# Patient Record
Sex: MALE | Race: WHITE | Employment: OTHER | URBAN - NONMETROPOLITAN AREA
[De-identification: names, ages, dates, MRNs, and addresses within clinical notes are randomized per-mention and may not be internally consistent; named-entity substitution may affect disease eponyms.]

---

## 2018-09-24 ENCOUNTER — HOSPITAL ENCOUNTER (INPATIENT)
Age: 73
LOS: 7 days | Discharge: HOME | DRG: 440 | End: 2018-10-02
Attending: EMERGENCY MEDICINE | Admitting: NURSE PRACTITIONER
Payer: MEDICARE

## 2018-09-24 ENCOUNTER — APPOINTMENT (OUTPATIENT)
Dept: CT IMAGING | Age: 73
DRG: 440 | End: 2018-09-24
Payer: MEDICARE

## 2018-09-24 DIAGNOSIS — R11.2 NAUSEA AND VOMITING IN ADULT: Primary | ICD-10-CM

## 2018-09-24 DIAGNOSIS — M19.90 ARTHRITIS: ICD-10-CM

## 2018-09-24 DIAGNOSIS — G89.29 NECK PAIN, CHRONIC: ICD-10-CM

## 2018-09-24 DIAGNOSIS — E11.8 TYPE 2 DIABETES MELLITUS WITH COMPLICATION, WITHOUT LONG-TERM CURRENT USE OF INSULIN (HCC): ICD-10-CM

## 2018-09-24 DIAGNOSIS — M54.2 NECK PAIN, CHRONIC: ICD-10-CM

## 2018-09-24 DIAGNOSIS — K85.00 IDIOPATHIC ACUTE PANCREATITIS WITHOUT INFECTION OR NECROSIS: ICD-10-CM

## 2018-09-24 LAB
ALBUMIN SERPL-MCNC: 4.5 GM/DL (ref 3.4–5)
ALP BLD-CCNC: 57 IU/L (ref 40–129)
ALT SERPL-CCNC: 25 U/L (ref 10–40)
ANION GAP SERPL CALCULATED.3IONS-SCNC: 13 MMOL/L (ref 4–16)
AST SERPL-CCNC: 26 IU/L (ref 15–37)
BACTERIA: NEGATIVE /HPF
BASOPHILS ABSOLUTE: 0 K/CU MM
BASOPHILS RELATIVE PERCENT: 0.1 % (ref 0–1)
BILIRUB SERPL-MCNC: 0.3 MG/DL (ref 0–1)
BILIRUBIN URINE: NEGATIVE MG/DL
BLOOD, URINE: NEGATIVE
BUN BLDV-MCNC: 14 MG/DL (ref 6–23)
CALCIUM SERPL-MCNC: 8.5 MG/DL (ref 8.3–10.6)
CAST TYPE: ABNORMAL /HPF
CHLORIDE BLD-SCNC: 94 MMOL/L (ref 99–110)
CLARITY: CLEAR
CO2: 31 MMOL/L (ref 21–32)
COLOR: YELLOW
CREAT SERPL-MCNC: 0.8 MG/DL (ref 0.9–1.3)
CRYSTAL TYPE: ABNORMAL /HPF
DIFFERENTIAL TYPE: ABNORMAL
EOSINOPHILS ABSOLUTE: 0.1 K/CU MM
EOSINOPHILS RELATIVE PERCENT: 0.5 % (ref 0–3)
EPITHELIAL CELLS, UA: ABNORMAL /HPF
GFR AFRICAN AMERICAN: >60 ML/MIN/1.73M2
GFR NON-AFRICAN AMERICAN: >60 ML/MIN/1.73M2
GLUCOSE BLD-MCNC: 225 MG/DL (ref 70–99)
GLUCOSE, URINE: 250 MG/DL
HCT VFR BLD CALC: 44.4 % (ref 42–52)
HEMOGLOBIN: 14.7 GM/DL (ref 13.5–18)
IMMATURE NEUTROPHIL %: 0.5 % (ref 0–0.43)
KETONES, URINE: NEGATIVE MG/DL
LACTATE: 1.7 MMOL/L (ref 0.4–2)
LEUKOCYTE ESTERASE, URINE: NEGATIVE
LIPASE: >600 IU/L (ref 13–60)
LYMPHOCYTES ABSOLUTE: 1 K/CU MM
LYMPHOCYTES RELATIVE PERCENT: 9.8 % (ref 24–44)
MCH RBC QN AUTO: 29.7 PG (ref 27–31)
MCHC RBC AUTO-ENTMCNC: 33.1 % (ref 32–36)
MCV RBC AUTO: 89.7 FL (ref 78–100)
MONOCYTES ABSOLUTE: 0.4 K/CU MM
MONOCYTES RELATIVE PERCENT: 4.2 % (ref 0–4)
MUCUS: NEGATIVE HPF
NITRITE URINE, QUANTITATIVE: NEGATIVE
PDW BLD-RTO: 12.1 % (ref 11.7–14.9)
PH, URINE: 6.5 (ref 5–8)
PLATELET # BLD: 251 K/CU MM (ref 140–440)
PMV BLD AUTO: 9.1 FL (ref 7.5–11.1)
POTASSIUM SERPL-SCNC: 4.1 MMOL/L (ref 3.5–5.1)
PROTEIN UA: NEGATIVE MG/DL
RBC # BLD: 4.95 M/CU MM (ref 4.6–6.2)
RBC URINE: ABNORMAL /HPF (ref 0–3)
SEGMENTED NEUTROPHILS ABSOLUTE COUNT: 8.7 K/CU MM
SEGMENTED NEUTROPHILS RELATIVE PERCENT: 84.9 % (ref 36–66)
SODIUM BLD-SCNC: 138 MMOL/L (ref 135–145)
SPECIFIC GRAVITY UA: <1.005 (ref 1–1.03)
TOTAL IMMATURE NEUTOROPHIL: 0.05 K/CU MM
TOTAL PROTEIN: 7.4 GM/DL (ref 6.4–8.2)
UROBILINOGEN, URINE: 0.2 MG/DL (ref 0.2–1)
VOLUME, (UVOL): 12 ML (ref 10–12)
WBC # BLD: 10.3 K/CU MM (ref 4–10.5)
WBC UA: ABNORMAL /HPF (ref 0–2)

## 2018-09-24 PROCEDURE — 81001 URINALYSIS AUTO W/SCOPE: CPT

## 2018-09-24 PROCEDURE — 74177 CT ABD & PELVIS W/CONTRAST: CPT

## 2018-09-24 PROCEDURE — 83690 ASSAY OF LIPASE: CPT

## 2018-09-24 PROCEDURE — 80053 COMPREHEN METABOLIC PANEL: CPT

## 2018-09-24 PROCEDURE — 6360000004 HC RX CONTRAST MEDICATION: Performed by: EMERGENCY MEDICINE

## 2018-09-24 PROCEDURE — 6360000002 HC RX W HCPCS: Performed by: PHYSICIAN ASSISTANT

## 2018-09-24 PROCEDURE — 99285 EMERGENCY DEPT VISIT HI MDM: CPT

## 2018-09-24 PROCEDURE — 36415 COLL VENOUS BLD VENIPUNCTURE: CPT

## 2018-09-24 PROCEDURE — 83605 ASSAY OF LACTIC ACID: CPT

## 2018-09-24 PROCEDURE — 85025 COMPLETE CBC W/AUTO DIFF WBC: CPT

## 2018-09-24 PROCEDURE — 96374 THER/PROPH/DIAG INJ IV PUSH: CPT

## 2018-09-24 RX ORDER — ONDANSETRON 2 MG/ML
4 INJECTION INTRAMUSCULAR; INTRAVENOUS EVERY 30 MIN PRN
Status: DISCONTINUED | OUTPATIENT
Start: 2018-09-24 | End: 2018-09-25

## 2018-09-24 RX ORDER — MORPHINE SULFATE 4 MG/ML
4 INJECTION, SOLUTION INTRAMUSCULAR; INTRAVENOUS EVERY 30 MIN PRN
Status: DISCONTINUED | OUTPATIENT
Start: 2018-09-24 | End: 2018-09-25

## 2018-09-24 RX ADMIN — MORPHINE SULFATE 4 MG: 4 INJECTION, SOLUTION INTRAMUSCULAR; INTRAVENOUS at 22:06

## 2018-09-24 RX ADMIN — ONDANSETRON 4 MG: 2 INJECTION, SOLUTION INTRAMUSCULAR; INTRAVENOUS at 22:06

## 2018-09-24 RX ADMIN — IOPAMIDOL 75 ML: 755 INJECTION, SOLUTION INTRAVENOUS at 23:11

## 2018-09-24 ASSESSMENT — PAIN SCALES - GENERAL
PAINLEVEL_OUTOF10: 5
PAINLEVEL_OUTOF10: 4
PAINLEVEL_OUTOF10: 6

## 2018-09-24 ASSESSMENT — ENCOUNTER SYMPTOMS
HEMATEMESIS: 0
DIARRHEA: 0
CONSTIPATION: 0
RESPIRATORY NEGATIVE: 1
VOMITING: 1
ABDOMINAL PAIN: 1
SHORTNESS OF BREATH: 0
HEMATOCHEZIA: 0
COUGH: 0
FLATUS: 0
NAUSEA: 1
EYES NEGATIVE: 1

## 2018-09-24 ASSESSMENT — PAIN DESCRIPTION - DESCRIPTORS
DESCRIPTORS: SHARP
DESCRIPTORS: SHARP

## 2018-09-24 ASSESSMENT — PAIN DESCRIPTION - LOCATION
LOCATION: ABDOMEN
LOCATION: ABDOMEN

## 2018-09-24 ASSESSMENT — PAIN DESCRIPTION - PROGRESSION: CLINICAL_PROGRESSION: NOT CHANGED

## 2018-09-24 ASSESSMENT — PAIN DESCRIPTION - FREQUENCY
FREQUENCY: CONTINUOUS
FREQUENCY: CONTINUOUS

## 2018-09-24 ASSESSMENT — PAIN DESCRIPTION - PAIN TYPE
TYPE: ACUTE PAIN
TYPE: ACUTE PAIN

## 2018-09-24 ASSESSMENT — PAIN DESCRIPTION - ORIENTATION: ORIENTATION: MID

## 2018-09-25 ENCOUNTER — APPOINTMENT (OUTPATIENT)
Dept: ULTRASOUND IMAGING | Age: 73
DRG: 440 | End: 2018-09-25
Payer: MEDICARE

## 2018-09-25 PROBLEM — K85.90 ACUTE ON CHRONIC PANCREATITIS (HCC): Status: ACTIVE | Noted: 2018-09-25

## 2018-09-25 PROBLEM — K80.20 CHOLELITHIASIS: Status: ACTIVE | Noted: 2018-09-25

## 2018-09-25 PROBLEM — K86.1 ACUTE ON CHRONIC PANCREATITIS (HCC): Status: ACTIVE | Noted: 2018-09-25

## 2018-09-25 LAB
CHOLESTEROL: 123 MG/DL
ESTIMATED AVERAGE GLUCOSE: 177 MG/DL
GLUCOSE BLD-MCNC: 114 MG/DL (ref 70–99)
GLUCOSE BLD-MCNC: 122 MG/DL (ref 70–99)
GLUCOSE BLD-MCNC: 130 MG/DL (ref 70–99)
GLUCOSE BLD-MCNC: 131 MG/DL (ref 70–99)
GLUCOSE BLD-MCNC: 170 MG/DL (ref 70–99)
HBA1C MFR BLD: 7.8 % (ref 4.2–6.3)
HDLC SERPL-MCNC: 39 MG/DL
HIGH SENSITIVE C-REACTIVE PROTEIN: 8.7 MG/L
LACTATE DEHYDROGENASE: 189 IU/L (ref 120–246)
LDL CHOLESTEROL DIRECT: 84 MG/DL
LIPASE: >600 IU/L (ref 13–60)
TOTAL CK: 103 IU/L (ref 38–174)
TRIGL SERPL-MCNC: 64 MG/DL

## 2018-09-25 PROCEDURE — 83036 HEMOGLOBIN GLYCOSYLATED A1C: CPT

## 2018-09-25 PROCEDURE — 80061 LIPID PANEL: CPT

## 2018-09-25 PROCEDURE — 83721 ASSAY OF BLOOD LIPOPROTEIN: CPT

## 2018-09-25 PROCEDURE — 82550 ASSAY OF CK (CPK): CPT

## 2018-09-25 PROCEDURE — 83615 LACTATE (LD) (LDH) ENZYME: CPT

## 2018-09-25 PROCEDURE — 82962 GLUCOSE BLOOD TEST: CPT

## 2018-09-25 PROCEDURE — 2580000003 HC RX 258: Performed by: FAMILY MEDICINE

## 2018-09-25 PROCEDURE — 83690 ASSAY OF LIPASE: CPT

## 2018-09-25 PROCEDURE — 6370000000 HC RX 637 (ALT 250 FOR IP): Performed by: NURSE PRACTITIONER

## 2018-09-25 PROCEDURE — 36415 COLL VENOUS BLD VENIPUNCTURE: CPT

## 2018-09-25 PROCEDURE — 86141 C-REACTIVE PROTEIN HS: CPT

## 2018-09-25 PROCEDURE — 2580000003 HC RX 258: Performed by: NURSE PRACTITIONER

## 2018-09-25 PROCEDURE — 6360000002 HC RX W HCPCS: Performed by: NURSE PRACTITIONER

## 2018-09-25 PROCEDURE — 1200000000 HC SEMI PRIVATE

## 2018-09-25 PROCEDURE — 76705 ECHO EXAM OF ABDOMEN: CPT

## 2018-09-25 RX ORDER — ASPIRIN 325 MG
325 TABLET ORAL DAILY
Status: DISCONTINUED | OUTPATIENT
Start: 2018-09-25 | End: 2018-10-02 | Stop reason: HOSPADM

## 2018-09-25 RX ORDER — LEVOTHYROXINE SODIUM 0.15 MG/1
150 TABLET ORAL DAILY
Status: DISCONTINUED | OUTPATIENT
Start: 2018-09-25 | End: 2018-10-02 | Stop reason: HOSPADM

## 2018-09-25 RX ORDER — ACETAMINOPHEN 325 MG/1
650 TABLET ORAL EVERY 6 HOURS PRN
Status: DISCONTINUED | OUTPATIENT
Start: 2018-09-25 | End: 2018-10-02 | Stop reason: SDUPTHER

## 2018-09-25 RX ORDER — SODIUM CHLORIDE 0.9 % (FLUSH) 0.9 %
10 SYRINGE (ML) INJECTION EVERY 12 HOURS SCHEDULED
Status: DISCONTINUED | OUTPATIENT
Start: 2018-09-25 | End: 2018-10-02 | Stop reason: SDUPTHER

## 2018-09-25 RX ORDER — GLIPIZIDE 10 MG/1
10 TABLET ORAL
Status: DISCONTINUED | OUTPATIENT
Start: 2018-09-25 | End: 2018-09-25

## 2018-09-25 RX ORDER — SIMVASTATIN 40 MG
40 TABLET ORAL NIGHTLY
Status: DISCONTINUED | OUTPATIENT
Start: 2018-09-25 | End: 2018-10-02 | Stop reason: HOSPADM

## 2018-09-25 RX ORDER — DEXTROSE MONOHYDRATE 25 G/50ML
12.5 INJECTION, SOLUTION INTRAVENOUS PRN
Status: DISCONTINUED | OUTPATIENT
Start: 2018-09-25 | End: 2018-10-02 | Stop reason: HOSPADM

## 2018-09-25 RX ORDER — LANOLIN ALCOHOL/MO/W.PET/CERES
3 CREAM (GRAM) TOPICAL NIGHTLY
Status: DISCONTINUED | OUTPATIENT
Start: 2018-09-25 | End: 2018-10-02 | Stop reason: HOSPADM

## 2018-09-25 RX ORDER — NICOTINE POLACRILEX 4 MG
15 LOZENGE BUCCAL PRN
Status: DISCONTINUED | OUTPATIENT
Start: 2018-09-25 | End: 2018-10-02 | Stop reason: HOSPADM

## 2018-09-25 RX ORDER — CALCIUM CARBONATE 200(500)MG
500 TABLET,CHEWABLE ORAL DAILY
Status: DISCONTINUED | OUTPATIENT
Start: 2018-09-25 | End: 2018-10-02 | Stop reason: HOSPADM

## 2018-09-25 RX ORDER — OMEGA-3-ACID ETHYL ESTERS 1 G/1
1 CAPSULE, LIQUID FILLED ORAL DAILY
Status: DISCONTINUED | OUTPATIENT
Start: 2018-09-25 | End: 2018-10-02 | Stop reason: HOSPADM

## 2018-09-25 RX ORDER — GEMFIBROZIL 600 MG/1
600 TABLET, FILM COATED ORAL
Status: DISCONTINUED | OUTPATIENT
Start: 2018-09-25 | End: 2018-10-02 | Stop reason: HOSPADM

## 2018-09-25 RX ORDER — TRAZODONE HYDROCHLORIDE 50 MG/1
100 TABLET ORAL NIGHTLY
Status: DISCONTINUED | OUTPATIENT
Start: 2018-09-25 | End: 2018-10-02 | Stop reason: HOSPADM

## 2018-09-25 RX ORDER — FAMOTIDINE 20 MG/1
20 TABLET, FILM COATED ORAL 2 TIMES DAILY
Status: DISCONTINUED | OUTPATIENT
Start: 2018-09-25 | End: 2018-10-02 | Stop reason: HOSPADM

## 2018-09-25 RX ORDER — SODIUM CHLORIDE 9 MG/ML
INJECTION, SOLUTION INTRAVENOUS CONTINUOUS
Status: DISCONTINUED | OUTPATIENT
Start: 2018-09-25 | End: 2018-09-29

## 2018-09-25 RX ORDER — DEXTROSE MONOHYDRATE 50 MG/ML
100 INJECTION, SOLUTION INTRAVENOUS PRN
Status: DISCONTINUED | OUTPATIENT
Start: 2018-09-25 | End: 2018-10-02 | Stop reason: HOSPADM

## 2018-09-25 RX ORDER — ACETAMINOPHEN 325 MG/1
650 TABLET ORAL EVERY 4 HOURS PRN
Status: DISCONTINUED | OUTPATIENT
Start: 2018-09-25 | End: 2018-10-02

## 2018-09-25 RX ORDER — SODIUM CHLORIDE 0.9 % (FLUSH) 0.9 %
10 SYRINGE (ML) INJECTION PRN
Status: DISCONTINUED | OUTPATIENT
Start: 2018-09-25 | End: 2018-10-02 | Stop reason: SDUPTHER

## 2018-09-25 RX ORDER — ONDANSETRON 2 MG/ML
4 INJECTION INTRAMUSCULAR; INTRAVENOUS EVERY 6 HOURS PRN
Status: DISCONTINUED | OUTPATIENT
Start: 2018-09-25 | End: 2018-10-02 | Stop reason: HOSPADM

## 2018-09-25 RX ORDER — OMEGA-3S/DHA/EPA/FISH OIL 980-1400MG
1 CAPSULE,DELAYED RELEASE (ENTERIC COATED) ORAL DAILY
Status: DISCONTINUED | OUTPATIENT
Start: 2018-09-25 | End: 2018-09-25 | Stop reason: CLARIF

## 2018-09-25 RX ADMIN — FAMOTIDINE 20 MG: 20 TABLET ORAL at 02:00

## 2018-09-25 RX ADMIN — HYDROMORPHONE HYDROCHLORIDE 0.25 MG: 1 INJECTION, SOLUTION INTRAMUSCULAR; INTRAVENOUS; SUBCUTANEOUS at 10:05

## 2018-09-25 RX ADMIN — HYDROMORPHONE HYDROCHLORIDE 0.5 MG: 1 INJECTION, SOLUTION INTRAMUSCULAR; INTRAVENOUS; SUBCUTANEOUS at 15:18

## 2018-09-25 RX ADMIN — HYDROMORPHONE HYDROCHLORIDE 0.5 MG: 1 INJECTION, SOLUTION INTRAMUSCULAR; INTRAVENOUS; SUBCUTANEOUS at 20:58

## 2018-09-25 RX ADMIN — ENOXAPARIN SODIUM 40 MG: 40 INJECTION SUBCUTANEOUS at 09:18

## 2018-09-25 RX ADMIN — MELATONIN TAB 3 MG 3 MG: 3 TAB at 20:57

## 2018-09-25 RX ADMIN — FAMOTIDINE 20 MG: 20 TABLET ORAL at 09:18

## 2018-09-25 RX ADMIN — OMEGA-3-ACID ETHYL ESTERS 1 G: 1 CAPSULE, LIQUID FILLED ORAL at 09:18

## 2018-09-25 RX ADMIN — FAMOTIDINE 20 MG: 20 TABLET ORAL at 20:57

## 2018-09-25 RX ADMIN — ASPIRIN 325 MG: 325 TABLET ORAL at 09:18

## 2018-09-25 RX ADMIN — GEMFIBROZIL 600 MG: 600 TABLET ORAL at 09:18

## 2018-09-25 RX ADMIN — TRAZODONE HYDROCHLORIDE 100 MG: 50 TABLET ORAL at 20:57

## 2018-09-25 RX ADMIN — SODIUM CHLORIDE: 9 INJECTION, SOLUTION INTRAVENOUS at 18:37

## 2018-09-25 RX ADMIN — SODIUM CHLORIDE: 9 INJECTION, SOLUTION INTRAVENOUS at 01:55

## 2018-09-25 RX ADMIN — GEMFIBROZIL 600 MG: 600 TABLET ORAL at 15:39

## 2018-09-25 RX ADMIN — MELATONIN TAB 3 MG 3 MG: 3 TAB at 02:00

## 2018-09-25 RX ADMIN — HYDROMORPHONE HYDROCHLORIDE 0.25 MG: 1 INJECTION, SOLUTION INTRAMUSCULAR; INTRAVENOUS; SUBCUTANEOUS at 02:15

## 2018-09-25 RX ADMIN — HYDROMORPHONE HYDROCHLORIDE 0.5 MG: 1 INJECTION, SOLUTION INTRAMUSCULAR; INTRAVENOUS; SUBCUTANEOUS at 05:16

## 2018-09-25 RX ADMIN — LEVOTHYROXINE SODIUM 150 MCG: 150 TABLET ORAL at 09:18

## 2018-09-25 RX ADMIN — INSULIN LISPRO 1 UNITS: 100 INJECTION, SOLUTION INTRAVENOUS; SUBCUTANEOUS at 02:07

## 2018-09-25 RX ADMIN — VITAMIN D, TAB 1000IU (100/BT) 1000 UNITS: 25 TAB at 09:18

## 2018-09-25 RX ADMIN — CALCIUM CARBONATE 500 MG: 500 TABLET, CHEWABLE ORAL at 09:18

## 2018-09-25 RX ADMIN — TRAZODONE HYDROCHLORIDE 100 MG: 50 TABLET ORAL at 02:00

## 2018-09-25 RX ADMIN — SIMVASTATIN 40 MG: 40 TABLET, FILM COATED ORAL at 20:57

## 2018-09-25 ASSESSMENT — PAIN DESCRIPTION - LOCATION
LOCATION: ABDOMEN

## 2018-09-25 ASSESSMENT — PAIN DESCRIPTION - PROGRESSION
CLINICAL_PROGRESSION: NOT CHANGED

## 2018-09-25 ASSESSMENT — PAIN DESCRIPTION - DESCRIPTORS
DESCRIPTORS: SHARP

## 2018-09-25 ASSESSMENT — PAIN DESCRIPTION - FREQUENCY
FREQUENCY: INTERMITTENT
FREQUENCY: CONTINUOUS
FREQUENCY: INTERMITTENT

## 2018-09-25 ASSESSMENT — PAIN SCALES - GENERAL
PAINLEVEL_OUTOF10: 3
PAINLEVEL_OUTOF10: 7
PAINLEVEL_OUTOF10: 7
PAINLEVEL_OUTOF10: 0
PAINLEVEL_OUTOF10: 7
PAINLEVEL_OUTOF10: 3
PAINLEVEL_OUTOF10: 3
PAINLEVEL_OUTOF10: 7
PAINLEVEL_OUTOF10: 5
PAINLEVEL_OUTOF10: 6

## 2018-09-25 ASSESSMENT — PAIN DESCRIPTION - ORIENTATION
ORIENTATION: MID

## 2018-09-25 ASSESSMENT — PAIN DESCRIPTION - PAIN TYPE
TYPE: ACUTE PAIN

## 2018-09-25 NOTE — ED PROVIDER NOTES
As provider-in-triage, I performed a medical screening history and physical exam on this patient. HISTORY OF PRESENT ILLNESS  Pepe Bergman is a 68 y.o. male who presents for abdominal pain since noon today. Feels like pressure, bloating. Constant since onset. No associated symptoms. PHYSICAL EXAM  BP (!) 108/59   Pulse 63   Temp 98.6 °F (37 °C) (Oral)   Resp 16   Ht 5' 7\" (1.702 m)   Wt 193 lb (87.5 kg)   SpO2 98%   BMI 30.23 kg/m²     On exam, the patient appears well-hydrated, well-nourished, and in no acute distress. Mucous membranes are moist. Speech is clear. Breathing is unlabored. Skin is dry. Mental status is normal. Moves all extremities, and is without facial droop. Comment: Please note this report has been produced using speech recognition software and may contain errors related to that system including errors in grammar, punctuation, and spelling, as well as words and phrases that may be inappropriate. If there are any questions or concerns please feel free to contact the dictating provider for clarification.         [address] [place] [address]  09/24/18 0948
the vapor cigarette occ not on regular basis\"    Alcohol use No      Comment: \"use to drink in regular basis- quit age 42's   King Tamir Drug use: No    Sexual activity: Yes     Partners: Female     Other Topics Concern    Not on file     Social History Narrative    No narrative on file     Past Surgical History:   Procedure Laterality Date    APPENDECTOMY  2006    CARPAL TUNNEL RELEASE Right 2014    CATARACT REMOVAL Bilateral 12/01/2015    COLONOSCOPY      COLONOSCOPY  04/26/2013    EYE SURGERY Right     cataract extraction with implant/ fritz \"eyelid lift- 2015    HERNIA REPAIR Right 1990's    inguinal    ROTATOR CUFF REPAIR Left 2005     Past Medical History:   Diagnosis Date    Arthritis     BPH (benign prostatic hyperplasia)     CPAP (continuous positive airway pressure) dependence     Diabetes mellitus ([address]     dx 2012    Erectile dysfunction     Hyperlipidemia     Sleep apnea     had sleep study done 2015=- uses cpap nightly    Wears dentures     upper    Wears glasses     \"just to read\"     Allergies   Allergen Reactions    Pcn [Penicillins] Hives     Prior to Admission medications    Medication Sig Start Date End Date Taking?  Authorizing Provider   levothyroxine (SYNTHROID) 150 MCG tablet Take 150 mcg by mouth daily   Yes Historical Provider, MD   calcium carbonate (OSCAL) 500 MG TABS tablet Take 500 mg by mouth daily   Yes Historical Provider, MD   melatonin 3 MG TABS tablet Take 3 mg by mouth daily   Yes Historical Provider, MD   acetaminophen (AMINOFEN) 325 MG tablet Take 2 tablets by mouth every 6 hours as needed for Pain 10/2/17  Yes NILSON Palencia - ELLY   traZODone (DESYREL) 100 MG tablet Take 100 mg by mouth nightly   Yes Historical Provider, MD   Fish Oil-Cholecalciferol (FISH OIL + D3 PO) Take by mouth   Yes Historical Provider, MD   glipiZIDE (GLUCOTROL) 5 MG tablet Take 10 mg by mouth every morning (before breakfast)    Yes Historical Provider, MD   gemfibrozil (LOPID) 600 MG

## 2018-09-26 LAB
ANION GAP SERPL CALCULATED.3IONS-SCNC: 11 MMOL/L (ref 4–16)
BASOPHILS ABSOLUTE: 0 K/CU MM
BASOPHILS RELATIVE PERCENT: 0.1 % (ref 0–1)
BUN BLDV-MCNC: 11 MG/DL (ref 6–23)
CALCIUM SERPL-MCNC: 8 MG/DL (ref 8.3–10.6)
CHLORIDE BLD-SCNC: 101 MMOL/L (ref 99–110)
CO2: 26 MMOL/L (ref 21–32)
CREAT SERPL-MCNC: 0.8 MG/DL (ref 0.9–1.3)
DIFFERENTIAL TYPE: ABNORMAL
EOSINOPHILS ABSOLUTE: 0 K/CU MM
EOSINOPHILS RELATIVE PERCENT: 0.2 % (ref 0–3)
GFR AFRICAN AMERICAN: >60 ML/MIN/1.73M2
GFR NON-AFRICAN AMERICAN: >60 ML/MIN/1.73M2
GLUCOSE BLD-MCNC: 114 MG/DL (ref 70–99)
GLUCOSE BLD-MCNC: 114 MG/DL (ref 70–99)
GLUCOSE BLD-MCNC: 126 MG/DL (ref 70–99)
GLUCOSE BLD-MCNC: 131 MG/DL (ref 70–99)
GLUCOSE BLD-MCNC: 139 MG/DL (ref 70–99)
HCT VFR BLD CALC: 40.9 % (ref 42–52)
HEMOGLOBIN: 13.4 GM/DL (ref 13.5–18)
HIGH SENSITIVE C-REACTIVE PROTEIN: 129.4 MG/L
IMMATURE NEUTROPHIL %: 0.5 % (ref 0–0.43)
LIPASE: 174 IU/L (ref 13–60)
LYMPHOCYTES ABSOLUTE: 1 K/CU MM
LYMPHOCYTES RELATIVE PERCENT: 9.3 % (ref 24–44)
MCH RBC QN AUTO: 29.6 PG (ref 27–31)
MCHC RBC AUTO-ENTMCNC: 32.8 % (ref 32–36)
MCV RBC AUTO: 90.5 FL (ref 78–100)
MONOCYTES ABSOLUTE: 0.8 K/CU MM
MONOCYTES RELATIVE PERCENT: 7.2 % (ref 0–4)
PDW BLD-RTO: 12.3 % (ref 11.7–14.9)
PLATELET # BLD: 203 K/CU MM (ref 140–440)
PMV BLD AUTO: 10.2 FL (ref 7.5–11.1)
POTASSIUM SERPL-SCNC: 4 MMOL/L (ref 3.5–5.1)
RBC # BLD: 4.52 M/CU MM (ref 4.6–6.2)
SEGMENTED NEUTROPHILS ABSOLUTE COUNT: 9.2 K/CU MM
SEGMENTED NEUTROPHILS RELATIVE PERCENT: 82.7 % (ref 36–66)
SODIUM BLD-SCNC: 138 MMOL/L (ref 135–145)
TOTAL IMMATURE NEUTOROPHIL: 0.06 K/CU MM
WBC # BLD: 11.1 K/CU MM (ref 4–10.5)

## 2018-09-26 PROCEDURE — 90686 IIV4 VACC NO PRSV 0.5 ML IM: CPT | Performed by: NURSE PRACTITIONER

## 2018-09-26 PROCEDURE — 86141 C-REACTIVE PROTEIN HS: CPT

## 2018-09-26 PROCEDURE — 6370000000 HC RX 637 (ALT 250 FOR IP): Performed by: NURSE PRACTITIONER

## 2018-09-26 PROCEDURE — 80048 BASIC METABOLIC PNL TOTAL CA: CPT

## 2018-09-26 PROCEDURE — 82962 GLUCOSE BLOOD TEST: CPT

## 2018-09-26 PROCEDURE — 2580000003 HC RX 258: Performed by: NURSE PRACTITIONER

## 2018-09-26 PROCEDURE — 83690 ASSAY OF LIPASE: CPT

## 2018-09-26 PROCEDURE — 6360000002 HC RX W HCPCS: Performed by: NURSE PRACTITIONER

## 2018-09-26 PROCEDURE — 85025 COMPLETE CBC W/AUTO DIFF WBC: CPT

## 2018-09-26 PROCEDURE — 36415 COLL VENOUS BLD VENIPUNCTURE: CPT

## 2018-09-26 PROCEDURE — G0008 ADMIN INFLUENZA VIRUS VAC: HCPCS | Performed by: NURSE PRACTITIONER

## 2018-09-26 PROCEDURE — 1200000000 HC SEMI PRIVATE

## 2018-09-26 PROCEDURE — 2580000003 HC RX 258: Performed by: FAMILY MEDICINE

## 2018-09-26 RX ADMIN — INFLUENZA A VIRUS A/MICHIGAN/45/2015 X-275 (H1N1) ANTIGEN (FORMALDEHYDE INACTIVATED), INFLUENZA A VIRUS A/SINGAPORE/INFIMH-16-0019/2016 IVR-186 (H3N2) ANTIGEN (FORMALDEHYDE INACTIVATED), INFLUENZA B VIRUS B/PHUKET/3073/2013 ANTIGEN (FORMALDEHYDE INACTIVATED), AND INFLUENZA B VIRUS B/MARYLAND/15/2016 BX-69A ANTIGEN (FORMALDEHYDE INACTIVATED) 0.5 ML: 15; 15; 15; 15 INJECTION, SUSPENSION INTRAMUSCULAR at 10:14

## 2018-09-26 RX ADMIN — HYDROMORPHONE HYDROCHLORIDE 0.5 MG: 1 INJECTION, SOLUTION INTRAMUSCULAR; INTRAVENOUS; SUBCUTANEOUS at 23:53

## 2018-09-26 RX ADMIN — ACETAMINOPHEN 650 MG: 325 TABLET, FILM COATED ORAL at 20:00

## 2018-09-26 RX ADMIN — ENOXAPARIN SODIUM 40 MG: 40 INJECTION SUBCUTANEOUS at 10:14

## 2018-09-26 RX ADMIN — VITAMIN D, TAB 1000IU (100/BT) 1000 UNITS: 25 TAB at 10:13

## 2018-09-26 RX ADMIN — MELATONIN TAB 3 MG 3 MG: 3 TAB at 21:35

## 2018-09-26 RX ADMIN — HYDROMORPHONE HYDROCHLORIDE 0.5 MG: 1 INJECTION, SOLUTION INTRAMUSCULAR; INTRAVENOUS; SUBCUTANEOUS at 13:16

## 2018-09-26 RX ADMIN — HYDROMORPHONE HYDROCHLORIDE 0.5 MG: 1 INJECTION, SOLUTION INTRAMUSCULAR; INTRAVENOUS; SUBCUTANEOUS at 08:00

## 2018-09-26 RX ADMIN — GEMFIBROZIL 600 MG: 600 TABLET ORAL at 17:07

## 2018-09-26 RX ADMIN — FAMOTIDINE 20 MG: 20 TABLET ORAL at 21:35

## 2018-09-26 RX ADMIN — SODIUM CHLORIDE: 9 INJECTION, SOLUTION INTRAVENOUS at 19:20

## 2018-09-26 RX ADMIN — SIMVASTATIN 40 MG: 40 TABLET, FILM COATED ORAL at 21:35

## 2018-09-26 RX ADMIN — GEMFIBROZIL 600 MG: 600 TABLET ORAL at 08:00

## 2018-09-26 RX ADMIN — SODIUM CHLORIDE, PRESERVATIVE FREE 10 ML: 5 INJECTION INTRAVENOUS at 08:00

## 2018-09-26 RX ADMIN — OMEGA-3-ACID ETHYL ESTERS 1 G: 1 CAPSULE, LIQUID FILLED ORAL at 10:13

## 2018-09-26 RX ADMIN — FAMOTIDINE 20 MG: 20 TABLET ORAL at 10:13

## 2018-09-26 RX ADMIN — HYDROMORPHONE HYDROCHLORIDE 0.5 MG: 1 INJECTION, SOLUTION INTRAMUSCULAR; INTRAVENOUS; SUBCUTANEOUS at 16:22

## 2018-09-26 RX ADMIN — LEVOTHYROXINE SODIUM 150 MCG: 150 TABLET ORAL at 08:00

## 2018-09-26 RX ADMIN — SODIUM CHLORIDE, PRESERVATIVE FREE 10 ML: 5 INJECTION INTRAVENOUS at 16:22

## 2018-09-26 RX ADMIN — SODIUM CHLORIDE: 9 INJECTION, SOLUTION INTRAVENOUS at 11:12

## 2018-09-26 RX ADMIN — ASPIRIN 325 MG: 325 TABLET ORAL at 10:13

## 2018-09-26 RX ADMIN — HYDROMORPHONE HYDROCHLORIDE 0.5 MG: 1 INJECTION, SOLUTION INTRAMUSCULAR; INTRAVENOUS; SUBCUTANEOUS at 02:31

## 2018-09-26 RX ADMIN — CALCIUM CARBONATE 500 MG: 500 TABLET, CHEWABLE ORAL at 10:13

## 2018-09-26 RX ADMIN — TRAZODONE HYDROCHLORIDE 100 MG: 50 TABLET ORAL at 21:35

## 2018-09-26 RX ADMIN — SODIUM CHLORIDE: 9 INJECTION, SOLUTION INTRAVENOUS at 03:23

## 2018-09-26 RX ADMIN — HYDROMORPHONE HYDROCHLORIDE 0.5 MG: 1 INJECTION, SOLUTION INTRAMUSCULAR; INTRAVENOUS; SUBCUTANEOUS at 19:20

## 2018-09-26 ASSESSMENT — PAIN DESCRIPTION - PROGRESSION
CLINICAL_PROGRESSION: GRADUALLY IMPROVING
CLINICAL_PROGRESSION: RAPIDLY WORSENING
CLINICAL_PROGRESSION: GRADUALLY IMPROVING
CLINICAL_PROGRESSION: NOT CHANGED
CLINICAL_PROGRESSION: NOT CHANGED
CLINICAL_PROGRESSION: GRADUALLY IMPROVING
CLINICAL_PROGRESSION: GRADUALLY IMPROVING
CLINICAL_PROGRESSION: GRADUALLY WORSENING
CLINICAL_PROGRESSION: RAPIDLY WORSENING
CLINICAL_PROGRESSION: GRADUALLY IMPROVING
CLINICAL_PROGRESSION: GRADUALLY IMPROVING
CLINICAL_PROGRESSION: NOT CHANGED

## 2018-09-26 ASSESSMENT — PAIN SCALES - GENERAL
PAINLEVEL_OUTOF10: 4
PAINLEVEL_OUTOF10: 4
PAINLEVEL_OUTOF10: 5
PAINLEVEL_OUTOF10: 4
PAINLEVEL_OUTOF10: 7
PAINLEVEL_OUTOF10: 7
PAINLEVEL_OUTOF10: 5
PAINLEVEL_OUTOF10: 4
PAINLEVEL_OUTOF10: 6
PAINLEVEL_OUTOF10: 4
PAINLEVEL_OUTOF10: 0
PAINLEVEL_OUTOF10: 7
PAINLEVEL_OUTOF10: 6
PAINLEVEL_OUTOF10: 7
PAINLEVEL_OUTOF10: 4
PAINLEVEL_OUTOF10: 7
PAINLEVEL_OUTOF10: 7

## 2018-09-26 ASSESSMENT — PAIN DESCRIPTION - ORIENTATION
ORIENTATION: MID
ORIENTATION: UPPER;LEFT
ORIENTATION: LEFT;UPPER
ORIENTATION: LEFT;UPPER
ORIENTATION: LEFT;RIGHT;UPPER;LOWER
ORIENTATION: LEFT;UPPER
ORIENTATION: RIGHT;LEFT;LOWER;UPPER
ORIENTATION: RIGHT;LEFT;LOWER;UPPER
ORIENTATION: LEFT;UPPER
ORIENTATION: LEFT;UPPER
ORIENTATION: RIGHT;LEFT;UPPER;LOWER
ORIENTATION: UPPER;LEFT
ORIENTATION: LEFT;UPPER

## 2018-09-26 ASSESSMENT — PAIN DESCRIPTION - LOCATION
LOCATION: ABDOMEN

## 2018-09-26 ASSESSMENT — PAIN DESCRIPTION - FREQUENCY
FREQUENCY: INTERMITTENT

## 2018-09-26 ASSESSMENT — PAIN DESCRIPTION - ONSET
ONSET: ON-GOING
ONSET: ON-GOING
ONSET: SUDDEN
ONSET: ON-GOING
ONSET: ON-GOING
ONSET: AWAKENED FROM SLEEP
ONSET: ON-GOING
ONSET: AWAKENED FROM SLEEP
ONSET: ON-GOING

## 2018-09-26 ASSESSMENT — PAIN DESCRIPTION - DESCRIPTORS
DESCRIPTORS: SHARP

## 2018-09-26 ASSESSMENT — PAIN DESCRIPTION - PAIN TYPE
TYPE: ACUTE PAIN

## 2018-09-26 NOTE — PROGRESS NOTES
°F (37.2 °C)  Min: 98.9 °F (37.2 °C)  Max: 98.9 °F (37.2 °C)  RESPIRATIONS RANGE: Resp  Av  Min: 16  Max: 16  PULSE RANGE: Pulse  Av.5  Min: 73  Max: 74  BLOOD PRESSURE RANGE:  Systolic (45DSC), YMS:366 , Min:144 , JGR:467   ; Diastolic (27TWN), RYM:97, Min:76, Max:85    PULSE OXIMETRY RANGE: SpO2  Av.5 %  Min: 95 %  Max: 96 %  24HR INTAKE/OUTPUT:    Intake/Output Summary (Last 24 hours) at 18 1300  Last data filed at 18 0800   Gross per 24 hour   Intake             1707 ml   Output                0 ml   Net             1707 ml       OBJECTIVE:    General appearance: Conscious, coherent, cooperative in no acute distress  Head: atraumatic & normocephalic  Oral cavity: moist mucous membranes with normal dentition  Neck: supple with no lymphadenopathy, JVD down, trachea central  CVS: normal S1S2 with no additional heart sounds  Respi: good air entry in both lung fields with no other adventious breath sounds  Abdo: Soft, tender in the epigastric region, no guarding or rigidity and no rebound tenderness on the bowel sounds are present  CNS: no focal weakness or sensory deficits peripherally, DTR's equal bilaterally, CN2-12 normal  Skin: no rash, purpurea or eruptions  MS: no muscle tenderness, normal ROM in all major Joints      Data    Recent Labs      18   0520   WBC  10.3  11.1*   HGB  14.7  13.4*   HCT  44.4  40.9*   PLT  251  203      Recent Labs      18   0520   NA  138  138   K  4.1  4.0   CL  94*  101   CO2  31  26   BUN  14  11   CREATININE  0.8*  0.8*     Recent Labs      18   AST  26   ALT  25   BILITOT  0.3   ALKPHOS  57         Electronically signed by Brady Phelps MD on 2018 at 1:00 PM      Comment: Please note this report has been produced using speech recognition software and may contain errors related to that system including errors in grammar, punctuation, and spelling, as well as words and phrases that

## 2018-09-27 ENCOUNTER — APPOINTMENT (OUTPATIENT)
Dept: CT IMAGING | Age: 73
DRG: 440 | End: 2018-09-27
Payer: MEDICARE

## 2018-09-27 LAB
ALBUMIN SERPL-MCNC: 3.7 GM/DL (ref 3.4–5)
ALP BLD-CCNC: 48 IU/L (ref 40–129)
ALT SERPL-CCNC: 15 U/L (ref 10–40)
ANION GAP SERPL CALCULATED.3IONS-SCNC: 19 MMOL/L (ref 4–16)
AST SERPL-CCNC: 27 IU/L (ref 15–37)
BILIRUB SERPL-MCNC: 0.9 MG/DL (ref 0–1)
BUN BLDV-MCNC: 12 MG/DL (ref 6–23)
CALCIUM SERPL-MCNC: 7.4 MG/DL (ref 8.3–10.6)
CHLORIDE BLD-SCNC: 99 MMOL/L (ref 99–110)
CO2: 18 MMOL/L (ref 21–32)
CREAT SERPL-MCNC: 0.7 MG/DL (ref 0.9–1.3)
ERYTHROCYTE SEDIMENTATION RATE: 43 MM/HR (ref 0–20)
GFR AFRICAN AMERICAN: >60 ML/MIN/1.73M2
GFR NON-AFRICAN AMERICAN: >60 ML/MIN/1.73M2
GLUCOSE BLD-MCNC: 108 MG/DL (ref 70–99)
GLUCOSE BLD-MCNC: 109 MG/DL (ref 70–99)
GLUCOSE BLD-MCNC: 113 MG/DL (ref 70–99)
GLUCOSE BLD-MCNC: 115 MG/DL (ref 70–99)
GLUCOSE BLD-MCNC: 129 MG/DL (ref 70–99)
GLUCOSE BLD-MCNC: 93 MG/DL (ref 70–99)
HCT VFR BLD CALC: 38 % (ref 42–52)
HEMOGLOBIN: 12.5 GM/DL (ref 13.5–18)
HIGH SENSITIVE C-REACTIVE PROTEIN: 218.9 MG/L
LIPASE: 31 IU/L (ref 13–60)
MCH RBC QN AUTO: 29.4 PG (ref 27–31)
MCHC RBC AUTO-ENTMCNC: 32.9 % (ref 32–36)
MCV RBC AUTO: 89.4 FL (ref 78–100)
PDW BLD-RTO: 12.1 % (ref 11.7–14.9)
PLATELET # BLD: 188 K/CU MM (ref 140–440)
PMV BLD AUTO: 9.4 FL (ref 7.5–11.1)
POTASSIUM SERPL-SCNC: 3.9 MMOL/L (ref 3.5–5.1)
RBC # BLD: 4.25 M/CU MM (ref 4.6–6.2)
SODIUM BLD-SCNC: 136 MMOL/L (ref 135–145)
TOTAL PROTEIN: 5.8 GM/DL (ref 6.4–8.2)
WBC # BLD: 11.1 K/CU MM (ref 4–10.5)

## 2018-09-27 PROCEDURE — 80053 COMPREHEN METABOLIC PANEL: CPT

## 2018-09-27 PROCEDURE — 86141 C-REACTIVE PROTEIN HS: CPT

## 2018-09-27 PROCEDURE — 1200000000 HC SEMI PRIVATE

## 2018-09-27 PROCEDURE — 6360000002 HC RX W HCPCS: Performed by: FAMILY MEDICINE

## 2018-09-27 PROCEDURE — 85652 RBC SED RATE AUTOMATED: CPT

## 2018-09-27 PROCEDURE — 6370000000 HC RX 637 (ALT 250 FOR IP): Performed by: NURSE PRACTITIONER

## 2018-09-27 PROCEDURE — 36415 COLL VENOUS BLD VENIPUNCTURE: CPT

## 2018-09-27 PROCEDURE — 2580000003 HC RX 258: Performed by: FAMILY MEDICINE

## 2018-09-27 PROCEDURE — 2580000003 HC RX 258: Performed by: NURSE PRACTITIONER

## 2018-09-27 PROCEDURE — 6360000004 HC RX CONTRAST MEDICATION: Performed by: FAMILY MEDICINE

## 2018-09-27 PROCEDURE — 6360000002 HC RX W HCPCS: Performed by: NURSE PRACTITIONER

## 2018-09-27 PROCEDURE — 82962 GLUCOSE BLOOD TEST: CPT

## 2018-09-27 PROCEDURE — 83690 ASSAY OF LIPASE: CPT

## 2018-09-27 PROCEDURE — 74177 CT ABD & PELVIS W/CONTRAST: CPT

## 2018-09-27 PROCEDURE — 85027 COMPLETE CBC AUTOMATED: CPT

## 2018-09-27 RX ADMIN — HYDROMORPHONE HYDROCHLORIDE 1 MG: 1 INJECTION, SOLUTION INTRAMUSCULAR; INTRAVENOUS; SUBCUTANEOUS at 13:34

## 2018-09-27 RX ADMIN — GEMFIBROZIL 600 MG: 600 TABLET ORAL at 06:11

## 2018-09-27 RX ADMIN — HYDROMORPHONE HYDROCHLORIDE 0.5 MG: 1 INJECTION, SOLUTION INTRAMUSCULAR; INTRAVENOUS; SUBCUTANEOUS at 03:06

## 2018-09-27 RX ADMIN — SODIUM CHLORIDE: 9 INJECTION, SOLUTION INTRAVENOUS at 11:13

## 2018-09-27 RX ADMIN — HYDROMORPHONE HYDROCHLORIDE 0.5 MG: 1 INJECTION, SOLUTION INTRAMUSCULAR; INTRAVENOUS; SUBCUTANEOUS at 06:06

## 2018-09-27 RX ADMIN — HYDROMORPHONE HYDROCHLORIDE 1 MG: 1 INJECTION, SOLUTION INTRAMUSCULAR; INTRAVENOUS; SUBCUTANEOUS at 23:05

## 2018-09-27 RX ADMIN — SIMVASTATIN 40 MG: 40 TABLET, FILM COATED ORAL at 20:02

## 2018-09-27 RX ADMIN — OMEGA-3-ACID ETHYL ESTERS 1 G: 1 CAPSULE, LIQUID FILLED ORAL at 08:58

## 2018-09-27 RX ADMIN — SODIUM CHLORIDE: 9 INJECTION, SOLUTION INTRAVENOUS at 23:04

## 2018-09-27 RX ADMIN — ACETAMINOPHEN 650 MG: 325 TABLET, FILM COATED ORAL at 08:08

## 2018-09-27 RX ADMIN — SODIUM CHLORIDE: 9 INJECTION, SOLUTION INTRAVENOUS at 03:06

## 2018-09-27 RX ADMIN — FAMOTIDINE 20 MG: 20 TABLET ORAL at 08:58

## 2018-09-27 RX ADMIN — SODIUM CHLORIDE, PRESERVATIVE FREE 10 ML: 5 INJECTION INTRAVENOUS at 09:57

## 2018-09-27 RX ADMIN — MELATONIN TAB 3 MG 3 MG: 3 TAB at 20:01

## 2018-09-27 RX ADMIN — ASPIRIN 325 MG: 325 TABLET ORAL at 08:58

## 2018-09-27 RX ADMIN — GEMFIBROZIL 600 MG: 600 TABLET ORAL at 17:12

## 2018-09-27 RX ADMIN — FAMOTIDINE 20 MG: 20 TABLET ORAL at 20:01

## 2018-09-27 RX ADMIN — ENOXAPARIN SODIUM 40 MG: 40 INJECTION SUBCUTANEOUS at 08:58

## 2018-09-27 RX ADMIN — ACETAMINOPHEN 650 MG: 325 TABLET, FILM COATED ORAL at 02:01

## 2018-09-27 RX ADMIN — TRAZODONE HYDROCHLORIDE 100 MG: 50 TABLET ORAL at 20:01

## 2018-09-27 RX ADMIN — HYDROMORPHONE HYDROCHLORIDE 1 MG: 1 INJECTION, SOLUTION INTRAMUSCULAR; INTRAVENOUS; SUBCUTANEOUS at 16:45

## 2018-09-27 RX ADMIN — CALCIUM CARBONATE 500 MG: 500 TABLET, CHEWABLE ORAL at 08:58

## 2018-09-27 RX ADMIN — HYDROMORPHONE HYDROCHLORIDE 1 MG: 1 INJECTION, SOLUTION INTRAMUSCULAR; INTRAVENOUS; SUBCUTANEOUS at 19:56

## 2018-09-27 RX ADMIN — HYDROMORPHONE HYDROCHLORIDE 1 MG: 1 INJECTION, SOLUTION INTRAMUSCULAR; INTRAVENOUS; SUBCUTANEOUS at 09:57

## 2018-09-27 RX ADMIN — IOPAMIDOL 75 ML: 755 INJECTION, SOLUTION INTRAVENOUS at 09:13

## 2018-09-27 RX ADMIN — VITAMIN D, TAB 1000IU (100/BT) 1000 UNITS: 25 TAB at 08:58

## 2018-09-27 RX ADMIN — LEVOTHYROXINE SODIUM 150 MCG: 150 TABLET ORAL at 06:11

## 2018-09-27 ASSESSMENT — PAIN SCALES - GENERAL
PAINLEVEL_OUTOF10: 8
PAINLEVEL_OUTOF10: 5
PAINLEVEL_OUTOF10: 6
PAINLEVEL_OUTOF10: 8
PAINLEVEL_OUTOF10: 6
PAINLEVEL_OUTOF10: 7
PAINLEVEL_OUTOF10: 5
PAINLEVEL_OUTOF10: 4
PAINLEVEL_OUTOF10: 8
PAINLEVEL_OUTOF10: 4
PAINLEVEL_OUTOF10: 7
PAINLEVEL_OUTOF10: 5
PAINLEVEL_OUTOF10: 8
PAINLEVEL_OUTOF10: 7
PAINLEVEL_OUTOF10: 5
PAINLEVEL_OUTOF10: 7
PAINLEVEL_OUTOF10: 5
PAINLEVEL_OUTOF10: 6
PAINLEVEL_OUTOF10: 7
PAINLEVEL_OUTOF10: 5
PAINLEVEL_OUTOF10: 5

## 2018-09-27 ASSESSMENT — PAIN DESCRIPTION - PAIN TYPE
TYPE: ACUTE PAIN

## 2018-09-27 ASSESSMENT — PAIN DESCRIPTION - FREQUENCY
FREQUENCY: CONTINUOUS
FREQUENCY: INTERMITTENT
FREQUENCY: CONTINUOUS
FREQUENCY: INTERMITTENT
FREQUENCY: CONTINUOUS
FREQUENCY: INTERMITTENT
FREQUENCY: CONTINUOUS
FREQUENCY: CONTINUOUS
FREQUENCY: INTERMITTENT
FREQUENCY: INTERMITTENT
FREQUENCY: CONTINUOUS
FREQUENCY: CONTINUOUS
FREQUENCY: INTERMITTENT
FREQUENCY: CONTINUOUS
FREQUENCY: INTERMITTENT

## 2018-09-27 ASSESSMENT — PAIN DESCRIPTION - LOCATION
LOCATION: ABDOMEN;BACK;RIB CAGE
LOCATION: ABDOMEN;BACK
LOCATION: ABDOMEN;BACK
LOCATION: ABDOMEN;BACK;RIB CAGE
LOCATION: ABDOMEN;BACK
LOCATION: ABDOMEN
LOCATION: ABDOMEN;BACK
LOCATION: ABDOMEN;BACK;RIB CAGE
LOCATION: ABDOMEN;BACK
LOCATION: ABDOMEN;BACK;RIB CAGE
LOCATION: ABDOMEN
LOCATION: ABDOMEN;BACK
LOCATION: ABDOMEN;BACK;RIB CAGE
LOCATION: ABDOMEN;BACK;RIB CAGE
LOCATION: ABDOMEN;BACK
LOCATION: ABDOMEN
LOCATION: ABDOMEN;BACK;RIB CAGE

## 2018-09-27 ASSESSMENT — PAIN DESCRIPTION - ONSET
ONSET: ON-GOING
ONSET: AWAKENED FROM SLEEP
ONSET: ON-GOING

## 2018-09-27 ASSESSMENT — PAIN DESCRIPTION - DESCRIPTORS
DESCRIPTORS: ACHING;SHARP
DESCRIPTORS: ACHING;SHARP
DESCRIPTORS: ACHING;CONSTANT
DESCRIPTORS: ACHING;CONSTANT
DESCRIPTORS: SHARP
DESCRIPTORS: ACHING;SHARP
DESCRIPTORS: SHARP
DESCRIPTORS: ACHING;SHARP
DESCRIPTORS: ACHING;SHARP
DESCRIPTORS: ACHING;CONSTANT
DESCRIPTORS: ACHING;SHARP
DESCRIPTORS: SHARP
DESCRIPTORS: ACHING;SHARP

## 2018-09-27 ASSESSMENT — PAIN DESCRIPTION - ORIENTATION
ORIENTATION: LOWER
ORIENTATION: MID
ORIENTATION: LEFT;UPPER
ORIENTATION: LOWER
ORIENTATION: RIGHT;LEFT;UPPER
ORIENTATION: LOWER
ORIENTATION: UPPER
ORIENTATION: LOWER
ORIENTATION: RIGHT;LEFT;UPPER
ORIENTATION: UPPER
ORIENTATION: LOWER

## 2018-09-27 ASSESSMENT — PAIN DESCRIPTION - PROGRESSION
CLINICAL_PROGRESSION: NOT CHANGED
CLINICAL_PROGRESSION: GRADUALLY WORSENING
CLINICAL_PROGRESSION: GRADUALLY IMPROVING
CLINICAL_PROGRESSION: GRADUALLY IMPROVING
CLINICAL_PROGRESSION: GRADUALLY WORSENING
CLINICAL_PROGRESSION: GRADUALLY IMPROVING
CLINICAL_PROGRESSION: GRADUALLY IMPROVING
CLINICAL_PROGRESSION: NOT CHANGED
CLINICAL_PROGRESSION: GRADUALLY WORSENING

## 2018-09-27 NOTE — PROGRESS NOTES
PCP: Sana Florence MD  Hospital Day: 4    Chief Complaint on Admission: Abdominal pain secondary to pancreatitis    SUBJECTIVE: Continues to having significant amount amount of abdominal pain and discomfort. ASSESSMENT AND PLAN    1. Acute on chronic pancreatitis: The lipase is back to normal though CRP and white count continued to be elevated, repeat CT did not show any worsening of the pancreatitis or no complication suggestive of abscess or hemorrhage, CT did show multiple gallstones, we'll continue to keep him n.p.o. IV analgesics and we will consult surgery Gen. Surgery  Patient's Gee score is 1 which is consistent with mild pancreatitis    Diabetes: Continue to put him on a sliding scale only and will continue with Accu-Cheks 4 times a day. Next    Other chronic medical conditions:  Hypothyroidism  Hyperlipidemia  Anxiety disorder          Diet  N.p.o.    DVT Prophylaxis [x] Lovenox, [] Heparin, [] SCDs, [] Ambulation   GI Prophylaxis [] PPI, [x] H2 Blocker, [] Carafate, [] Diet/Tube Feeds   Code Status Full Code   Disposition Inpatient    MDM [] Low, [x] Moderate,[] High  Patient's risk as above due to complexity of data reviewed and medical management options                   Allergies  Pcn [penicillins]          Diagnosis Date    Arthritis     BPH (benign prostatic hyperplasia)     CPAP (continuous positive airway pressure) dependence     Diabetes mellitus ([facility]     dx     Erectile dysfunction     Hyperlipidemia     Sleep apnea     had sleep study done =- uses cpap nightly    Thyroid cancer ([facility] 2018    Total thyroidectomy     Wears dentures     upper    Wears glasses     \"just to read\"         Vitals    TEMPERATURE:  Current - Temp: 99.9 °F (37.7 °C);  Max - Temp  Av.9 °F (37.7 °C)  Min: 99.9 °F (37.7 °C)  Max: 99.9 °F (37.7 °C)  RESPIRATIONS RANGE: Resp  Av

## 2018-09-28 LAB
ALBUMIN SERPL-MCNC: 3.6 GM/DL (ref 3.4–5)
ALP BLD-CCNC: 54 IU/L (ref 40–129)
ALT SERPL-CCNC: 19 U/L (ref 10–40)
ANION GAP SERPL CALCULATED.3IONS-SCNC: 12 MMOL/L (ref 4–16)
AST SERPL-CCNC: 30 IU/L (ref 15–37)
BILIRUB SERPL-MCNC: 0.8 MG/DL (ref 0–1)
BILIRUBIN DIRECT: 0.2 MG/DL (ref 0–0.3)
BILIRUBIN, INDIRECT: 0.6 MG/DL (ref 0–0.7)
BUN BLDV-MCNC: 12 MG/DL (ref 6–23)
CALCIUM SERPL-MCNC: 7.7 MG/DL (ref 8.3–10.6)
CHLORIDE BLD-SCNC: 100 MMOL/L (ref 99–110)
CO2: 26 MMOL/L (ref 21–32)
CREAT SERPL-MCNC: 0.8 MG/DL (ref 0.9–1.3)
GFR AFRICAN AMERICAN: >60 ML/MIN/1.73M2
GFR NON-AFRICAN AMERICAN: >60 ML/MIN/1.73M2
GLUCOSE BLD-MCNC: 106 MG/DL (ref 70–99)
GLUCOSE BLD-MCNC: 118 MG/DL (ref 70–99)
GLUCOSE BLD-MCNC: 129 MG/DL (ref 70–99)
GLUCOSE BLD-MCNC: 99 MG/DL (ref 70–99)
GLUCOSE BLD-MCNC: 99 MG/DL (ref 70–99)
HCT VFR BLD CALC: 38.5 % (ref 42–52)
HEMOGLOBIN: 12.7 GM/DL (ref 13.5–18)
HIGH SENSITIVE C-REACTIVE PROTEIN: 257.2 MG/L
LIPASE: 23 IU/L (ref 13–60)
MCH RBC QN AUTO: 29.3 PG (ref 27–31)
MCHC RBC AUTO-ENTMCNC: 33 % (ref 32–36)
MCV RBC AUTO: 88.7 FL (ref 78–100)
PDW BLD-RTO: 12.1 % (ref 11.7–14.9)
PLATELET # BLD: 221 K/CU MM (ref 140–440)
PMV BLD AUTO: 9.5 FL (ref 7.5–11.1)
POTASSIUM SERPL-SCNC: 3.8 MMOL/L (ref 3.5–5.1)
RBC # BLD: 4.34 M/CU MM (ref 4.6–6.2)
SODIUM BLD-SCNC: 138 MMOL/L (ref 135–145)
TOTAL PROTEIN: 6.8 GM/DL (ref 6.4–8.2)
WBC # BLD: 11.2 K/CU MM (ref 4–10.5)

## 2018-09-28 PROCEDURE — 82248 BILIRUBIN DIRECT: CPT

## 2018-09-28 PROCEDURE — 6360000002 HC RX W HCPCS: Performed by: FAMILY MEDICINE

## 2018-09-28 PROCEDURE — 99231 SBSQ HOSP IP/OBS SF/LOW 25: CPT | Performed by: SURGERY

## 2018-09-28 PROCEDURE — 6360000002 HC RX W HCPCS: Performed by: NURSE PRACTITIONER

## 2018-09-28 PROCEDURE — 86141 C-REACTIVE PROTEIN HS: CPT

## 2018-09-28 PROCEDURE — 1200000000 HC SEMI PRIVATE

## 2018-09-28 PROCEDURE — 82962 GLUCOSE BLOOD TEST: CPT

## 2018-09-28 PROCEDURE — 80053 COMPREHEN METABOLIC PANEL: CPT

## 2018-09-28 PROCEDURE — 36415 COLL VENOUS BLD VENIPUNCTURE: CPT

## 2018-09-28 PROCEDURE — 83690 ASSAY OF LIPASE: CPT

## 2018-09-28 PROCEDURE — 2580000003 HC RX 258: Performed by: FAMILY MEDICINE

## 2018-09-28 PROCEDURE — 85027 COMPLETE CBC AUTOMATED: CPT

## 2018-09-28 PROCEDURE — 6370000000 HC RX 637 (ALT 250 FOR IP): Performed by: NURSE PRACTITIONER

## 2018-09-28 RX ORDER — SODIUM CHLORIDE 0.9 % (FLUSH) 0.9 %
10 SYRINGE (ML) INJECTION EVERY 12 HOURS SCHEDULED
Status: DISCONTINUED | OUTPATIENT
Start: 2018-09-28 | End: 2018-10-02 | Stop reason: HOSPADM

## 2018-09-28 RX ORDER — SODIUM CHLORIDE 0.9 % (FLUSH) 0.9 %
10 SYRINGE (ML) INJECTION PRN
Status: DISCONTINUED | OUTPATIENT
Start: 2018-09-28 | End: 2018-10-02 | Stop reason: HOSPADM

## 2018-09-28 RX ORDER — LIDOCAINE HYDROCHLORIDE 10 MG/ML
5 INJECTION, SOLUTION EPIDURAL; INFILTRATION; INTRACAUDAL; PERINEURAL ONCE
Status: COMPLETED | OUTPATIENT
Start: 2018-09-28 | End: 2018-09-29

## 2018-09-28 RX ADMIN — SODIUM CHLORIDE: 9 INJECTION, SOLUTION INTRAVENOUS at 08:16

## 2018-09-28 RX ADMIN — SIMVASTATIN 40 MG: 40 TABLET, FILM COATED ORAL at 20:29

## 2018-09-28 RX ADMIN — TRAZODONE HYDROCHLORIDE 100 MG: 50 TABLET ORAL at 20:29

## 2018-09-28 RX ADMIN — CALCIUM CARBONATE 500 MG: 500 TABLET, CHEWABLE ORAL at 09:21

## 2018-09-28 RX ADMIN — FAMOTIDINE 20 MG: 20 TABLET ORAL at 20:29

## 2018-09-28 RX ADMIN — FAMOTIDINE 20 MG: 20 TABLET ORAL at 09:21

## 2018-09-28 RX ADMIN — ASPIRIN 325 MG: 325 TABLET ORAL at 09:21

## 2018-09-28 RX ADMIN — HYDROMORPHONE HYDROCHLORIDE 1 MG: 1 INJECTION, SOLUTION INTRAMUSCULAR; INTRAVENOUS; SUBCUTANEOUS at 03:54

## 2018-09-28 RX ADMIN — HYDROMORPHONE HYDROCHLORIDE 1 MG: 1 INJECTION, SOLUTION INTRAMUSCULAR; INTRAVENOUS; SUBCUTANEOUS at 20:26

## 2018-09-28 RX ADMIN — ENOXAPARIN SODIUM 40 MG: 40 INJECTION SUBCUTANEOUS at 09:21

## 2018-09-28 RX ADMIN — GEMFIBROZIL 600 MG: 600 TABLET ORAL at 16:22

## 2018-09-28 RX ADMIN — HYDROMORPHONE HYDROCHLORIDE 0.5 MG: 1 INJECTION, SOLUTION INTRAMUSCULAR; INTRAVENOUS; SUBCUTANEOUS at 13:21

## 2018-09-28 RX ADMIN — HYDROMORPHONE HYDROCHLORIDE 0.5 MG: 1 INJECTION, SOLUTION INTRAMUSCULAR; INTRAVENOUS; SUBCUTANEOUS at 08:16

## 2018-09-28 RX ADMIN — LEVOTHYROXINE SODIUM 150 MCG: 150 TABLET ORAL at 06:28

## 2018-09-28 RX ADMIN — MELATONIN TAB 3 MG 3 MG: 3 TAB at 20:29

## 2018-09-28 RX ADMIN — HYDROMORPHONE HYDROCHLORIDE 1 MG: 1 INJECTION, SOLUTION INTRAMUSCULAR; INTRAVENOUS; SUBCUTANEOUS at 16:50

## 2018-09-28 RX ADMIN — VITAMIN D, TAB 1000IU (100/BT) 1000 UNITS: 25 TAB at 09:21

## 2018-09-28 RX ADMIN — OMEGA-3-ACID ETHYL ESTERS 1 G: 1 CAPSULE, LIQUID FILLED ORAL at 09:21

## 2018-09-28 RX ADMIN — GEMFIBROZIL 600 MG: 600 TABLET ORAL at 06:28

## 2018-09-28 ASSESSMENT — PAIN DESCRIPTION - LOCATION
LOCATION: ABDOMEN;BACK;RIB CAGE
LOCATION: ABDOMEN;BACK
LOCATION: ABDOMEN
LOCATION: ABDOMEN;BACK
LOCATION: ABDOMEN
LOCATION: ABDOMEN
LOCATION: ABDOMEN;BACK
LOCATION: ABDOMEN;BACK

## 2018-09-28 ASSESSMENT — PAIN DESCRIPTION - PAIN TYPE
TYPE: ACUTE PAIN
TYPE: ACUTE PAIN;CHRONIC PAIN
TYPE: ACUTE PAIN

## 2018-09-28 ASSESSMENT — PAIN DESCRIPTION - ORIENTATION
ORIENTATION: LOWER;MID
ORIENTATION: MID;LOWER

## 2018-09-28 ASSESSMENT — PAIN DESCRIPTION - FREQUENCY
FREQUENCY: CONTINUOUS

## 2018-09-28 ASSESSMENT — PAIN SCALES - GENERAL
PAINLEVEL_OUTOF10: 2
PAINLEVEL_OUTOF10: 5
PAINLEVEL_OUTOF10: 3
PAINLEVEL_OUTOF10: 3
PAINLEVEL_OUTOF10: 5
PAINLEVEL_OUTOF10: 4
PAINLEVEL_OUTOF10: 8
PAINLEVEL_OUTOF10: 7
PAINLEVEL_OUTOF10: 3
PAINLEVEL_OUTOF10: 8
PAINLEVEL_OUTOF10: 10
PAINLEVEL_OUTOF10: 10
PAINLEVEL_OUTOF10: 2
PAINLEVEL_OUTOF10: 7

## 2018-09-28 ASSESSMENT — PAIN DESCRIPTION - DESCRIPTORS
DESCRIPTORS: DISCOMFORT;PRESSURE
DESCRIPTORS: ACHING;SHARP
DESCRIPTORS: DISCOMFORT
DESCRIPTORS: ACHING
DESCRIPTORS: BURNING;CONSTANT
DESCRIPTORS: DISCOMFORT
DESCRIPTORS: CONSTANT;SHARP
DESCRIPTORS: DISCOMFORT;SHARP

## 2018-09-28 ASSESSMENT — PAIN DESCRIPTION - ONSET
ONSET: ON-GOING

## 2018-09-28 ASSESSMENT — ENCOUNTER SYMPTOMS
NAUSEA: 1
DIARRHEA: 0
HEARTBURN: 0
SPUTUM PRODUCTION: 0
ABDOMINAL PAIN: 1
COUGH: 0
CONSTIPATION: 0

## 2018-09-28 ASSESSMENT — PAIN DESCRIPTION - PROGRESSION
CLINICAL_PROGRESSION: NOT CHANGED
CLINICAL_PROGRESSION: GRADUALLY WORSENING

## 2018-09-28 NOTE — PROGRESS NOTES
PCP: Chris Mooney MD  Hospital Day: 5    Chief Complaint on Admission: Abdominal pain secondary to pancreatitis    SUBJECTIVE: Continues to having significant amount amount of abdominal pain and discomfort. ASSESSMENT AND PLAN    1. Acute on chronic pancreatitis: White count and CRP continued to remain significantly elevated, palpitations the abdominal pain also continues to worse and needing more and more Dilaudid patient also had a low-grade fever last night. We will await surgical consultation and we'll see what this is just.  One possibility is an overlying infection and but also could be complicated by pancreatitis itself      Diabetes: Continue to put him on a sliding scale only and will continue with Accu-Cheks 4 times a day. Next    Other chronic medical conditions:  Hypothyroidism  Hyperlipidemia  Anxiety disorder          Diet  N.p.o.    DVT Prophylaxis [x] Lovenox, [] Heparin, [] SCDs, [] Ambulation   GI Prophylaxis [] PPI, [x] H2 Blocker, [] Carafate, [] Diet/Tube Feeds   Code Status Full Code   Disposition Inpatient    MDM [] Low, [x] Moderate,[] High  Patient's risk as above due to complexity of data reviewed and medical management options                   Allergies  Pcn [penicillins]          Diagnosis Date    Arthritis     BPH (benign prostatic hyperplasia)     CPAP (continuous positive airway pressure) dependence     Diabetes mellitus ([facility]     dx     Erectile dysfunction     Hyperlipidemia     Sleep apnea     had sleep study done =- uses cpap nightly    Thyroid cancer ([facility] 2018    Total thyroidectomy     Wears dentures     upper    Wears glasses     \"just to read\"         Vitals    TEMPERATURE:  Current - Temp: 98.3 °F (36.8 °C);  Max - Temp  Av.3 °F (36.8 °C)  Min: 98.2 °F (36.8 °C)  Max: 98.4 °F (36.9 °C)  RESPIRATIONS RANGE: Resp  Av  Min: 16  Max: as well as words and phrases that may be inappropriate.  If there are any questions or concerns please feel free to contact the dictating provider for clarification

## 2018-09-29 ENCOUNTER — APPOINTMENT (OUTPATIENT)
Dept: GENERAL RADIOLOGY | Age: 73
DRG: 440 | End: 2018-09-29
Payer: MEDICARE

## 2018-09-29 LAB
ALBUMIN SERPL-MCNC: 3.8 GM/DL (ref 3.4–5)
ALP BLD-CCNC: 63 IU/L (ref 40–129)
ALT SERPL-CCNC: 20 U/L (ref 10–40)
ANION GAP SERPL CALCULATED.3IONS-SCNC: 20 MMOL/L (ref 4–16)
AST SERPL-CCNC: 31 IU/L (ref 15–37)
BILIRUB SERPL-MCNC: 0.8 MG/DL (ref 0–1)
BILIRUBIN DIRECT: 0.2 MG/DL (ref 0–0.3)
BILIRUBIN, INDIRECT: 0.6 MG/DL (ref 0–0.7)
BUN BLDV-MCNC: 9 MG/DL (ref 6–23)
CALCIUM SERPL-MCNC: 7.8 MG/DL (ref 8.3–10.6)
CHLORIDE BLD-SCNC: 98 MMOL/L (ref 99–110)
CO2: 20 MMOL/L (ref 21–32)
CREAT SERPL-MCNC: 0.7 MG/DL (ref 0.9–1.3)
GFR AFRICAN AMERICAN: >60 ML/MIN/1.73M2
GFR NON-AFRICAN AMERICAN: >60 ML/MIN/1.73M2
GLUCOSE BLD-MCNC: 114 MG/DL (ref 70–99)
GLUCOSE BLD-MCNC: 142 MG/DL (ref 70–99)
GLUCOSE BLD-MCNC: 168 MG/DL (ref 70–99)
GLUCOSE BLD-MCNC: 184 MG/DL (ref 70–99)
GLUCOSE BLD-MCNC: 219 MG/DL (ref 70–99)
HCT VFR BLD CALC: 38.3 % (ref 42–52)
HEMOGLOBIN: 12.7 GM/DL (ref 13.5–18)
HIGH SENSITIVE C-REACTIVE PROTEIN: 274.3 MG/L
MCH RBC QN AUTO: 29.1 PG (ref 27–31)
MCHC RBC AUTO-ENTMCNC: 33.2 % (ref 32–36)
MCV RBC AUTO: 87.8 FL (ref 78–100)
PDW BLD-RTO: 12.1 % (ref 11.7–14.9)
PLATELET # BLD: 211 K/CU MM (ref 140–440)
PMV BLD AUTO: 10.9 FL (ref 7.5–11.1)
POTASSIUM SERPL-SCNC: 3.8 MMOL/L (ref 3.5–5.1)
RBC # BLD: 4.36 M/CU MM (ref 4.6–6.2)
SODIUM BLD-SCNC: 138 MMOL/L (ref 135–145)
TOTAL PROTEIN: 6.1 GM/DL (ref 6.4–8.2)
WBC # BLD: 9.8 K/CU MM (ref 4–10.5)

## 2018-09-29 PROCEDURE — 80053 COMPREHEN METABOLIC PANEL: CPT

## 2018-09-29 PROCEDURE — 85027 COMPLETE CBC AUTOMATED: CPT

## 2018-09-29 PROCEDURE — 6360000002 HC RX W HCPCS: Performed by: NURSE PRACTITIONER

## 2018-09-29 PROCEDURE — 1200000000 HC SEMI PRIVATE

## 2018-09-29 PROCEDURE — 86141 C-REACTIVE PROTEIN HS: CPT

## 2018-09-29 PROCEDURE — 6360000002 HC RX W HCPCS: Performed by: FAMILY MEDICINE

## 2018-09-29 PROCEDURE — 2500000003 HC RX 250 WO HCPCS: Performed by: FAMILY MEDICINE

## 2018-09-29 PROCEDURE — 76937 US GUIDE VASCULAR ACCESS: CPT

## 2018-09-29 PROCEDURE — 6370000000 HC RX 637 (ALT 250 FOR IP): Performed by: NURSE PRACTITIONER

## 2018-09-29 PROCEDURE — 36415 COLL VENOUS BLD VENIPUNCTURE: CPT

## 2018-09-29 PROCEDURE — 06H033Z INSERTION OF INFUSION DEVICE INTO INFERIOR VENA CAVA, PERCUTANEOUS APPROACH: ICD-10-PCS | Performed by: EMERGENCY MEDICINE

## 2018-09-29 PROCEDURE — 82962 GLUCOSE BLOOD TEST: CPT

## 2018-09-29 PROCEDURE — 36569 INSJ PICC 5 YR+ W/O IMAGING: CPT

## 2018-09-29 PROCEDURE — 2500000003 HC RX 250 WO HCPCS: Performed by: HOSPITALIST

## 2018-09-29 PROCEDURE — 2580000003 HC RX 258: Performed by: FAMILY MEDICINE

## 2018-09-29 PROCEDURE — 82248 BILIRUBIN DIRECT: CPT

## 2018-09-29 PROCEDURE — 2580000003 HC RX 258: Performed by: NURSE PRACTITIONER

## 2018-09-29 PROCEDURE — 71045 X-RAY EXAM CHEST 1 VIEW: CPT

## 2018-09-29 RX ADMIN — HYDROMORPHONE HYDROCHLORIDE 0.5 MG: 1 INJECTION, SOLUTION INTRAMUSCULAR; INTRAVENOUS; SUBCUTANEOUS at 17:31

## 2018-09-29 RX ADMIN — LIDOCAINE HYDROCHLORIDE 5 ML: 10 INJECTION, SOLUTION EPIDURAL; INFILTRATION; INTRACAUDAL; PERINEURAL at 03:00

## 2018-09-29 RX ADMIN — INSULIN LISPRO 1 UNITS: 100 INJECTION, SOLUTION INTRAVENOUS; SUBCUTANEOUS at 09:30

## 2018-09-29 RX ADMIN — SODIUM CHLORIDE, PRESERVATIVE FREE 10 ML: 5 INJECTION INTRAVENOUS at 09:57

## 2018-09-29 RX ADMIN — CALCIUM CARBONATE 500 MG: 500 TABLET, CHEWABLE ORAL at 09:30

## 2018-09-29 RX ADMIN — HYDROMORPHONE HYDROCHLORIDE 0.5 MG: 1 INJECTION, SOLUTION INTRAMUSCULAR; INTRAVENOUS; SUBCUTANEOUS at 14:21

## 2018-09-29 RX ADMIN — ASPIRIN 325 MG: 325 TABLET ORAL at 09:30

## 2018-09-29 RX ADMIN — GEMFIBROZIL 600 MG: 600 TABLET ORAL at 06:47

## 2018-09-29 RX ADMIN — INSULIN LISPRO 1 UNITS: 100 INJECTION, SOLUTION INTRAVENOUS; SUBCUTANEOUS at 12:30

## 2018-09-29 RX ADMIN — SIMVASTATIN 40 MG: 40 TABLET, FILM COATED ORAL at 20:39

## 2018-09-29 RX ADMIN — I.V. FAT EMULSION 500 ML: 20 EMULSION INTRAVENOUS at 07:05

## 2018-09-29 RX ADMIN — HYDROMORPHONE HYDROCHLORIDE 0.5 MG: 1 INJECTION, SOLUTION INTRAMUSCULAR; INTRAVENOUS; SUBCUTANEOUS at 20:39

## 2018-09-29 RX ADMIN — HYDROMORPHONE HYDROCHLORIDE 1 MG: 1 INJECTION, SOLUTION INTRAMUSCULAR; INTRAVENOUS; SUBCUTANEOUS at 00:09

## 2018-09-29 RX ADMIN — GEMFIBROZIL 600 MG: 600 TABLET ORAL at 17:05

## 2018-09-29 RX ADMIN — FAMOTIDINE 20 MG: 20 TABLET ORAL at 20:39

## 2018-09-29 RX ADMIN — HYDROMORPHONE HYDROCHLORIDE 0.5 MG: 1 INJECTION, SOLUTION INTRAMUSCULAR; INTRAVENOUS; SUBCUTANEOUS at 04:16

## 2018-09-29 RX ADMIN — OMEGA-3-ACID ETHYL ESTERS 1 G: 1 CAPSULE, LIQUID FILLED ORAL at 09:30

## 2018-09-29 RX ADMIN — LEVOTHYROXINE SODIUM 150 MCG: 150 TABLET ORAL at 06:47

## 2018-09-29 RX ADMIN — VITAMIN D, TAB 1000IU (100/BT) 1000 UNITS: 25 TAB at 09:30

## 2018-09-29 RX ADMIN — INSULIN LISPRO 2 UNITS: 100 INJECTION, SOLUTION INTRAVENOUS; SUBCUTANEOUS at 20:40

## 2018-09-29 RX ADMIN — TRAZODONE HYDROCHLORIDE 100 MG: 50 TABLET ORAL at 20:39

## 2018-09-29 RX ADMIN — MELATONIN TAB 3 MG 3 MG: 3 TAB at 20:39

## 2018-09-29 RX ADMIN — INSULIN LISPRO 1 UNITS: 100 INJECTION, SOLUTION INTRAVENOUS; SUBCUTANEOUS at 17:30

## 2018-09-29 RX ADMIN — SODIUM CHLORIDE, PRESERVATIVE FREE 10 ML: 5 INJECTION INTRAVENOUS at 00:09

## 2018-09-29 RX ADMIN — FAMOTIDINE 20 MG: 20 TABLET ORAL at 09:30

## 2018-09-29 RX ADMIN — ASCORBIC ACID, VITAMIN A PALMITATE, CHOLECALCIFEROL, THIAMINE HYDROCHLORIDE, RIBOFLAVIN-5 PHOSPHATE SODIUM, PYRIDOXINE HYDROCHLORIDE, NIACINAMIDE, DEXPANTHENOL, ALPHA-TOCOPHEROL ACETATE, VITAMIN K1, FOLIC ACID, BIOTIN, CYANOCOBALAMIN: 200; 3300; 200; 6; 3.6; 6; 40; 15; 10; 150; 600; 60; 5 INJECTION, SOLUTION INTRAVENOUS at 18:36

## 2018-09-29 RX ADMIN — HYDROMORPHONE HYDROCHLORIDE 1 MG: 1 INJECTION, SOLUTION INTRAMUSCULAR; INTRAVENOUS; SUBCUTANEOUS at 23:38

## 2018-09-29 RX ADMIN — ENOXAPARIN SODIUM 40 MG: 40 INJECTION SUBCUTANEOUS at 09:31

## 2018-09-29 RX ADMIN — ASCORBIC ACID, VITAMIN A PALMITATE, CHOLECALCIFEROL, THIAMINE HYDROCHLORIDE, RIBOFLAVIN-5 PHOSPHATE SODIUM, PYRIDOXINE HYDROCHLORIDE, NIACINAMIDE, DEXPANTHENOL, ALPHA-TOCOPHEROL ACETATE, VITAMIN K1, FOLIC ACID, BIOTIN, CYANOCOBALAMIN: 200; 3300; 200; 6; 3.6; 6; 40; 15; 10; 150; 600; 60; 5 INJECTION, SOLUTION INTRAVENOUS at 07:05

## 2018-09-29 ASSESSMENT — PAIN DESCRIPTION - DESCRIPTORS
DESCRIPTORS: SHARP;SQUEEZING
DESCRIPTORS: ACHING;BURNING

## 2018-09-29 ASSESSMENT — PAIN DESCRIPTION - LOCATION
LOCATION: ABDOMEN;BACK
LOCATION: ABDOMEN
LOCATION: ABDOMEN;BACK
LOCATION: ABDOMEN
LOCATION: ABDOMEN

## 2018-09-29 ASSESSMENT — PAIN DESCRIPTION - FREQUENCY
FREQUENCY: INTERMITTENT
FREQUENCY: INTERMITTENT

## 2018-09-29 ASSESSMENT — PAIN SCALES - GENERAL
PAINLEVEL_OUTOF10: 6
PAINLEVEL_OUTOF10: 0
PAINLEVEL_OUTOF10: 2
PAINLEVEL_OUTOF10: 5
PAINLEVEL_OUTOF10: 4
PAINLEVEL_OUTOF10: 10
PAINLEVEL_OUTOF10: 7
PAINLEVEL_OUTOF10: 5

## 2018-09-29 ASSESSMENT — PAIN DESCRIPTION - PAIN TYPE
TYPE: ACUTE PAIN

## 2018-09-29 ASSESSMENT — PAIN DESCRIPTION - ORIENTATION
ORIENTATION: MID
ORIENTATION: LOWER;MID

## 2018-09-29 NOTE — PROGRESS NOTES
PCP: Reji Harmon MD  Hospital Day: 6    Chief Complaint on Admission: Abdominal pain secondary to pancreatitis    SUBJECTIVE:   -tolerated a liquid diet today, feels the abdominal pain is much improved. Had a PICC placed and tolerating TPN    OBJECTIVE:    General appearance:  no acute distress  HEENT: atraumatic & normocephalic  CVS: normal O9A3 RRR  Respi: cta b/l bs+  Abdo: soft ND, mild tenderness in epigastric area  CNS: no gross deficits, aao  Skin: no rash, purpurea or eruptions  MS: no muscle tenderness, normal ROM in all major Joints    ASSESSMENT AND PLAN    Acute on chronic pancreatitis:   -surgery recommends removal of gallbladder once pancreatitis has resolved. Likely due to gallstones. Stared on TPN. Patient is feeling better today    Diabetes:  -SSI    Other chronic medical conditions:  Hypothyroidism  Hyperlipidemia  Anxiety disorder          Diet  N.p.o.    DVT Prophylaxis [x] Lovenox, [] Heparin, [] SCDs, [] Ambulation   GI Prophylaxis [] PPI, [x] H2 Blocker, [] Carafate, [] Diet/Tube Feeds   Code Status Full Code   Disposition Inpatient    MDM [] Low, [x] Moderate,[] High  Patient's risk as above due to complexity of data reviewed and medical management options                   Allergies  Pcn [penicillins]          Diagnosis Date    Arthritis     BPH (benign prostatic hyperplasia)     CPAP (continuous positive airway pressure) dependence     Diabetes mellitus ([facility]     dx     Erectile dysfunction     Hyperlipidemia     Sleep apnea     had sleep study done =- uses cpap nightly    Thyroid cancer ([facility] 2018    Total thyroidectomy     Wears dentures     upper    Wears glasses     \"just to read\"         Vitals    TEMPERATURE:  Current - Temp: 98 °F (36.7 °C);  Max - Temp  Av.6 °F (37 °C)  Min: 98 °F (36.7 °C)  Max: 99.5 °F (37.5 °C)  RESPIRATIONS RANGE: Resp  Av

## 2018-09-29 NOTE — CONSULTS
Education regarding the insertion of a PICC was reviewed with the patient. The risks, benefits, alternatives were discussed and their understanding is verbalized. Consent is given by the client. A procedural time-out is performed at 0330. The PICC line is inserted in the right upper arm with and without difficulty and per protocol. The client tolerated the procedure well. There is adequate blood return visualized and the lumen(s) flush easily. The education booklet is left at bedside.

## 2018-09-30 LAB
ALBUMIN SERPL-MCNC: 3.8 GM/DL (ref 3.4–5)
ALP BLD-CCNC: 67 IU/L (ref 40–129)
ALT SERPL-CCNC: 26 U/L (ref 10–40)
ANION GAP SERPL CALCULATED.3IONS-SCNC: 12 MMOL/L (ref 4–16)
AST SERPL-CCNC: 31 IU/L (ref 15–37)
BILIRUB SERPL-MCNC: 0.7 MG/DL (ref 0–1)
BILIRUBIN DIRECT: 0.2 MG/DL (ref 0–0.3)
BILIRUBIN, INDIRECT: 0.5 MG/DL (ref 0–0.7)
BUN BLDV-MCNC: 8 MG/DL (ref 6–23)
CALCIUM SERPL-MCNC: 8.2 MG/DL (ref 8.3–10.6)
CHLORIDE BLD-SCNC: 97 MMOL/L (ref 99–110)
CO2: 28 MMOL/L (ref 21–32)
CREAT SERPL-MCNC: 0.7 MG/DL (ref 0.9–1.3)
GFR AFRICAN AMERICAN: >60 ML/MIN/1.73M2
GFR NON-AFRICAN AMERICAN: >60 ML/MIN/1.73M2
GLUCOSE BLD-MCNC: 175 MG/DL (ref 70–99)
GLUCOSE BLD-MCNC: 179 MG/DL (ref 70–99)
GLUCOSE BLD-MCNC: 190 MG/DL (ref 70–99)
GLUCOSE BLD-MCNC: 221 MG/DL (ref 70–99)
GLUCOSE BLD-MCNC: 233 MG/DL (ref 70–99)
HCT VFR BLD CALC: 38.8 % (ref 42–52)
HEMOGLOBIN: 12.9 GM/DL (ref 13.5–18)
HIGH SENSITIVE C-REACTIVE PROTEIN: 217.1 MG/L
MAGNESIUM: 2 MG/DL (ref 1.8–2.4)
MCH RBC QN AUTO: 28.9 PG (ref 27–31)
MCHC RBC AUTO-ENTMCNC: 33.2 % (ref 32–36)
MCV RBC AUTO: 86.8 FL (ref 78–100)
PDW BLD-RTO: 12 % (ref 11.7–14.9)
PHOSPHORUS: 2.9 MG/DL (ref 2.5–4.9)
PLATELET # BLD: 232 K/CU MM (ref 140–440)
PMV BLD AUTO: 9.8 FL (ref 7.5–11.1)
POTASSIUM SERPL-SCNC: 3.7 MMOL/L (ref 3.5–5.1)
RBC # BLD: 4.47 M/CU MM (ref 4.6–6.2)
SODIUM BLD-SCNC: 137 MMOL/L (ref 135–145)
TOTAL PROTEIN: 6.4 GM/DL (ref 6.4–8.2)
WBC # BLD: 10 K/CU MM (ref 4–10.5)

## 2018-09-30 PROCEDURE — 80053 COMPREHEN METABOLIC PANEL: CPT

## 2018-09-30 PROCEDURE — 6360000002 HC RX W HCPCS: Performed by: FAMILY MEDICINE

## 2018-09-30 PROCEDURE — 6370000000 HC RX 637 (ALT 250 FOR IP): Performed by: NURSE PRACTITIONER

## 2018-09-30 PROCEDURE — 6370000000 HC RX 637 (ALT 250 FOR IP): Performed by: HOSPITALIST

## 2018-09-30 PROCEDURE — 84100 ASSAY OF PHOSPHORUS: CPT

## 2018-09-30 PROCEDURE — 2580000003 HC RX 258: Performed by: NURSE PRACTITIONER

## 2018-09-30 PROCEDURE — 83735 ASSAY OF MAGNESIUM: CPT

## 2018-09-30 PROCEDURE — 86141 C-REACTIVE PROTEIN HS: CPT

## 2018-09-30 PROCEDURE — 85027 COMPLETE CBC AUTOMATED: CPT

## 2018-09-30 PROCEDURE — 82962 GLUCOSE BLOOD TEST: CPT

## 2018-09-30 PROCEDURE — 2580000003 HC RX 258: Performed by: FAMILY MEDICINE

## 2018-09-30 PROCEDURE — 82248 BILIRUBIN DIRECT: CPT

## 2018-09-30 PROCEDURE — 6360000002 HC RX W HCPCS: Performed by: NURSE PRACTITIONER

## 2018-09-30 PROCEDURE — 1200000000 HC SEMI PRIVATE

## 2018-09-30 PROCEDURE — 36415 COLL VENOUS BLD VENIPUNCTURE: CPT

## 2018-09-30 RX ORDER — CYCLOBENZAPRINE HCL 10 MG
10 TABLET ORAL 3 TIMES DAILY PRN
Status: DISCONTINUED | OUTPATIENT
Start: 2018-09-30 | End: 2018-10-02 | Stop reason: HOSPADM

## 2018-09-30 RX ORDER — TRAMADOL HYDROCHLORIDE 50 MG/1
50 TABLET ORAL EVERY 6 HOURS PRN
Status: DISCONTINUED | OUTPATIENT
Start: 2018-09-30 | End: 2018-10-02 | Stop reason: HOSPADM

## 2018-09-30 RX ADMIN — GEMFIBROZIL 600 MG: 600 TABLET ORAL at 06:14

## 2018-09-30 RX ADMIN — SODIUM CHLORIDE, PRESERVATIVE FREE 10 ML: 5 INJECTION INTRAVENOUS at 09:09

## 2018-09-30 RX ADMIN — HYDROMORPHONE HYDROCHLORIDE 1 MG: 1 INJECTION, SOLUTION INTRAMUSCULAR; INTRAVENOUS; SUBCUTANEOUS at 03:38

## 2018-09-30 RX ADMIN — OMEGA-3-ACID ETHYL ESTERS 1 G: 1 CAPSULE, LIQUID FILLED ORAL at 09:06

## 2018-09-30 RX ADMIN — SIMVASTATIN 40 MG: 40 TABLET, FILM COATED ORAL at 21:29

## 2018-09-30 RX ADMIN — MELATONIN TAB 3 MG 3 MG: 3 TAB at 21:29

## 2018-09-30 RX ADMIN — GEMFIBROZIL 600 MG: 600 TABLET ORAL at 17:37

## 2018-09-30 RX ADMIN — INSULIN LISPRO 1 UNITS: 100 INJECTION, SOLUTION INTRAVENOUS; SUBCUTANEOUS at 21:34

## 2018-09-30 RX ADMIN — HYDROMORPHONE HYDROCHLORIDE 1 MG: 1 INJECTION, SOLUTION INTRAMUSCULAR; INTRAVENOUS; SUBCUTANEOUS at 13:49

## 2018-09-30 RX ADMIN — VITAMIN D, TAB 1000IU (100/BT) 1000 UNITS: 25 TAB at 09:06

## 2018-09-30 RX ADMIN — ENOXAPARIN SODIUM 40 MG: 40 INJECTION SUBCUTANEOUS at 09:06

## 2018-09-30 RX ADMIN — FAMOTIDINE 20 MG: 20 TABLET ORAL at 21:29

## 2018-09-30 RX ADMIN — FAMOTIDINE 20 MG: 20 TABLET ORAL at 09:06

## 2018-09-30 RX ADMIN — INSULIN LISPRO 1 UNITS: 100 INJECTION, SOLUTION INTRAVENOUS; SUBCUTANEOUS at 17:37

## 2018-09-30 RX ADMIN — SODIUM CHLORIDE, PRESERVATIVE FREE 10 ML: 5 INJECTION INTRAVENOUS at 21:30

## 2018-09-30 RX ADMIN — TRAMADOL HYDROCHLORIDE 50 MG: 50 TABLET, FILM COATED ORAL at 18:26

## 2018-09-30 RX ADMIN — LEVOTHYROXINE SODIUM 150 MCG: 150 TABLET ORAL at 06:14

## 2018-09-30 RX ADMIN — ASPIRIN 325 MG: 325 TABLET ORAL at 09:06

## 2018-09-30 RX ADMIN — ACETAMINOPHEN 650 MG: 325 TABLET, FILM COATED ORAL at 15:15

## 2018-09-30 RX ADMIN — CYCLOBENZAPRINE HYDROCHLORIDE 10 MG: 10 TABLET, FILM COATED ORAL at 19:43

## 2018-09-30 RX ADMIN — TRAZODONE HYDROCHLORIDE 100 MG: 50 TABLET ORAL at 21:29

## 2018-09-30 RX ADMIN — INSULIN LISPRO 2 UNITS: 100 INJECTION, SOLUTION INTRAVENOUS; SUBCUTANEOUS at 09:06

## 2018-09-30 RX ADMIN — INSULIN LISPRO 1 UNITS: 100 INJECTION, SOLUTION INTRAVENOUS; SUBCUTANEOUS at 12:53

## 2018-09-30 RX ADMIN — CALCIUM CARBONATE 500 MG: 500 TABLET, CHEWABLE ORAL at 09:06

## 2018-09-30 RX ADMIN — HYDROMORPHONE HYDROCHLORIDE 1 MG: 1 INJECTION, SOLUTION INTRAMUSCULAR; INTRAVENOUS; SUBCUTANEOUS at 07:08

## 2018-09-30 ASSESSMENT — PAIN SCALES - GENERAL
PAINLEVEL_OUTOF10: 4
PAINLEVEL_OUTOF10: 0
PAINLEVEL_OUTOF10: 8
PAINLEVEL_OUTOF10: 6
PAINLEVEL_OUTOF10: 7
PAINLEVEL_OUTOF10: 7
PAINLEVEL_OUTOF10: 9
PAINLEVEL_OUTOF10: 6
PAINLEVEL_OUTOF10: 7
PAINLEVEL_OUTOF10: 4
PAINLEVEL_OUTOF10: 0
PAINLEVEL_OUTOF10: 0
PAINLEVEL_OUTOF10: 9

## 2018-09-30 ASSESSMENT — PAIN DESCRIPTION - FREQUENCY
FREQUENCY: INTERMITTENT
FREQUENCY: INTERMITTENT

## 2018-09-30 ASSESSMENT — PAIN DESCRIPTION - DESCRIPTORS
DESCRIPTORS: ACHING;SHARP
DESCRIPTORS: SHARP

## 2018-09-30 ASSESSMENT — PAIN DESCRIPTION - PAIN TYPE
TYPE: ACUTE PAIN

## 2018-09-30 ASSESSMENT — PAIN DESCRIPTION - LOCATION
LOCATION: NECK
LOCATION: BACK;NECK
LOCATION: ABDOMEN
LOCATION: ABDOMEN;NECK

## 2018-09-30 NOTE — PROGRESS NOTES
97.4 °F (36.3 °C)  Max: 97.4 °F (36.3 °C)  RESPIRATIONS RANGE: Resp  Av  Min: 16  Max: 16  PULSE RANGE: Pulse  Av.5  Min: 68  Max: 83  BLOOD PRESSURE RANGE:  Systolic (90QJL), PCJ:984 , Min:161 , ZJK:834   ; Diastolic (98EUB), GS, Min:83, Max:88    PULSE OXIMETRY RANGE: SpO2  Av %  Min: 95 %  Max: 97 %  24HR INTAKE/OUTPUT:      Intake/Output Summary (Last 24 hours) at 18 1036  Last data filed at 18 0909   Gross per 24 hour   Intake          1548.73 ml   Output              450 ml   Net          1098.73 ml             Data    Recent Labs      18   0818   0630   WBC  11.2*  9.8  10.0   HGB  12.7*  12.7*  12.9*   HCT  38.5*  38.3*  38.8*   PLT  221  211  232      Recent Labs      18   0800  18   0630   NA  138  138  137   K  3.8  3.8  3.7   CL  100  98*  97*   CO2  26  20*  28   PHOS   --    --   2.9   BUN  12  9  8   CREATININE  0.8*  0.7*  0.7*     Recent Labs      18   0930  18   0818   0630   AST  30  31  31   ALT  19  20  26   BILIDIR  0.2  0.2  0.2   BILITOT  0.8  0.8  0.7   ALKPHOS  54  63  67     Meds:    sodium chloride flush  10 mL Intravenous 2 times per day    aspirin  325 mg Oral Daily    calcium carbonate  500 mg Oral Daily    gemfibrozil  600 mg Oral BID AC    levothyroxine  150 mcg Oral Daily    melatonin  3 mg Oral Nightly    simvastatin  40 mg Oral Nightly    traZODone  100 mg Oral Nightly    sodium chloride flush  10 mL Intravenous 2 times per day    enoxaparin  40 mg Subcutaneous Daily    famotidine  20 mg Oral BID    omega-3 acid ethyl esters  1 g Oral Daily    And    vitamin D3  1,000 Units Oral Daily    insulin lispro  0-6 Units Subcutaneous 4x Daily AC & HS      Infusions:    PN-Adult Premix 5/15 - Standard Electrolytes - Central Line 50 mL/hr at 18    fat emulsion Stopped (18)    dextrose       PRN Meds:     sodium chloride flush 10 mL PRN

## 2018-10-01 ENCOUNTER — APPOINTMENT (OUTPATIENT)
Dept: CT IMAGING | Age: 73
DRG: 440 | End: 2018-10-01
Payer: MEDICARE

## 2018-10-01 LAB
ALBUMIN SERPL-MCNC: 4 GM/DL (ref 3.4–5)
ALP BLD-CCNC: 77 IU/L (ref 40–129)
ALT SERPL-CCNC: 33 U/L (ref 10–40)
ANION GAP SERPL CALCULATED.3IONS-SCNC: 16 MMOL/L (ref 4–16)
AST SERPL-CCNC: 38 IU/L (ref 15–37)
BILIRUB SERPL-MCNC: 0.7 MG/DL (ref 0–1)
BILIRUBIN DIRECT: 0.2 MG/DL (ref 0–0.3)
BILIRUBIN, INDIRECT: 0.5 MG/DL (ref 0–0.7)
BUN BLDV-MCNC: 6 MG/DL (ref 6–23)
CALCIUM SERPL-MCNC: 8.7 MG/DL (ref 8.3–10.6)
CHLORIDE BLD-SCNC: 99 MMOL/L (ref 99–110)
CO2: 26 MMOL/L (ref 21–32)
CREAT SERPL-MCNC: 0.7 MG/DL (ref 0.9–1.3)
GFR AFRICAN AMERICAN: >60 ML/MIN/1.73M2
GFR NON-AFRICAN AMERICAN: >60 ML/MIN/1.73M2
GLUCOSE BLD-MCNC: 144 MG/DL (ref 70–99)
GLUCOSE BLD-MCNC: 170 MG/DL (ref 70–99)
GLUCOSE BLD-MCNC: 172 MG/DL (ref 70–99)
GLUCOSE BLD-MCNC: 193 MG/DL (ref 70–99)
GLUCOSE BLD-MCNC: 203 MG/DL (ref 70–99)
HCT VFR BLD CALC: 41.1 % (ref 42–52)
HEMOGLOBIN: 13.6 GM/DL (ref 13.5–18)
HIGH SENSITIVE C-REACTIVE PROTEIN: 189.4 MG/L
MCH RBC QN AUTO: 28.9 PG (ref 27–31)
MCHC RBC AUTO-ENTMCNC: 33.1 % (ref 32–36)
MCV RBC AUTO: 87.4 FL (ref 78–100)
PDW BLD-RTO: 12 % (ref 11.7–14.9)
PLATELET # BLD: 274 K/CU MM (ref 140–440)
PMV BLD AUTO: 10.3 FL (ref 7.5–11.1)
POTASSIUM SERPL-SCNC: 4.3 MMOL/L (ref 3.5–5.1)
RBC # BLD: 4.7 M/CU MM (ref 4.6–6.2)
SODIUM BLD-SCNC: 141 MMOL/L (ref 135–145)
TOTAL PROTEIN: 6.7 GM/DL (ref 6.4–8.2)
WBC # BLD: 9.1 K/CU MM (ref 4–10.5)

## 2018-10-01 PROCEDURE — 6370000000 HC RX 637 (ALT 250 FOR IP): Performed by: HOSPITALIST

## 2018-10-01 PROCEDURE — 85027 COMPLETE CBC AUTOMATED: CPT

## 2018-10-01 PROCEDURE — 82962 GLUCOSE BLOOD TEST: CPT

## 2018-10-01 PROCEDURE — 2580000003 HC RX 258: Performed by: FAMILY MEDICINE

## 2018-10-01 PROCEDURE — 82248 BILIRUBIN DIRECT: CPT

## 2018-10-01 PROCEDURE — 6370000000 HC RX 637 (ALT 250 FOR IP): Performed by: NURSE PRACTITIONER

## 2018-10-01 PROCEDURE — 2580000003 HC RX 258: Performed by: NURSE PRACTITIONER

## 2018-10-01 PROCEDURE — 97162 PT EVAL MOD COMPLEX 30 MIN: CPT

## 2018-10-01 PROCEDURE — 86141 C-REACTIVE PROTEIN HS: CPT

## 2018-10-01 PROCEDURE — G8991 OTHER PT/OT GOAL STATUS: HCPCS

## 2018-10-01 PROCEDURE — 72125 CT NECK SPINE W/O DYE: CPT

## 2018-10-01 PROCEDURE — 80053 COMPREHEN METABOLIC PANEL: CPT

## 2018-10-01 PROCEDURE — 6360000002 HC RX W HCPCS: Performed by: NURSE PRACTITIONER

## 2018-10-01 PROCEDURE — 1200000000 HC SEMI PRIVATE

## 2018-10-01 PROCEDURE — G8990 OTHER PT/OT CURRENT STATUS: HCPCS

## 2018-10-01 PROCEDURE — 97110 THERAPEUTIC EXERCISES: CPT

## 2018-10-01 PROCEDURE — 36415 COLL VENOUS BLD VENIPUNCTURE: CPT

## 2018-10-01 PROCEDURE — 97140 MANUAL THERAPY 1/> REGIONS: CPT

## 2018-10-01 RX ADMIN — INSULIN LISPRO 1 UNITS: 100 INJECTION, SOLUTION INTRAVENOUS; SUBCUTANEOUS at 20:14

## 2018-10-01 RX ADMIN — INSULIN LISPRO 1 UNITS: 100 INJECTION, SOLUTION INTRAVENOUS; SUBCUTANEOUS at 17:06

## 2018-10-01 RX ADMIN — GEMFIBROZIL 600 MG: 600 TABLET ORAL at 17:05

## 2018-10-01 RX ADMIN — OMEGA-3-ACID ETHYL ESTERS 1 G: 1 CAPSULE, LIQUID FILLED ORAL at 09:47

## 2018-10-01 RX ADMIN — TRAMADOL HYDROCHLORIDE 50 MG: 50 TABLET, FILM COATED ORAL at 22:13

## 2018-10-01 RX ADMIN — CYCLOBENZAPRINE HYDROCHLORIDE 10 MG: 10 TABLET, FILM COATED ORAL at 22:46

## 2018-10-01 RX ADMIN — VITAMIN D, TAB 1000IU (100/BT) 1000 UNITS: 25 TAB at 09:47

## 2018-10-01 RX ADMIN — ENOXAPARIN SODIUM 40 MG: 40 INJECTION SUBCUTANEOUS at 09:47

## 2018-10-01 RX ADMIN — FAMOTIDINE 20 MG: 20 TABLET ORAL at 09:47

## 2018-10-01 RX ADMIN — CYCLOBENZAPRINE HYDROCHLORIDE 10 MG: 10 TABLET, FILM COATED ORAL at 03:39

## 2018-10-01 RX ADMIN — FAMOTIDINE 20 MG: 20 TABLET ORAL at 20:13

## 2018-10-01 RX ADMIN — INSULIN LISPRO 1 UNITS: 100 INJECTION, SOLUTION INTRAVENOUS; SUBCUTANEOUS at 09:48

## 2018-10-01 RX ADMIN — LEVOTHYROXINE SODIUM 150 MCG: 150 TABLET ORAL at 06:25

## 2018-10-01 RX ADMIN — MELATONIN TAB 3 MG 3 MG: 3 TAB at 20:13

## 2018-10-01 RX ADMIN — SODIUM CHLORIDE, PRESERVATIVE FREE 10 ML: 5 INJECTION INTRAVENOUS at 09:52

## 2018-10-01 RX ADMIN — ASPIRIN 325 MG: 325 TABLET ORAL at 09:47

## 2018-10-01 RX ADMIN — SODIUM CHLORIDE, PRESERVATIVE FREE 10 ML: 5 INJECTION INTRAVENOUS at 09:48

## 2018-10-01 RX ADMIN — SIMVASTATIN 40 MG: 40 TABLET, FILM COATED ORAL at 20:13

## 2018-10-01 RX ADMIN — SODIUM CHLORIDE, PRESERVATIVE FREE 10 ML: 5 INJECTION INTRAVENOUS at 20:13

## 2018-10-01 RX ADMIN — CALCIUM CARBONATE 500 MG: 500 TABLET, CHEWABLE ORAL at 09:47

## 2018-10-01 RX ADMIN — GEMFIBROZIL 600 MG: 600 TABLET ORAL at 06:25

## 2018-10-01 RX ADMIN — TRAZODONE HYDROCHLORIDE 100 MG: 50 TABLET ORAL at 20:13

## 2018-10-01 RX ADMIN — TRAMADOL HYDROCHLORIDE 50 MG: 50 TABLET, FILM COATED ORAL at 00:33

## 2018-10-01 RX ADMIN — INSULIN LISPRO 2 UNITS: 100 INJECTION, SOLUTION INTRAVENOUS; SUBCUTANEOUS at 12:37

## 2018-10-01 ASSESSMENT — PAIN DESCRIPTION - ORIENTATION
ORIENTATION: LOWER
ORIENTATION: LOWER

## 2018-10-01 ASSESSMENT — PAIN DESCRIPTION - PROGRESSION
CLINICAL_PROGRESSION: GRADUALLY IMPROVING
CLINICAL_PROGRESSION: GRADUALLY WORSENING

## 2018-10-01 ASSESSMENT — PAIN DESCRIPTION - DESCRIPTORS
DESCRIPTORS: SHARP
DESCRIPTORS: SHARP
DESCRIPTORS: SHARP;SHOOTING
DESCRIPTORS: SPASM

## 2018-10-01 ASSESSMENT — PAIN DESCRIPTION - LOCATION
LOCATION: BACK;ABDOMEN
LOCATION: BACK
LOCATION: HEAD;NECK

## 2018-10-01 ASSESSMENT — PAIN DESCRIPTION - PAIN TYPE
TYPE: ACUTE PAIN

## 2018-10-01 ASSESSMENT — PAIN DESCRIPTION - FREQUENCY
FREQUENCY: INTERMITTENT

## 2018-10-01 ASSESSMENT — PAIN DESCRIPTION - ONSET
ONSET: GRADUAL
ONSET: ON-GOING

## 2018-10-01 ASSESSMENT — PAIN SCALES - GENERAL
PAINLEVEL_OUTOF10: 7
PAINLEVEL_OUTOF10: 5
PAINLEVEL_OUTOF10: 0
PAINLEVEL_OUTOF10: 10
PAINLEVEL_OUTOF10: 0
PAINLEVEL_OUTOF10: 0
PAINLEVEL_OUTOF10: 5
PAINLEVEL_OUTOF10: 0
PAINLEVEL_OUTOF10: 4
PAINLEVEL_OUTOF10: 7

## 2018-10-01 NOTE — PROGRESS NOTES
Physical Therapy    Facility/Department: [facility] UNIT  Initial Assessment    NAME: Kaylen Lorenzo  : 1945  MRN: [id number]    Date of Service: 10/1/2018    Discharge Recommendations:  Continue to assess pending progress, Outpatient PT (Also, recommend outpatient speech therapy to address occasional difficulty swallowing. )   PT Equipment Recommendations  Equipment Needed: No    Patient Diagnosis(es): The primary encounter diagnosis was Nausea and vomiting in adult. Diagnoses of Idiopathic acute pancreatitis without infection or necrosis, Type 2 diabetes mellitus with complication, without long-term current use of insulin ([address] and Arthritis were also pertinent to this visit. has a past medical history of Arthritis; BPH (benign prostatic hyperplasia); CPAP (continuous positive airway pressure) dependence; Diabetes mellitus ([address] Erectile dysfunction; Hyperlipidemia; Sleep apnea; Thyroid cancer ([address] Wears dentures; and Wears glasses. has a past surgical history that includes Rotator cuff repair (Left, ); Colonoscopy; Colonoscopy (2013); Cataract removal (Bilateral, 2015); Appendectomy (); Carpal tunnel release (Right, ); hernia repair (Right, ); and eye surgery (Right). Restrictions  Position Activity Restriction  Other position/activity restrictions: Pt reports difficulty with ROM and severe sharp shooting pain when he moves his head a certain way. Vision/Hearing  Vision: Impaired  Vision Exceptions: Wears glasses at all times  Hearing: Within functional limits     Subjective  General  Chart Reviewed: Yes  Patient assessed for rehabilitation services?: Yes  Family / Caregiver Present: Yes (pt wife present)  Follows Commands: Within Functional Limits  General Comment  Comments: Pt presented in chair. Subjective  Subjective: Pt reports he doesnt know what caused his pain.    Pain Screening  Patient Currently in Pain: Yes  Pain Assessment  Pain Assessment:

## 2018-10-02 VITALS
HEIGHT: 67 IN | RESPIRATION RATE: 16 BRPM | SYSTOLIC BLOOD PRESSURE: 123 MMHG | TEMPERATURE: 97.4 F | HEART RATE: 76 BPM | DIASTOLIC BLOOD PRESSURE: 80 MMHG | WEIGHT: 180.63 LBS | BODY MASS INDEX: 28.35 KG/M2 | OXYGEN SATURATION: 96 %

## 2018-10-02 PROBLEM — K85.90 ACUTE ON CHRONIC PANCREATITIS (HCC): Status: RESOLVED | Noted: 2018-09-25 | Resolved: 2018-10-02

## 2018-10-02 PROBLEM — K86.1 ACUTE ON CHRONIC PANCREATITIS (HCC): Status: RESOLVED | Noted: 2018-09-25 | Resolved: 2018-10-02

## 2018-10-02 PROBLEM — K86.1 IDIOPATHIC CHRONIC PANCREATITIS (HCC): Chronic | Status: ACTIVE | Noted: 2018-10-02

## 2018-10-02 LAB
ALBUMIN SERPL-MCNC: 4.1 GM/DL (ref 3.4–5)
ALP BLD-CCNC: 79 IU/L (ref 40–129)
ALT SERPL-CCNC: 35 U/L (ref 10–40)
ANION GAP SERPL CALCULATED.3IONS-SCNC: 18 MMOL/L (ref 4–16)
AST SERPL-CCNC: 35 IU/L (ref 15–37)
BILIRUB SERPL-MCNC: 0.6 MG/DL (ref 0–1)
BILIRUBIN DIRECT: 0.2 MG/DL (ref 0–0.3)
BILIRUBIN, INDIRECT: 0.4 MG/DL (ref 0–0.7)
BUN BLDV-MCNC: 8 MG/DL (ref 6–23)
CALCIUM SERPL-MCNC: 8.8 MG/DL (ref 8.3–10.6)
CHLORIDE BLD-SCNC: 97 MMOL/L (ref 99–110)
CO2: 25 MMOL/L (ref 21–32)
CREAT SERPL-MCNC: 0.8 MG/DL (ref 0.9–1.3)
GFR AFRICAN AMERICAN: >60 ML/MIN/1.73M2
GFR NON-AFRICAN AMERICAN: >60 ML/MIN/1.73M2
GLUCOSE BLD-MCNC: 157 MG/DL (ref 70–99)
GLUCOSE BLD-MCNC: 164 MG/DL (ref 70–99)
HCT VFR BLD CALC: 42.9 % (ref 42–52)
HEMOGLOBIN: 14.2 GM/DL (ref 13.5–18)
HIGH SENSITIVE C-REACTIVE PROTEIN: 154.3 MG/L
MCH RBC QN AUTO: 29 PG (ref 27–31)
MCHC RBC AUTO-ENTMCNC: 33.1 % (ref 32–36)
MCV RBC AUTO: 87.6 FL (ref 78–100)
PDW BLD-RTO: 12 % (ref 11.7–14.9)
PLATELET # BLD: 305 K/CU MM (ref 140–440)
PMV BLD AUTO: 9.8 FL (ref 7.5–11.1)
POTASSIUM SERPL-SCNC: 4.4 MMOL/L (ref 3.5–5.1)
RBC # BLD: 4.9 M/CU MM (ref 4.6–6.2)
SODIUM BLD-SCNC: 140 MMOL/L (ref 135–145)
TOTAL PROTEIN: 6.9 GM/DL (ref 6.4–8.2)
WBC # BLD: 8.3 K/CU MM (ref 4–10.5)

## 2018-10-02 PROCEDURE — 36415 COLL VENOUS BLD VENIPUNCTURE: CPT

## 2018-10-02 PROCEDURE — 2580000003 HC RX 258: Performed by: FAMILY MEDICINE

## 2018-10-02 PROCEDURE — 6360000002 HC RX W HCPCS: Performed by: NURSE PRACTITIONER

## 2018-10-02 PROCEDURE — 86141 C-REACTIVE PROTEIN HS: CPT

## 2018-10-02 PROCEDURE — 82962 GLUCOSE BLOOD TEST: CPT

## 2018-10-02 PROCEDURE — 85027 COMPLETE CBC AUTOMATED: CPT

## 2018-10-02 PROCEDURE — 80053 COMPREHEN METABOLIC PANEL: CPT

## 2018-10-02 PROCEDURE — 6370000000 HC RX 637 (ALT 250 FOR IP): Performed by: NURSE PRACTITIONER

## 2018-10-02 PROCEDURE — 82248 BILIRUBIN DIRECT: CPT

## 2018-10-02 RX ORDER — TRAMADOL HYDROCHLORIDE 50 MG/1
50 TABLET ORAL EVERY 8 HOURS PRN
Qty: 15 TABLET | Refills: 0 | Status: SHIPPED | OUTPATIENT
Start: 2018-10-02 | End: 2018-10-07

## 2018-10-02 RX ORDER — CYCLOBENZAPRINE HCL 10 MG
10 TABLET ORAL 3 TIMES DAILY PRN
Qty: 15 TABLET | Refills: 0 | Status: SHIPPED | OUTPATIENT
Start: 2018-10-02 | End: 2018-10-07

## 2018-10-02 RX ORDER — ACETAMINOPHEN 325 MG/1
650 TABLET ORAL EVERY 4 HOURS PRN
Status: DISCONTINUED | OUTPATIENT
Start: 2018-10-02 | End: 2018-10-02 | Stop reason: HOSPADM

## 2018-10-02 RX ADMIN — INSULIN LISPRO 1 UNITS: 100 INJECTION, SOLUTION INTRAVENOUS; SUBCUTANEOUS at 08:37

## 2018-10-02 RX ADMIN — GEMFIBROZIL 600 MG: 600 TABLET ORAL at 06:08

## 2018-10-02 RX ADMIN — VITAMIN D, TAB 1000IU (100/BT) 1000 UNITS: 25 TAB at 08:36

## 2018-10-02 RX ADMIN — ENOXAPARIN SODIUM 40 MG: 40 INJECTION SUBCUTANEOUS at 08:37

## 2018-10-02 RX ADMIN — SODIUM CHLORIDE, PRESERVATIVE FREE 10 ML: 5 INJECTION INTRAVENOUS at 08:37

## 2018-10-02 RX ADMIN — FAMOTIDINE 20 MG: 20 TABLET ORAL at 08:36

## 2018-10-02 RX ADMIN — CALCIUM CARBONATE 500 MG: 500 TABLET, CHEWABLE ORAL at 08:36

## 2018-10-02 RX ADMIN — LEVOTHYROXINE SODIUM 150 MCG: 150 TABLET ORAL at 06:08

## 2018-10-02 RX ADMIN — ASPIRIN 325 MG: 325 TABLET ORAL at 08:36

## 2018-10-02 RX ADMIN — OMEGA-3-ACID ETHYL ESTERS 1 G: 1 CAPSULE, LIQUID FILLED ORAL at 08:36

## 2018-10-02 ASSESSMENT — PAIN SCALES - GENERAL
PAINLEVEL_OUTOF10: 0
PAINLEVEL_OUTOF10: 0

## 2018-10-02 NOTE — PROGRESS NOTES
questions answered at this time. Plan:  Gave patient card with pharmacy number and call with any questions.       Marcel Magaña PharmD, [facility] 10/2/2018 at 10:54 AM

## 2018-10-02 NOTE — PROGRESS NOTES
Patient has his own cpap unit at he bedside and denies any needs from Respiratory Care at this time. Patient is on room air and does not require any supplemental 02.

## 2018-10-02 NOTE — DISCHARGE SUMMARY
normocephalic  Ears: TM's bialterall normal with normal canals  Eyes: without pallor or icterus  Oral cavity: moist mucous membranes with normal dentition  Neck: supple with no lymphadenopathy, JVD down, trachea central  CVS: normal S1S2 with no additional heart sounds  Respi: good air entry in both lung fields with no other adventious breath sounds  GI: soft & non tender, with +ve bowel sounds, no guarding ,rigidity or rebound tenderness  : no inguinal lymphadenopathy, no CVA tenderness  CNS: no focal weakness or sensory deficits peripherally, DTR's equal bilaterally, CN2-12 normal  Skin: no rash, purpurea or eruptions  MS: normal muscle strength, normal ROM in all major joints      Procedures:  midline    Significant Diagnostic Studies at discharge:   None significant    Patient Instructions:   Mely Suero   Home Medication Instructions PTK:[id number]    Printed on:10/02/18 0911   Medication Information                      acetaminophen (AMINOFEN) 325 MG tablet  Take 2 tablets by mouth every 6 hours as needed for Pain             aspirin 325 MG tablet  Take 325 mg by mouth daily. calcium carbonate (OSCAL) 500 MG TABS tablet  Take 500 mg by mouth daily             cyclobenzaprine (FLEXERIL) 10 MG tablet  Take 1 tablet by mouth 3 times daily as needed for Muscle spasms             Fish Oil-Cholecalciferol (FISH OIL + D3 PO)  Take by mouth daily              gemfibrozil (LOPID) 600 MG tablet  Take 600 mg by mouth 2 times daily (before meals). glipiZIDE (GLUCOTROL) 5 MG tablet  Take 10 mg by mouth every morning (before breakfast)              levothyroxine (SYNTHROID) 150 MCG tablet  Take 150 mcg by mouth daily             melatonin 3 MG TABS tablet  Take 3 mg by mouth daily             simvastatin (ZOCOR) 40 MG tablet  Take 40 mg by mouth nightly. traMADol (ULTRAM) 50 MG tablet  Take 1 tablet by mouth every 8 hours as needed for Pain for up to 5 days. .             traZODone (DESYREL) 100 MG tablet  Take 100 mg by mouth nightly                  Code Status: Full Code     Consults: general surgery    Diet: diabetic    Activity: as tolerated   Work:    Discharged Condition: good    Prognosis: good    Disposition: home      Follow-up with   1. PCP within   2-3    Days    Follow up labs: basic       Discharge Physician Signed: Rosalia Sawyer M.D. Time spent on discharge in the examination, evaluation, counseling and review of medications and discharge plan: 45 minutes    Electronically signed by Rosalia Sawyer MD on 10/2/2018 at 9:11 AM      Comment: Please note this report has been produced using speech recognition software and may contain errors related to that system including errors in grammar, punctuation, and spelling, as well as words and phrases that may be inappropriate.  If there are any questions or concerns please feel free to contact the dictating provider for clarification

## 2018-10-09 ENCOUNTER — OFFICE VISIT (OUTPATIENT)
Dept: SURGERY | Age: 73
End: 2018-10-09
Payer: MEDICARE

## 2018-10-09 VITALS
SYSTOLIC BLOOD PRESSURE: 112 MMHG | BODY MASS INDEX: 28.66 KG/M2 | HEART RATE: 84 BPM | DIASTOLIC BLOOD PRESSURE: 54 MMHG | RESPIRATION RATE: 20 BRPM | WEIGHT: 183 LBS

## 2018-10-09 DIAGNOSIS — K85.10 GALLSTONE PANCREATITIS: Primary | ICD-10-CM

## 2018-10-09 PROCEDURE — 3017F COLORECTAL CA SCREEN DOC REV: CPT | Performed by: SURGERY

## 2018-10-09 PROCEDURE — 4040F PNEUMOC VAC/ADMIN/RCVD: CPT | Performed by: SURGERY

## 2018-10-09 PROCEDURE — 1123F ACP DISCUSS/DSCN MKR DOCD: CPT | Performed by: SURGERY

## 2018-10-09 PROCEDURE — G8419 CALC BMI OUT NRM PARAM NOF/U: HCPCS | Performed by: SURGERY

## 2018-10-09 PROCEDURE — 99213 OFFICE O/P EST LOW 20 MIN: CPT | Performed by: SURGERY

## 2018-10-09 PROCEDURE — G8482 FLU IMMUNIZE ORDER/ADMIN: HCPCS | Performed by: SURGERY

## 2018-10-09 PROCEDURE — 4004F PT TOBACCO SCREEN RCVD TLK: CPT | Performed by: SURGERY

## 2018-10-09 PROCEDURE — 1111F DSCHRG MED/CURRENT MED MERGE: CPT | Performed by: SURGERY

## 2018-10-09 PROCEDURE — 1101F PT FALLS ASSESS-DOCD LE1/YR: CPT | Performed by: SURGERY

## 2018-10-09 PROCEDURE — G8427 DOCREV CUR MEDS BY ELIG CLIN: HCPCS | Performed by: SURGERY

## 2018-10-09 RX ORDER — FLUTICASONE PROPIONATE 50 MCG
1 SPRAY, SUSPENSION (ML) NASAL DAILY
COMMUNITY

## 2018-10-09 RX ORDER — DIPHENHYDRAMINE HCL 25 MG
25 TABLET ORAL EVERY 6 HOURS PRN
Status: ON HOLD | COMMUNITY
End: 2021-12-08 | Stop reason: HOSPADM

## 2018-10-09 RX ORDER — MULTIVITAMIN WITH IRON
250 TABLET ORAL DAILY
COMMUNITY
End: 2020-02-24

## 2018-10-09 RX ORDER — CITALOPRAM 20 MG/1
TABLET ORAL
COMMUNITY
Start: 2018-07-31 | End: 2019-08-24 | Stop reason: ALTCHOICE

## 2018-10-09 ASSESSMENT — ENCOUNTER SYMPTOMS
COUGH: 0
HEARTBURN: 0
NAUSEA: 0
VOMITING: 0
SPUTUM PRODUCTION: 0
DIARRHEA: 0
ABDOMINAL PAIN: 0

## 2018-10-09 NOTE — PROGRESS NOTES
Department of Verner Reilly, MD   Consult Note      Reason for Consult:  Gallstone pancreatitis    Referring Physician:  Dr. Millicent Cancino:    Chief Complaint   Patient presents with   [address] from Hospital     Idiopathic chronic pancreatitis       History Obtained From:  patient    HISTORY OF PRESENT ILLNESS:                The patient is a 68 y.o. male who presents with gallstone pancreatitis after having chicken salad. He has been asymptomatic since getting out of the hospital. His blood sugar has been harder to control since the pancreatitis. His bowels are back to normal and no diarrhea. US shows shadowing gallstones.     Past Medical History:    Past Medical History:   Diagnosis Date    Arthritis     BPH (benign prostatic hyperplasia)     CPAP (continuous positive airway pressure) dependence     Diabetes mellitus ([address]     dx 2012    Erectile dysfunction     Hyperlipidemia     Sleep apnea     had sleep study done 2015=- uses cpap nightly    Thyroid cancer ([address] 06/2018    Total thyroidectomy     Wears dentures     upper    Wears glasses     \"just to read\"      Past Surgical History:    Past Surgical History:   Procedure Laterality Date    APPENDECTOMY  2006    CARPAL TUNNEL RELEASE Right 2014    CATARACT REMOVAL Bilateral 12/01/2015    COLONOSCOPY      COLONOSCOPY  04/26/2013    EYE SURGERY Right     cataract extraction with implant/ fritz \"eyelid lift- 2015    HERNIA REPAIR Right 1990's    inguinal    ROTATOR CUFF REPAIR Left 2005     Current Medications:   Current Outpatient Prescriptions   Medication Sig Dispense Refill    fluticasone (FLONASE) 50 MCG/ACT nasal spray 1 spray by Each Nare route daily      diphenhydrAMINE (BENADRYL) 25 MG tablet Take 25 mg by mouth every 6 hours as needed for Itching      magnesium (MAGNESIUM-OXIDE) 250 MG TABS tablet Take 250 mg by mouth daily      citalopram (CELEXA) 20 MG tablet       levothyroxine (SYNTHROID) 150 MCG

## 2018-10-11 ENCOUNTER — ANESTHESIA (OUTPATIENT)
Dept: OPERATING ROOM | Age: 73
End: 2018-10-11
Payer: MEDICARE

## 2018-10-11 ENCOUNTER — ANESTHESIA EVENT (OUTPATIENT)
Dept: OPERATING ROOM | Age: 73
End: 2018-10-11
Payer: MEDICARE

## 2018-10-11 ENCOUNTER — HOSPITAL ENCOUNTER (OUTPATIENT)
Age: 73
Setting detail: OUTPATIENT SURGERY
Discharge: HOME | End: 2018-10-11
Attending: SURGERY | Admitting: SURGERY
Payer: MEDICARE

## 2018-10-11 VITALS
OXYGEN SATURATION: 100 % | RESPIRATION RATE: 23 BRPM | SYSTOLIC BLOOD PRESSURE: 144 MMHG | DIASTOLIC BLOOD PRESSURE: 84 MMHG

## 2018-10-11 VITALS
RESPIRATION RATE: 16 BRPM | SYSTOLIC BLOOD PRESSURE: 113 MMHG | HEART RATE: 76 BPM | DIASTOLIC BLOOD PRESSURE: 64 MMHG | BODY MASS INDEX: 28.72 KG/M2 | WEIGHT: 183 LBS | TEMPERATURE: 97 F | HEIGHT: 67 IN | OXYGEN SATURATION: 97 %

## 2018-10-11 DIAGNOSIS — K85.10 GALLSTONE PANCREATITIS: Primary | ICD-10-CM

## 2018-10-11 LAB — GLUCOSE BLD-MCNC: 134 MG/DL (ref 70–99)

## 2018-10-11 PROCEDURE — C1773 RET DEV, INSERTABLE: HCPCS | Performed by: SURGERY

## 2018-10-11 PROCEDURE — 7100000011 HC PHASE II RECOVERY - ADDTL 15 MIN: Performed by: SURGERY

## 2018-10-11 PROCEDURE — 2500000003 HC RX 250 WO HCPCS: Performed by: NURSE ANESTHETIST, CERTIFIED REGISTERED

## 2018-10-11 PROCEDURE — 88304 TISSUE EXAM BY PATHOLOGIST: CPT

## 2018-10-11 PROCEDURE — 6360000002 HC RX W HCPCS: Performed by: NURSE ANESTHETIST, CERTIFIED REGISTERED

## 2018-10-11 PROCEDURE — 3600000004 HC SURGERY LEVEL 4 BASE: Performed by: SURGERY

## 2018-10-11 PROCEDURE — 2500000003 HC RX 250 WO HCPCS: Performed by: SURGERY

## 2018-10-11 PROCEDURE — 7100000000 HC PACU RECOVERY - FIRST 15 MIN: Performed by: SURGERY

## 2018-10-11 PROCEDURE — 2709999900 HC NON-CHARGEABLE SUPPLY: Performed by: SURGERY

## 2018-10-11 PROCEDURE — 47562 LAPAROSCOPIC CHOLECYSTECTOMY: CPT | Performed by: SURGERY

## 2018-10-11 PROCEDURE — 3600000014 HC SURGERY LEVEL 4 ADDTL 15MIN: Performed by: SURGERY

## 2018-10-11 PROCEDURE — 82962 GLUCOSE BLOOD TEST: CPT

## 2018-10-11 PROCEDURE — 7100000001 HC PACU RECOVERY - ADDTL 15 MIN: Performed by: SURGERY

## 2018-10-11 PROCEDURE — 3700000001 HC ADD 15 MINUTES (ANESTHESIA): Performed by: SURGERY

## 2018-10-11 PROCEDURE — 3700000000 HC ANESTHESIA ATTENDED CARE: Performed by: SURGERY

## 2018-10-11 PROCEDURE — 7100000010 HC PHASE II RECOVERY - FIRST 15 MIN: Performed by: SURGERY

## 2018-10-11 RX ORDER — SODIUM CHLORIDE 9 MG/ML
INJECTION, SOLUTION INTRAVENOUS CONTINUOUS
Status: DISCONTINUED | OUTPATIENT
Start: 2018-10-11 | End: 2018-10-11 | Stop reason: HOSPADM

## 2018-10-11 RX ORDER — HYDRALAZINE HYDROCHLORIDE 20 MG/ML
5 INJECTION INTRAMUSCULAR; INTRAVENOUS EVERY 10 MIN PRN
Status: DISCONTINUED | OUTPATIENT
Start: 2018-10-11 | End: 2018-10-11 | Stop reason: HOSPADM

## 2018-10-11 RX ORDER — PROPOFOL 10 MG/ML
INJECTION, EMULSION INTRAVENOUS PRN
Status: DISCONTINUED | OUTPATIENT
Start: 2018-10-11 | End: 2018-10-11 | Stop reason: SDUPTHER

## 2018-10-11 RX ORDER — LIDOCAINE HYDROCHLORIDE 20 MG/ML
INJECTION, SOLUTION EPIDURAL; INFILTRATION; INTRACAUDAL; PERINEURAL PRN
Status: DISCONTINUED | OUTPATIENT
Start: 2018-10-11 | End: 2018-10-11 | Stop reason: SDUPTHER

## 2018-10-11 RX ORDER — FENTANYL CITRATE 50 UG/ML
INJECTION, SOLUTION INTRAMUSCULAR; INTRAVENOUS PRN
Status: DISCONTINUED | OUTPATIENT
Start: 2018-10-11 | End: 2018-10-11 | Stop reason: SDUPTHER

## 2018-10-11 RX ORDER — LABETALOL HYDROCHLORIDE 5 MG/ML
5 INJECTION, SOLUTION INTRAVENOUS EVERY 10 MIN PRN
Status: DISCONTINUED | OUTPATIENT
Start: 2018-10-11 | End: 2018-10-11 | Stop reason: HOSPADM

## 2018-10-11 RX ORDER — HYDROCODONE BITARTRATE AND ACETAMINOPHEN 5; 325 MG/1; MG/1
1 TABLET ORAL EVERY 4 HOURS PRN
Qty: 28 TABLET | Refills: 0 | Status: SHIPPED | OUTPATIENT
Start: 2018-10-11 | End: 2018-10-18

## 2018-10-11 RX ORDER — DEXAMETHASONE SODIUM PHOSPHATE 4 MG/ML
INJECTION, SOLUTION INTRA-ARTICULAR; INTRALESIONAL; INTRAMUSCULAR; INTRAVENOUS; SOFT TISSUE PRN
Status: DISCONTINUED | OUTPATIENT
Start: 2018-10-11 | End: 2018-10-11 | Stop reason: SDUPTHER

## 2018-10-11 RX ORDER — ROCURONIUM BROMIDE 10 MG/ML
INJECTION, SOLUTION INTRAVENOUS PRN
Status: DISCONTINUED | OUTPATIENT
Start: 2018-10-11 | End: 2018-10-11 | Stop reason: SDUPTHER

## 2018-10-11 RX ORDER — HYDROMORPHONE HCL 110MG/55ML
PATIENT CONTROLLED ANALGESIA SYRINGE INTRAVENOUS PRN
Status: DISCONTINUED | OUTPATIENT
Start: 2018-10-11 | End: 2018-10-11 | Stop reason: SDUPTHER

## 2018-10-11 RX ORDER — EPHEDRINE SULFATE 50 MG/ML
INJECTION, SOLUTION INTRAVENOUS PRN
Status: DISCONTINUED | OUTPATIENT
Start: 2018-10-11 | End: 2018-10-11 | Stop reason: SDUPTHER

## 2018-10-11 RX ORDER — FENTANYL CITRATE 50 UG/ML
50 INJECTION, SOLUTION INTRAMUSCULAR; INTRAVENOUS EVERY 5 MIN PRN
Status: DISCONTINUED | OUTPATIENT
Start: 2018-10-11 | End: 2018-10-11 | Stop reason: HOSPADM

## 2018-10-11 RX ORDER — ACETAMINOPHEN 10 MG/ML
1000 INJECTION, SOLUTION INTRAVENOUS ONCE
Status: COMPLETED | OUTPATIENT
Start: 2018-10-11 | End: 2018-10-11

## 2018-10-11 RX ORDER — KETOROLAC TROMETHAMINE 30 MG/ML
INJECTION, SOLUTION INTRAMUSCULAR; INTRAVENOUS PRN
Status: DISCONTINUED | OUTPATIENT
Start: 2018-10-11 | End: 2018-10-11 | Stop reason: SDUPTHER

## 2018-10-11 RX ORDER — FENTANYL CITRATE 50 UG/ML
25 INJECTION, SOLUTION INTRAMUSCULAR; INTRAVENOUS EVERY 5 MIN PRN
Status: DISCONTINUED | OUTPATIENT
Start: 2018-10-11 | End: 2018-10-11 | Stop reason: HOSPADM

## 2018-10-11 RX ORDER — BUPIVACAINE HYDROCHLORIDE 5 MG/ML
INJECTION, SOLUTION PERINEURAL
Status: COMPLETED | OUTPATIENT
Start: 2018-10-11 | End: 2018-10-11

## 2018-10-11 RX ORDER — ONDANSETRON 2 MG/ML
INJECTION INTRAMUSCULAR; INTRAVENOUS PRN
Status: DISCONTINUED | OUTPATIENT
Start: 2018-10-11 | End: 2018-10-11 | Stop reason: SDUPTHER

## 2018-10-11 RX ADMIN — PROPOFOL 150 MG: 10 INJECTION, EMULSION INTRAVENOUS at 07:40

## 2018-10-11 RX ADMIN — KETOROLAC TROMETHAMINE 30 MG: 30 INJECTION, SOLUTION INTRAMUSCULAR at 08:15

## 2018-10-11 RX ADMIN — ONDANSETRON HYDROCHLORIDE 4 MG: 4 INJECTION, SOLUTION INTRAMUSCULAR; INTRAVENOUS at 07:45

## 2018-10-11 RX ADMIN — LIDOCAINE HYDROCHLORIDE 50 MG: 20 INJECTION, SOLUTION EPIDURAL; INFILTRATION; INTRACAUDAL; PERINEURAL at 07:40

## 2018-10-11 RX ADMIN — ROCURONIUM BROMIDE 40 MG: 10 INJECTION, SOLUTION INTRAVENOUS at 07:40

## 2018-10-11 RX ADMIN — HYDROMORPHONE HYDROCHLORIDE 0.5 MG: 2 INJECTION INTRAMUSCULAR; INTRAVENOUS; SUBCUTANEOUS at 08:05

## 2018-10-11 RX ADMIN — EPHEDRINE SULFATE 10 MG: 50 INJECTION INTRAMUSCULAR; INTRAVENOUS; SUBCUTANEOUS at 07:40

## 2018-10-11 RX ADMIN — DEXAMETHASONE SODIUM PHOSPHATE 8 MG: 4 INJECTION, SOLUTION INTRAMUSCULAR; INTRAVENOUS at 07:45

## 2018-10-11 RX ADMIN — ACETAMINOPHEN 1000 MG: 10 INJECTION, SOLUTION INTRAVENOUS at 08:04

## 2018-10-11 RX ADMIN — FENTANYL CITRATE 100 MCG: 50 INJECTION INTRAMUSCULAR; INTRAVENOUS at 07:35

## 2018-10-11 RX ADMIN — EPHEDRINE SULFATE 10 MG: 50 INJECTION INTRAMUSCULAR; INTRAVENOUS; SUBCUTANEOUS at 07:50

## 2018-10-11 ASSESSMENT — PULMONARY FUNCTION TESTS
PIF_VALUE: 14
PIF_VALUE: 24
PIF_VALUE: 15
PIF_VALUE: 14
PIF_VALUE: 23
PIF_VALUE: 1
PIF_VALUE: 15
PIF_VALUE: 36
PIF_VALUE: 4
PIF_VALUE: 24
PIF_VALUE: 18
PIF_VALUE: 16
PIF_VALUE: 14
PIF_VALUE: 16
PIF_VALUE: 21
PIF_VALUE: 1
PIF_VALUE: 15
PIF_VALUE: 16
PIF_VALUE: 13
PIF_VALUE: 1
PIF_VALUE: 23
PIF_VALUE: 20
PIF_VALUE: 15
PIF_VALUE: 14
PIF_VALUE: 14
PIF_VALUE: 0
PIF_VALUE: 0
PIF_VALUE: 14
PIF_VALUE: 14
PIF_VALUE: 24
PIF_VALUE: 24
PIF_VALUE: 15
PIF_VALUE: 7
PIF_VALUE: 34
PIF_VALUE: 16
PIF_VALUE: 15
PIF_VALUE: 8
PIF_VALUE: 17
PIF_VALUE: 39
PIF_VALUE: 23
PIF_VALUE: 14
PIF_VALUE: 24
PIF_VALUE: 15
PIF_VALUE: 0

## 2018-10-11 ASSESSMENT — PAIN DESCRIPTION - PAIN TYPE: TYPE: SURGICAL PAIN

## 2018-10-11 ASSESSMENT — PAIN SCALES - GENERAL
PAINLEVEL_OUTOF10: 0
PAINLEVEL_OUTOF10: 3
PAINLEVEL_OUTOF10: 0

## 2018-10-11 ASSESSMENT — PAIN DESCRIPTION - LOCATION: LOCATION: ABDOMEN

## 2018-10-11 ASSESSMENT — PAIN DESCRIPTION - DESCRIPTORS: DESCRIPTORS: ACHING;BURNING;DISCOMFORT

## 2018-10-11 ASSESSMENT — PAIN - FUNCTIONAL ASSESSMENT: PAIN_FUNCTIONAL_ASSESSMENT: 0-10

## 2018-10-11 NOTE — ANESTHESIA PRE PROCEDURE
DiabetesType II DM, well controlled, , .                 Abdominal:           Vascular:                                        Anesthesia Plan      general     ASA 2       Induction: intravenous. Anesthetic plan and risks discussed with patient. Use of blood products discussed with patient whom.                    NILSON Ventura CRNA   10/11/2018

## 2018-10-11 NOTE — H&P
CMP:    Lab Results   Component Value Date     10/02/2018    K 4.4 10/02/2018    CL 97 10/02/2018    CO2 25 10/02/2018    BUN 8 10/02/2018    CREATININE 0.8 10/02/2018    GFRAA >60 10/02/2018    LABGLOM >60 10/02/2018    GLUCOSE 164 10/02/2018    PROT 6.9 10/02/2018    LABALBU 4.1 10/02/2018    CALCIUM 8.8 10/02/2018    BILITOT 0.6 10/02/2018    ALKPHOS 79 10/02/2018    AST 35 10/02/2018    ALT 35 10/02/2018       ASSESSMENT AND PLAN: Will plan for lap mathieu under gener anesthesia. The risks, benefits and alternatives to the planned procedure were discussed. Patient expressed an understanding and is willing to proceed.     Eris Duff, APRN-CNP

## 2018-10-23 ENCOUNTER — OFFICE VISIT (OUTPATIENT)
Dept: SURGERY | Age: 73
End: 2018-10-23

## 2018-10-23 VITALS
RESPIRATION RATE: 16 BRPM | OXYGEN SATURATION: 94 % | DIASTOLIC BLOOD PRESSURE: 66 MMHG | SYSTOLIC BLOOD PRESSURE: 114 MMHG | HEART RATE: 82 BPM

## 2018-10-23 DIAGNOSIS — Z09 POSTOP CHECK: ICD-10-CM

## 2018-10-23 DIAGNOSIS — K85.10 GALLSTONE PANCREATITIS: Primary | ICD-10-CM

## 2018-10-23 PROCEDURE — 99024 POSTOP FOLLOW-UP VISIT: CPT | Performed by: NURSE PRACTITIONER

## 2018-10-23 NOTE — PROGRESS NOTES
Blane Daily is 2 weeks post-op: fredis murdock. Presenting for routine follow-up. S:  Doing well. Patient has noted no excessive redness, swelling, pain and discharge. O:  Wound healing well. A:  Satisfactory course. P: Staples removed today. Patient to return in 2 weeks. Patient is to return as needed for redness, swelling, discomfort, or any concern about his surgery.

## 2018-11-06 ENCOUNTER — OFFICE VISIT (OUTPATIENT)
Dept: SURGERY | Age: 73
End: 2018-11-06

## 2018-11-06 VITALS — HEART RATE: 78 BPM | RESPIRATION RATE: 14 BRPM | SYSTOLIC BLOOD PRESSURE: 136 MMHG | DIASTOLIC BLOOD PRESSURE: 82 MMHG

## 2018-11-06 DIAGNOSIS — Z09 POSTOP CHECK: Primary | ICD-10-CM

## 2018-11-06 PROCEDURE — 99024 POSTOP FOLLOW-UP VISIT: CPT | Performed by: NURSE PRACTITIONER

## 2018-11-06 NOTE — PROGRESS NOTES
[place] has a pain level on 0/10 scale  0    Location abdomen    Description no pain    Radiation   No    Duration  2 week(s)    Time  none

## 2018-12-04 ENCOUNTER — OFFICE VISIT (OUTPATIENT)
Dept: SURGERY | Age: 73
End: 2018-12-04

## 2018-12-04 VITALS — OXYGEN SATURATION: 98 % | DIASTOLIC BLOOD PRESSURE: 70 MMHG | HEART RATE: 78 BPM | SYSTOLIC BLOOD PRESSURE: 124 MMHG

## 2018-12-04 DIAGNOSIS — Z09 POSTOP CHECK: Primary | ICD-10-CM

## 2018-12-04 PROCEDURE — 99024 POSTOP FOLLOW-UP VISIT: CPT | Performed by: NURSE PRACTITIONER

## 2019-08-24 ENCOUNTER — APPOINTMENT (OUTPATIENT)
Dept: CT IMAGING | Age: 74
End: 2019-08-24
Payer: MEDICARE

## 2019-08-24 ENCOUNTER — HOSPITAL ENCOUNTER (EMERGENCY)
Age: 74
Discharge: HOME | End: 2019-08-24
Attending: EMERGENCY MEDICINE
Payer: MEDICARE

## 2019-08-24 VITALS
BODY MASS INDEX: 30.61 KG/M2 | HEART RATE: 78 BPM | WEIGHT: 195 LBS | TEMPERATURE: 98.1 F | RESPIRATION RATE: 14 BRPM | HEIGHT: 67 IN | SYSTOLIC BLOOD PRESSURE: 125 MMHG | OXYGEN SATURATION: 96 % | DIASTOLIC BLOOD PRESSURE: 75 MMHG

## 2019-08-24 DIAGNOSIS — R31.9 HEMATURIA, UNSPECIFIED TYPE: Primary | ICD-10-CM

## 2019-08-24 LAB
ALBUMIN SERPL-MCNC: 4.5 GM/DL (ref 3.4–5)
ALP BLD-CCNC: 59 IU/L (ref 40–129)
ALT SERPL-CCNC: 7 U/L (ref 10–40)
ANION GAP SERPL CALCULATED.3IONS-SCNC: 12 MMOL/L (ref 4–16)
AST SERPL-CCNC: 27 IU/L (ref 15–37)
BACTERIA: ABNORMAL /HPF
BASOPHILS ABSOLUTE: 0 K/CU MM
BASOPHILS RELATIVE PERCENT: 0.2 % (ref 0–1)
BILIRUB SERPL-MCNC: 0.4 MG/DL (ref 0–1)
BILIRUBIN URINE: NEGATIVE MG/DL
BLOOD, URINE: NEGATIVE
BUN BLDV-MCNC: 16 MG/DL (ref 6–23)
CALCIUM SERPL-MCNC: 9.1 MG/DL (ref 8.3–10.6)
CAST TYPE: ABNORMAL /HPF
CHLORIDE BLD-SCNC: 98 MMOL/L (ref 99–110)
CLARITY: CLEAR
CO2: 29 MMOL/L (ref 21–32)
COLOR: YELLOW
CREAT SERPL-MCNC: 1 MG/DL (ref 0.9–1.3)
CRYSTAL TYPE: ABNORMAL /HPF
DIFFERENTIAL TYPE: ABNORMAL
EOSINOPHILS ABSOLUTE: 0.4 K/CU MM
EOSINOPHILS RELATIVE PERCENT: 7 % (ref 0–3)
EPITHELIAL CELLS, UA: ABNORMAL /HPF
GFR AFRICAN AMERICAN: >60 ML/MIN/1.73M2
GFR NON-AFRICAN AMERICAN: >60 ML/MIN/1.73M2
GLUCOSE BLD-MCNC: 184 MG/DL (ref 70–99)
GLUCOSE, URINE: NEGATIVE MG/DL
HCT VFR BLD CALC: 43.5 % (ref 42–52)
HEMOGLOBIN: 14.6 GM/DL (ref 13.5–18)
IMMATURE NEUTROPHIL %: 0.4 % (ref 0–0.43)
KETONES, URINE: NEGATIVE MG/DL
LEUKOCYTE ESTERASE, URINE: NEGATIVE
LYMPHOCYTES ABSOLUTE: 1.3 K/CU MM
LYMPHOCYTES RELATIVE PERCENT: 23.5 % (ref 24–44)
MCH RBC QN AUTO: 29.6 PG (ref 27–31)
MCHC RBC AUTO-ENTMCNC: 33.6 % (ref 32–36)
MCV RBC AUTO: 88.2 FL (ref 78–100)
MONOCYTES ABSOLUTE: 0.5 K/CU MM
MONOCYTES RELATIVE PERCENT: 8.3 % (ref 0–4)
MUCUS: NEGATIVE HPF
NITRITE URINE, QUANTITATIVE: NEGATIVE
PDW BLD-RTO: 12.1 % (ref 11.7–14.9)
PH, URINE: 7 (ref 5–8)
PLATELET # BLD: 237 K/CU MM (ref 140–440)
PMV BLD AUTO: 9.3 FL (ref 7.5–11.1)
POTASSIUM SERPL-SCNC: 4.3 MMOL/L (ref 3.5–5.1)
PROTEIN UA: NEGATIVE MG/DL
RBC # BLD: 4.93 M/CU MM (ref 4.6–6.2)
RBC URINE: ABNORMAL /HPF (ref 0–3)
SEGMENTED NEUTROPHILS ABSOLUTE COUNT: 3.4 K/CU MM
SEGMENTED NEUTROPHILS RELATIVE PERCENT: 60.6 % (ref 36–66)
SODIUM BLD-SCNC: 139 MMOL/L (ref 135–145)
SPECIFIC GRAVITY UA: 1.01 (ref 1–1.03)
TOTAL IMMATURE NEUTOROPHIL: 0.02 K/CU MM
TOTAL PROTEIN: 7.4 GM/DL (ref 6.4–8.2)
UROBILINOGEN, URINE: 0.2 MG/DL (ref 0.2–1)
VOLUME, (UVOL): 12 ML (ref 10–12)
WBC # BLD: 5.6 K/CU MM (ref 4–10.5)
WBC UA: ABNORMAL /HPF (ref 0–2)

## 2019-08-24 PROCEDURE — 80053 COMPREHEN METABOLIC PANEL: CPT

## 2019-08-24 PROCEDURE — 85025 COMPLETE CBC W/AUTO DIFF WBC: CPT

## 2019-08-24 PROCEDURE — 87086 URINE CULTURE/COLONY COUNT: CPT

## 2019-08-24 PROCEDURE — 81001 URINALYSIS AUTO W/SCOPE: CPT

## 2019-08-24 PROCEDURE — 74176 CT ABD & PELVIS W/O CONTRAST: CPT

## 2019-08-24 PROCEDURE — 99284 EMERGENCY DEPT VISIT MOD MDM: CPT

## 2019-08-24 RX ORDER — TRAMADOL HYDROCHLORIDE 50 MG/1
50 TABLET ORAL 2 TIMES DAILY
COMMUNITY
End: 2020-02-19

## 2019-08-24 RX ORDER — ESCITALOPRAM OXALATE 5 MG/1
5 TABLET ORAL DAILY
COMMUNITY
End: 2020-02-19

## 2019-08-24 RX ORDER — TAMSULOSIN HYDROCHLORIDE 0.4 MG/1
0.4 CAPSULE ORAL NIGHTLY
COMMUNITY
End: 2020-02-24 | Stop reason: ALTCHOICE

## 2019-08-24 RX ORDER — CHLORAL HYDRATE 500 MG
CAPSULE ORAL
COMMUNITY

## 2019-08-24 ASSESSMENT — ENCOUNTER SYMPTOMS
RESPIRATORY NEGATIVE: 1
ALLERGIC/IMMUNOLOGIC NEGATIVE: 1
EYES NEGATIVE: 1
GASTROINTESTINAL NEGATIVE: 1

## 2019-08-24 NOTE — ED NOTES
After the patient was discharged the wife returned asking me to change the discharge papers. The papers say the reason for the visit was for rectal bleeding. She reports that is not correct. I told her I could not change another nurses documentation.       Tarsha Bhagat, AICHA  08/24/19 8460

## 2019-08-24 NOTE — ED PROVIDER NOTES
[address] [facility] CHIEF COMPLAINT:   Rectal Bleeding (States for the past 2 days has noticed there is red spots on his attends. States has bright red blood in his urine. Denies urinary urgency, frequency, or pain. States takes an ASA daily. Denies nausea or vomiting. States has occasional loose bowels. No further complaint voiced. )      [place]:  Senait Downing is a 76 y.o. male that presents with complaint hematuria. Patient denies any rectal pain or bleeding to me. Patient states he noticed bright red blood in his urine today. He has no pain whatsoever. He takes aspirin. He does have a history of a large prostate. Denies any fevers nausea vomiting chest pain shortness of breath abdominal pain back pain flank pain no trauma. No night sweats or weight loss. No history of bladder cancer. He again has no pain whatsoever no history of kidney stones or AAA. He just noticed some bright red blood in his urine came here for evaluation. He does follow with urology in [place]. REVIEW OF SYSTEMS:  At least 10 systems reviewed and otherwise acutely negative except as in the [address] Review of Systems   Constitutional: Negative. HENT: Negative. Eyes: Negative. Respiratory: Negative. Cardiovascular: Negative. Gastrointestinal: Negative. Endocrine: Negative. Genitourinary: Positive for hematuria. Musculoskeletal: Negative. Skin: Negative. Allergic/Immunologic: Negative. Neurological: Negative. Hematological: Negative. Psychiatric/Behavioral: Negative. All other systems reviewed and are negative.       Past Medical History:   Diagnosis Date    Arthritis     BPH (benign prostatic hyperplasia)     CPAP (continuous positive airway pressure) dependence     Diabetes mellitus ([address]     dx 2012    Erectile dysfunction     Hyperlipidemia     Sleep apnea     had sleep study done 2015=- uses cpap nightly    Thyroid cancer ([address] 06/2018    Total round, and reactive to light. Conjunctivae and EOM are normal. Right eye exhibits no discharge. Left eye exhibits no discharge. No scleral icterus. Neck: Normal range of motion, full passive range of motion without pain and phonation normal. No JVD present. No neck rigidity. No edema, no erythema and normal range of motion present. Cardiovascular: Normal rate, regular rhythm, normal heart sounds and intact distal pulses. Exam reveals no gallop and no friction rub. No murmur heard. Pulmonary/Chest: Effort normal and breath sounds normal. No stridor. No respiratory distress. He has no wheezes. He has no rales. Abdominal: Soft. Bowel sounds are normal. He exhibits no distension and no mass. There is no tenderness. There is no rigidity, no rebound, no guarding, no tenderness at McBurney's point and negative Mckoy's sign. Musculoskeletal: Normal range of motion. He exhibits no edema, tenderness or deformity. Neurological: He is alert and oriented to person, place, and time. He has normal strength. He is not disoriented. He displays no atrophy and no tremor. No cranial nerve deficit or sensory deficit. He exhibits normal muscle tone. He displays no seizure activity. Coordination normal. GCS eye subscore is 4. GCS verbal subscore is 5. GCS motor subscore is 6. Skin: Skin is warm. No rash noted. He is not diaphoretic. No erythema. No pallor. Psychiatric: He has a normal mood and affect. His behavior is normal. Judgment and thought content normal.   Nursing note and vitals reviewed.         I have reviewed andinterpreted all of the currently available lab results from this visit (if applicable):    Results for orders placed or performed during the hospital encounter of 08/24/19   CBC Auto Differential   Result Value Ref Range    WBC 5.6 4.0 - 10.5 K/CU MM    RBC 4.93 4.6 - 6.2 M/CU MM    Hemoglobin 14.6 13.5 - 18.0 GM/DL    Hematocrit 43.5 42 - 52 %    MCV 88.2 78 - 100 FL    MCH 29.6 27 - 31 PG    MCHC 33.6 32.0 - 36.0 %    RDW 12.1 11.7 - 14.9 %    Platelets 954 825 - 535 K/CU MM    MPV 9.3 7.5 - 11.1 FL    Differential Type AUTOMATED DIFFERENTIAL     Segs Relative 60.6 36 - 66 %    Lymphocytes % 23.5 (L) 24 - 44 %    Monocytes % 8.3 (H) 0 - 4 %    Eosinophils % 7.0 (H) 0 - 3 %    Basophils % 0.2 0 - 1 %    Segs Absolute 3.4 K/CU MM    Lymphocytes Absolute 1.3 K/CU MM    Monocytes Absolute 0.5 K/CU MM    Eosinophils Absolute 0.4 K/CU MM    Basophils Absolute 0.0 K/CU MM    Immature Neutrophil % 0.4 0 - 0.43 %    Total Immature Neutrophil 0.02 K/CU MM   CMP   Result Value Ref Range    Sodium 139 135 - 145 MMOL/L    Potassium 4.3 3.5 - 5.1 MMOL/L    Chloride 98 (L) 99 - 110 mMol/L    CO2 29 21 - 32 MMOL/L    BUN 16 6 - 23 MG/DL    CREATININE 1.0 0.9 - 1.3 MG/DL    Glucose 184 (H) 70 - 99 MG/DL    Calcium 9.1 8.3 - 10.6 MG/DL    Alb 4.5 3.4 - 5.0 GM/DL    Total Protein 7.4 6.4 - 8.2 GM/DL    Total Bilirubin 0.4 0.0 - 1.0 MG/DL    ALT 7 (L) 10 - 40 U/L    AST 27 15 - 37 IU/L    Alkaline Phosphatase 59 40 - 129 IU/L    GFR Non-African American >60 >60 mL/min/1.73m2    GFR African American >60 >60 mL/min/1.73m2    Anion Gap 12 4 - 16   Urinalysis (Lab)   Result Value Ref Range    Color, UA YELLOW YELLOW    Clarity, UA CLEAR CLEAR    Glucose, Urine NEGATIVE NEGATIVE MG/DL    Bilirubin Urine NEGATIVE NEGATIVE MG/DL    Ketones, Urine NEGATIVE NEGATIVE MG/DL    Specific Gravity, UA 1.010 1.001 - 1.035    Blood, Urine NEGATIVE NEGATIVE    pH, Urine 7.0 5.0 - 8.0    Protein, UA NEGATIVE NEGATIVE MG/DL    Urobilinogen, Urine 0.2 0.2 - 1.0 MG/DL    Nitrite Urine, Quantitative NEGATIVE NEGATIVE    Leukocyte Esterase, Urine NEGATIVE NEGATIVE    Volume, (UVOL) 12 10 - 12 ML    RBC, UA NO CELLS SEEN 0 - 3 /HPF    WBC, UA 0 TO 1 0 - 2 /HPF    Epi Cells 0 TO 1  SQUAMOUS   /HPF    Cast Type NO CAST FORMS SEEN NO CAST FORMS SEEN /HPF    Bacteria, UA RARE (A) NEGATIVE /HPF    Crystal Type NONE SEEN NEGATIVE /HPF    Mucus, UA NEGATIVE

## 2019-08-26 LAB
CULTURE: NORMAL
Lab: NORMAL
SPECIMEN: NORMAL

## 2020-02-08 ENCOUNTER — APPOINTMENT (OUTPATIENT)
Dept: GENERAL RADIOLOGY | Age: 75
End: 2020-02-08
Payer: MEDICARE

## 2020-02-08 ENCOUNTER — HOSPITAL ENCOUNTER (EMERGENCY)
Age: 75
Discharge: HOME | End: 2020-02-08
Attending: EMERGENCY MEDICINE
Payer: MEDICARE

## 2020-02-08 VITALS
BODY MASS INDEX: 30.29 KG/M2 | OXYGEN SATURATION: 96 % | WEIGHT: 193 LBS | HEIGHT: 67 IN | SYSTOLIC BLOOD PRESSURE: 168 MMHG | HEART RATE: 76 BPM | TEMPERATURE: 99.2 F | DIASTOLIC BLOOD PRESSURE: 77 MMHG | RESPIRATION RATE: 18 BRPM

## 2020-02-08 PROCEDURE — 73501 X-RAY EXAM HIP UNI 1 VIEW: CPT

## 2020-02-08 PROCEDURE — 73560 X-RAY EXAM OF KNEE 1 OR 2: CPT

## 2020-02-08 PROCEDURE — 99283 EMERGENCY DEPT VISIT LOW MDM: CPT

## 2020-02-08 ASSESSMENT — PAIN SCALES - GENERAL: PAINLEVEL_OUTOF10: 10

## 2020-02-08 ASSESSMENT — ENCOUNTER SYMPTOMS
BACK PAIN: 0
TROUBLE SWALLOWING: 0
SHORTNESS OF BREATH: 0
WHEEZING: 0
EYE PAIN: 0
ABDOMINAL PAIN: 0
NAUSEA: 0
VOICE CHANGE: 0
VOMITING: 0
STRIDOR: 0

## 2020-02-08 ASSESSMENT — PAIN DESCRIPTION - LOCATION: LOCATION: KNEE

## 2020-02-08 ASSESSMENT — PAIN DESCRIPTION - PAIN TYPE: TYPE: ACUTE PAIN

## 2020-02-08 ASSESSMENT — PAIN DESCRIPTION - DESCRIPTORS: DESCRIPTORS: SHARP

## 2020-02-08 ASSESSMENT — PAIN DESCRIPTION - FREQUENCY: FREQUENCY: CONTINUOUS

## 2020-02-08 ASSESSMENT — PAIN DESCRIPTION - ORIENTATION: ORIENTATION: LEFT

## 2020-02-08 NOTE — ED TRIAGE NOTES
Arrived per w/c to room 6 for triage. Tolerated without difficulty. Bed in lowest position. Call light given.  Gowned for exam.

## 2020-02-08 NOTE — ED PROVIDER NOTES
[address]      Pt Name: Abel Castro  MRN: [id number]  [place] 1945  Date of evaluation: 2/8/2020  Provider: Donta Yu MD    CHIEF COMPLAINT       Chief Complaint   Patient presents with    Fall     States fell about 90 minutes ago.  fell outside going to get the mail. States felt like the lower part of his leg was going to fall off.  is unable to walk on it. Was using ice at home. States is feeling swollen.  Knee Injury         HISTORY OF PRESENT ILLNESS   (Location/Symptom, Timing/Onset, Context/Setting, Quality, Duration, Modifying Factors, Severity)  Note limiting factors. Abel Castro is a 76 y.o. male who presents to the emergency department      35-year-old man says that he was in his usual state of health until this afternoon when he said he was walking outside to get his mail. He said he slipped mechanically from ground-level and landed on his left side mostly on his left knee. Patient says that he thought he heard a pop. He said he is not able to bear weight and complains of pain in his left anterior knee. He denies any other injury no head injury no loss of consciousness no focal weakness or numbness no amnesia no vision changes. He describes the pain in his knee as ache like nonradiating. He says that movement seems to make it worse and rest seems to make it better. He has not attempted other specific treatment. He describes his symptoms as constant and stable sudden onset. He says they are mild to moderate in intensity. The history is provided by the patient. Nursing Notes were reviewed. REVIEW OF SYSTEMS    (2-9 systems for level 4, 10 or more for level 5)     Review of Systems   Constitutional: Negative for fatigue, fever and unexpected weight change. HENT: Negative for trouble swallowing and voice change. Eyes: Negative for pain and visual disturbance.    Respiratory: Negative for shortness of breath, wheezing and stridor. Cardiovascular: Negative for chest pain, palpitations and leg swelling. Gastrointestinal: Negative for abdominal pain, nausea and vomiting. Endocrine: Negative for cold intolerance and heat intolerance. Genitourinary: Negative for decreased urine volume, dysuria and flank pain. Musculoskeletal: Positive for arthralgias, gait problem and joint swelling. Negative for back pain, myalgias, neck pain and neck stiffness. Skin: Negative for rash and wound. Allergic/Immunologic: Negative for immunocompromised state. Neurological: Negative for dizziness, tremors, seizures, syncope, facial asymmetry, speech difficulty, weakness, light-headedness, numbness and headaches. Hematological: Negative for adenopathy. Does not bruise/bleed easily. Psychiatric/Behavioral: Negative for self-injury and suicidal ideas. Except as noted above the remainder of the review of systems was reviewed and negative.        PAST MEDICAL HISTORY     Past Medical History:   Diagnosis Date    Arthritis     BPH (benign prostatic hyperplasia)     CPAP (continuous positive airway pressure) dependence     Diabetes mellitus ([facility]     dx 2012    Erectile dysfunction     Hyperlipidemia     Sleep apnea     had sleep study done 2015=- uses cpap nightly    Thyroid cancer ([facility] 06/2018    Total thyroidectomy     Wears dentures     upper    Wears glasses     \"just to read\"         SURGICAL HISTORY       Past Surgical History:   Procedure Laterality Date    APPENDECTOMY  2006    CARPAL TUNNEL RELEASE Right 2014    CATARACT REMOVAL Bilateral 12/01/2015    CHOLECYSTECTOMY  10/11/2018    COLONOSCOPY      COLONOSCOPY  04/26/2013    EYE SURGERY Right     cataract extraction with implant/ fritz \"eyelid lift- 2015    HERNIA REPAIR Right 1990's    inguinal    OK LAP,CHOLECYSTECTOMY N/A 10/11/2018    CHOLECYSTECTOMY LAPAROSCOPIC performed by Carla Friedman MD at [facility] no step-off. Patient ranges neck 45 degrees any distraction without any discomfort. Right Ear: External ear normal.      Left Ear: External ear normal.      Nose: Nose normal.      Mouth/Throat:      Pharynx: No oropharyngeal exudate. Eyes:      General: No scleral icterus. Right eye: No discharge. Left eye: No discharge. Extraocular Movements: Extraocular movements intact. Conjunctiva/sclera: Conjunctivae normal.      Pupils: Pupils are equal, round, and reactive to light. Neck:      Musculoskeletal: Normal range of motion and neck supple. No neck rigidity or muscular tenderness. Trachea: No tracheal deviation. Cardiovascular:      Rate and Rhythm: Normal rate and regular rhythm. Pulses: Normal pulses. Heart sounds: Normal heart sounds. No murmur. No friction rub. No gallop. Pulmonary:      Effort: Pulmonary effort is normal. No respiratory distress. Breath sounds: Normal breath sounds. No stridor. No wheezing, rhonchi or rales. Comments: Lately clear and equal.  Speaking full sentences. Excellent aeration. No retractions. Chest:      Chest wall: No tenderness. Abdominal:      General: Bowel sounds are normal. There is no distension. Palpations: Abdomen is soft. There is no mass. Tenderness: There is no abdominal tenderness. There is no right CVA tenderness, left CVA tenderness, guarding or rebound. Hernia: No hernia is present. Comments: Soft nontender throughout. No rebound or guarding. No masses pulsatile otherwise. Musculoskeletal: Normal range of motion. General: Swelling and tenderness present. No deformity or signs of injury. Right lower leg: No edema. Left lower leg: No edema. Comments: His left anterior knee is swollen and diffusely tender to palpation. He does however have full active and passive flexion extension of his knee.   There is an excellent distal DP and PT pulse and sensation is within normal range or not returned as of this dictation. EMERGENCY DEPARTMENT COURSE and DIFFERENTIAL DIAGNOSIS/MDM:   Vitals:    Vitals:    02/08/20 1559   BP: (!) 168/77   Pulse: 76   Resp: 18   Temp: 99.2 °F (37.3 °C)   TempSrc: Oral   SpO2: 96%   Weight: 193 lb (87.5 kg)   Height: 5' 7\" (1.702 m)           MDM  Number of Diagnoses or Management Options  Injury of left knee, initial encounter:   Diagnosis management comments: 66-year-old man presents emerge department with knee pain as described above. In the emergency room, patient has normal hemodynamics afebrile no distress. Exam is significant for the tenderness and swelling the patient's left anterior knee. X-ray shows no sign of acute process including no fracture dislocation. The swelling is likely a small traumatic effusion. I have a very low clinical suspicion of knee dislocation given the mechanism. Differential includes sprain versus strain. Also doubt occult fracture    Patient says that his symptoms are mild and he feels well and wants to go home. Patient was able to ambulate. I pleasant discussion patient management. I discussed that he may need advanced imaging like an MRI and should see an orthopedist once the swelling goes down. I also discussed return precautions including new change worsening symptoms. Patient verbally acknowledged understood these instructions agreeable plan       Amount and/or Complexity of Data Reviewed  Independent visualization of images, tracings, or specimens: yes          REASSESSMENT              CONSULTS:  None    PROCEDURES:  Unless otherwise noted below, none     Procedures        FINAL IMPRESSION      1.  Injury of left knee, initial encounter          DISPOSITION/PLAN   DISPOSITION Decision To Discharge 02/08/2020 05:33:25 PM      PATIENT REFERRED TO:  Chapito Melton, [facility] [address] [facility]  [phone]    Schedule an appointment as soon as possible for a visit in 1

## 2020-02-19 ENCOUNTER — HOSPITAL ENCOUNTER (OUTPATIENT)
Dept: MRI IMAGING | Age: 75
Discharge: HOME | End: 2020-02-19
Payer: MEDICARE

## 2020-02-19 ENCOUNTER — OFFICE VISIT (OUTPATIENT)
Dept: ORTHOPEDIC SURGERY | Age: 75
End: 2020-02-19
Payer: MEDICARE

## 2020-02-19 VITALS
HEART RATE: 73 BPM | WEIGHT: 192.9 LBS | BODY MASS INDEX: 30.28 KG/M2 | HEIGHT: 67 IN | RESPIRATION RATE: 15 BRPM | OXYGEN SATURATION: 98 %

## 2020-02-19 PROCEDURE — 4040F PNEUMOC VAC/ADMIN/RCVD: CPT | Performed by: ORTHOPAEDIC SURGERY

## 2020-02-19 PROCEDURE — 1036F TOBACCO NON-USER: CPT | Performed by: ORTHOPAEDIC SURGERY

## 2020-02-19 PROCEDURE — G8417 CALC BMI ABV UP PARAM F/U: HCPCS | Performed by: ORTHOPAEDIC SURGERY

## 2020-02-19 PROCEDURE — 99203 OFFICE O/P NEW LOW 30 MIN: CPT | Performed by: ORTHOPAEDIC SURGERY

## 2020-02-19 PROCEDURE — 1123F ACP DISCUSS/DSCN MKR DOCD: CPT | Performed by: ORTHOPAEDIC SURGERY

## 2020-02-19 PROCEDURE — 73721 MRI JNT OF LWR EXTRE W/O DYE: CPT

## 2020-02-19 PROCEDURE — G8484 FLU IMMUNIZE NO ADMIN: HCPCS | Performed by: ORTHOPAEDIC SURGERY

## 2020-02-19 PROCEDURE — 3017F COLORECTAL CA SCREEN DOC REV: CPT | Performed by: ORTHOPAEDIC SURGERY

## 2020-02-19 PROCEDURE — G8428 CUR MEDS NOT DOCUMENT: HCPCS | Performed by: ORTHOPAEDIC SURGERY

## 2020-02-19 RX ORDER — OXYCODONE HYDROCHLORIDE AND ACETAMINOPHEN 5; 325 MG/1; MG/1
TABLET ORAL
COMMUNITY
Start: 2019-11-13 | End: 2020-02-24

## 2020-02-19 RX ORDER — OXYBUTYNIN CHLORIDE 5 MG/1
TABLET ORAL
COMMUNITY
Start: 2019-11-13 | End: 2020-02-24 | Stop reason: ALTCHOICE

## 2020-02-19 RX ORDER — SULFAMETHOXAZOLE AND TRIMETHOPRIM 800; 160 MG/1; MG/1
TABLET ORAL
COMMUNITY
Start: 2020-01-31 | End: 2020-02-24 | Stop reason: ALTCHOICE

## 2020-02-19 RX ORDER — CITALOPRAM 20 MG/1
TABLET ORAL
COMMUNITY
Start: 2020-01-30 | End: 2020-02-19

## 2020-02-19 RX ORDER — CITALOPRAM 20 MG/1
TABLET ORAL
COMMUNITY
End: 2020-02-19

## 2020-02-19 RX ORDER — NAPROXEN 500 MG/1
500 TABLET ORAL 2 TIMES DAILY PRN
Qty: 180 TABLET | Refills: 1 | Status: ON HOLD | OUTPATIENT
Start: 2020-02-19 | End: 2020-02-27

## 2020-02-19 RX ORDER — OXYCODONE HYDROCHLORIDE AND ACETAMINOPHEN 5; 325 MG/1; MG/1
1 TABLET ORAL EVERY 6 HOURS PRN
Qty: 20 TABLET | Refills: 0 | Status: SHIPPED | OUTPATIENT
Start: 2020-02-19 | End: 2020-02-26

## 2020-02-19 RX ORDER — CEPHALEXIN 250 MG/1
250 CAPSULE ORAL 4 TIMES DAILY
Qty: 4 CAPSULE | Refills: 0 | Status: SHIPPED | OUTPATIENT
Start: 2020-02-19 | End: 2020-02-20

## 2020-02-19 RX ORDER — CALCIUM CARBONATE/VITAMIN D3 500-10/5ML
LIQUID (ML) ORAL
COMMUNITY
Start: 2016-10-12

## 2020-02-19 RX ORDER — PSEUDOEPHEDRINE HYDROCHLORIDE 30 MG/1
TABLET ORAL
COMMUNITY

## 2020-02-19 ASSESSMENT — ENCOUNTER SYMPTOMS
CHEST TIGHTNESS: 0
COLOR CHANGE: 0
BACK PAIN: 0

## 2020-02-19 NOTE — PROGRESS NOTES
Subjective:      Patient ID: Moses Owusu is a 76 y.o. male. Patient is here today for FU of the ER. Patient states on 1/8/2020 he was walking out to get the mail when he felt something in his left knee pop and he fell to the ground. Patient states that he has been taking ibuprofen to help with the pain and pain medication. Patient states that pain today is an 8/10. He states that he is having a difficult time walking due to the pain. He has been using a walker and a cane to assist him. Patient states that he has a lot of swelling in the knee but it is getting bet. He does use ice and moist heat. He comes in today for his first visit with me in regards to his left knee injury. He states that he was walking and felt a loud snap in his left knee and fell to the ground. Since that time he is continued to have a dull, aching pain primarily along the front of his left knee. He also continues to have swelling and bruising globally in his left leg and knee. He states that he continues to have weakness when trying to walk or straighten out his left leg. He also feels like his knee is going to buckle on him while he tries to walk. Patient denies any prior injury to the involved extremity/ joint, denies numbness or tingling in the involved extremity and denies fever or chills. Review of Systems   Constitutional: Negative for activity change, chills and fever. Respiratory: Negative for chest tightness. Cardiovascular: Negative for chest pain. Musculoskeletal: Positive for arthralgias, gait problem, joint swelling and myalgias. Negative for back pain. Skin: Negative for color change, pallor, rash and wound. Neurological: Positive for weakness. Negative for numbness.        Past Medical History:   Diagnosis Date    Arthritis     BPH (benign prostatic hyperplasia)     CPAP (continuous positive airway pressure) dependence     Diabetes mellitus ([facility]     dx 2012    Erectile dysfunction     Hyperlipidemia     Sleep apnea     had sleep study done 2015=- uses cpap nightly    Thyroid cancer ([facility] 06/2018    Total thyroidectomy     Wears dentures     upper    Wears glasses     \"just to read\"       Objective:   Physical Exam  Constitutional:       Appearance: He is well-developed. HENT:      Head: Normocephalic. Eyes:      Pupils: Pupils are equal, round, and reactive to light. Neck:      Musculoskeletal: Normal range of motion. Pulmonary:      Effort: Pulmonary effort is normal.   Musculoskeletal:         General: Swelling, tenderness and signs of injury present. No deformity. Right hip: Normal.      Left hip: Normal.      Right knee: He exhibits normal range of motion, no swelling, no effusion, no ecchymosis, no deformity, no laceration, no erythema, normal alignment, no LCL laxity, normal patellar mobility, no bony tenderness and no MCL laxity. No tenderness found. No medial joint line, no lateral joint line, no MCL, no LCL and no patellar tendon tenderness noted. Left knee: He exhibits decreased range of motion and swelling. He exhibits no effusion, no ecchymosis, no deformity, no laceration, no erythema, normal alignment, no LCL laxity, normal patellar mobility, no bony tenderness and no MCL laxity. Tenderness found. Patellar tendon tenderness noted. No medial joint line, no lateral joint line, no MCL and no LCL tenderness noted. Skin:     General: Skin is warm and dry. Capillary Refill: Capillary refill takes less than 2 seconds. Coloration: Skin is not pale. Findings: No erythema or rash. Neurological:      Mental Status: He is alert and oriented to person, place, and time. Left knee-skin intact, moderate diffuse ecchymosis. He is unable to actively extend the left knee, palpable defect present along the patellar tendon.     XRAY  X-ray 2 views of the left knee from February 8, 2020 reviewed by me today in the office demonstrates age appropriate bone density throughout with moderate medial and patellofemoral joint space narrowing, normal tracking of the patella, no acute osseous abnormalities. Assessment:      Left knee patellar tendon rupture  Left knee DJD, moderate      Plan:      I discussed with him today his x-ray findings. I explained to him that clinically he is showing signs of a left patellar tendon rupture. I will order an MRI to confirm the diagnosis. I did briefly discuss the surgical treatment with him today. Continue knee brace as needed for comfort. He can continue weightbearing as tolerated on the left leg. Ice and elevate as needed. Tylenol or Motrin as needed. Follow-up next Wednesday to review MRI and will likely proceed with surgical treatment next Thursday in [place]. [address], DO        X-ray done at the ER on 28/2020  Impression   1. No acute bony abnormality   2.  Possible small joint effusion

## 2020-02-20 ENCOUNTER — TELEPHONE (OUTPATIENT)
Dept: ORTHOPEDIC SURGERY | Age: 75
End: 2020-02-20

## 2020-02-20 NOTE — TELEPHONE ENCOUNTER
Called patient's insurance spoke with Maria Ines Menjivar, no prior Edwige Birch is needed for cpt codes: [address] [facility], [address] [facility]  Ref# [id number]

## 2020-02-24 ENCOUNTER — OFFICE VISIT (OUTPATIENT)
Dept: ORTHOPEDIC SURGERY | Age: 75
End: 2020-02-24
Payer: MEDICARE

## 2020-02-24 VITALS — BODY MASS INDEX: 30.13 KG/M2 | WEIGHT: 192 LBS | RESPIRATION RATE: 16 BRPM | HEIGHT: 67 IN

## 2020-02-24 PROCEDURE — 99213 OFFICE O/P EST LOW 20 MIN: CPT | Performed by: ORTHOPAEDIC SURGERY

## 2020-02-24 PROCEDURE — 1123F ACP DISCUSS/DSCN MKR DOCD: CPT | Performed by: ORTHOPAEDIC SURGERY

## 2020-02-24 PROCEDURE — G8484 FLU IMMUNIZE NO ADMIN: HCPCS | Performed by: ORTHOPAEDIC SURGERY

## 2020-02-24 PROCEDURE — G8417 CALC BMI ABV UP PARAM F/U: HCPCS | Performed by: ORTHOPAEDIC SURGERY

## 2020-02-24 PROCEDURE — 1036F TOBACCO NON-USER: CPT | Performed by: ORTHOPAEDIC SURGERY

## 2020-02-24 PROCEDURE — G8427 DOCREV CUR MEDS BY ELIG CLIN: HCPCS | Performed by: ORTHOPAEDIC SURGERY

## 2020-02-24 PROCEDURE — 3017F COLORECTAL CA SCREEN DOC REV: CPT | Performed by: ORTHOPAEDIC SURGERY

## 2020-02-24 PROCEDURE — 4040F PNEUMOC VAC/ADMIN/RCVD: CPT | Performed by: ORTHOPAEDIC SURGERY

## 2020-02-24 RX ORDER — CETIRIZINE HYDROCHLORIDE 10 MG/1
TABLET ORAL
COMMUNITY

## 2020-02-24 RX ORDER — TRAZODONE HYDROCHLORIDE 50 MG/1
50 TABLET ORAL NIGHTLY
COMMUNITY

## 2020-02-24 ASSESSMENT — ENCOUNTER SYMPTOMS
CHEST TIGHTNESS: 0
COLOR CHANGE: 0
BACK PAIN: 0

## 2020-02-24 NOTE — PROGRESS NOTES
Subjective:      Patient ID: Mio Edwards is a 76 y.o. male. Patient here for a follow up on left knee pain and to discuss MRI results in further detail. A STAT MRI was ordered at his initial visit which revealed a full-thickness tear of the left quadriceps tendon with retraction. He has been trying to NWB and wearing the knee brace as directed. He rates his pain a 5/10 today if he moves the knee any certain way. The bruising as started to subside and he has stopped his 325 mg ASA, stopped on 2/20/2020. No new issues or complaints. MRI LEFT KNEE 2/19/2020  Impression  Complete full-thickness tear of the entire quadriceps tendon with 2 cm  retraction.     Large joint effusion.  No patellar tendon tear.  No ligament or meniscus tear.     Minimal cartilage surface irregularity in the patella and medial femoral  condyle.       He comes in today for recheck of his left knee injury and follow-up after MRI. He states that since I saw him last he has been wearing his hinged knee brace but continues to have swelling and bruising globally in his left leg and knee. He states that he continues to have weakness when trying to walk or straighten out his left leg. He also feels like his knee is going to buckle on him while he tries to walk. Patient denies any prior injury to the involved extremity/ joint, denies numbness or tingling in the involved extremity and denies fever or chills. He was recently diagnosed with prostate cancer and is being evaluated for treatment options for that at this point. Knee Pain    Pertinent negatives include no numbness. Review of Systems   Constitutional: Negative for activity change, chills and fever. Respiratory: Negative for chest tightness. Cardiovascular: Negative for chest pain. Musculoskeletal: Positive for arthralgias, gait problem, joint swelling and myalgias. Negative for back pain. Skin: Negative for color change, pallor, rash and wound. Left knee-skin intact, moderate diffuse ecchymosis. He is unable to actively extend the left knee, palpable defect present along the patellar tendon. XRAY  X-ray 2 views of the left knee from February 8, 2020 reviewed by me today in the office demonstrates age appropriate bone density throughout with moderate medial and patellofemoral joint space narrowing, normal tracking of the patella, no acute osseous abnormalities. MRI of the left knee from February 19, 2020 reviewed by me today in the office demonstrates a large joint effusion, there is a complete rupture of the quadriceps tendon with 2 cm of proximal retraction, laxity of the patellar tendon which is intact. Assessment:      Left knee quadriceps tendon rupture  Left knee DJD, moderate      Plan:      I discussed with him today his MRI findings. I explained to him that he did rupture his left quadriceps tendon. I explained to him that this will require surgical treatment. I discussed with him today performing repair of his left quadriceps tendon with Flowermend tissue graft. I explained risks, benefits, possible complications of the procedure and answered all questions for the patient. I explained postoperative rehabilitation protocol and expectations with the patient today. The patient understands and consents to the procedure. Patient will follow up with their primary care physician prior to surgical treatment for preoperative clearance. We will schedule surgery at soonest convenience. Continue knee brace as needed for comfort. Continue weight-bearing as tolerated. Continue range of motion exercises as instructed. Ice and elevate as needed. Tylenol or Motrin for pain. Follow up in 2 weeks postop. We will plan on proceeding with surgical treatment Thursday in [place]. [address], DO        X-ray done at the ER on 28/2020  Impression   1. No acute bony abnormality   2.  Possible small joint effusion

## 2020-02-24 NOTE — PROGRESS NOTES
Patient here for a follow up on left knee pain and to discuss MRI results in further detail. A STAT MRI was ordered at his initial visit which revealed a full-thickness tear of the left quadriceps tendon with retraction. He has been trying to NWB and wearing the knee brace as directed. He rates his pain a 5/10 today if he moves the knee any certain way. The bruising as started to subside and he has stopped his 325 mg ASA, stopped on 2/20/2020. No new issues or complaints.        MRI LEFT KNEE 2/19/2020  Impression   Complete full-thickness tear of the entire quadriceps tendon with 2 cm   retraction.       Large joint effusion.  No patellar tendon tear.  No ligament or meniscus tear.       Minimal cartilage surface irregularity in the patella and medial femoral   condyle.

## 2020-02-25 ENCOUNTER — ANESTHESIA EVENT (OUTPATIENT)
Dept: OPERATING ROOM | Age: 75
End: 2020-02-25
Payer: MEDICARE

## 2020-02-25 NOTE — ANESTHESIA PRE PROCEDURE
Department of Anesthesiology  Preprocedure Note       Name:  Mel Chou   Age:  76 y.o.  :  1945                                          MRN:  [id number]         Date:  2020      Surgeon: Jai Khan):  Anni Meredith DO    Procedure: Procedure(s):  CHOLECYSTECTOMY LAPAROSCOPIC    Medications prior to admission:   Prior to Admission medications    Medication Sig Start Date End Date Taking? Authorizing Provider   metFORMIN (GLUCOPHAGE) 500 MG tablet 500 mg 2 times daily (with meals)  20   Historical Provider, MD   cetirizine (ZYRTEC) 10 MG tablet Take by mouth    Historical Provider, MD   traZODone (DESYREL) 50 MG tablet Take 50 mg by mouth nightly    Historical Provider, MD   Magnesium Oxide 400 MG CAPS Take by mouth 10/12/16   Historical Provider, MD   pseudoephedrine (SUDAFED) 30 MG tablet Take by mouth PRN    Historical Provider, MD   naproxen (NAPROSYN) 500 MG tablet Take 1 tablet by mouth 2 times daily as needed for Pain 2/19/20 3/20/20  Anni Meredith DO   oxyCODONE-acetaminophen (PERCOCET) 5-325 MG per tablet Take 1 tablet by mouth every 6 hours as needed for Pain for up to 7 days. Intended supply: 7 days.  Take lowest dose possible to manage pain 20  Anni Meredith DO   Multiple Vitamins-Minerals (FITNESS TABS FOR MEN AM/PM PO) Take by mouth    Historical Provider, MD   Omega-3 Fatty Acids (OMEGA-3 FISH OIL) 1000 MG CAPS Take by mouth    Historical Provider, MD   fluticasone (FLONASE) 50 MCG/ACT nasal spray 1 spray by Each Nare route daily    Historical Provider, MD   diphenhydrAMINE (BENADRYL) 25 MG tablet Take 25 mg by mouth every 6 hours as needed for Itching    Historical Provider, MD   levothyroxine (SYNTHROID) 150 MCG tablet Take 150 mcg by mouth daily    Historical Provider, MD   calcium 600 MG TABS tablet Take 500 mg by mouth daily 1800 total    Historical Provider, MD   melatonin 3 MG TABS tablet Take 3 mg by mouth daily    Historical Provider, MD   glipiZIDE (GLUCOTROL) 10 MG tablet Take 10 mg by mouth every morning (before breakfast)     Historical Provider, MD   gemfibrozil (LOPID) 600 MG tablet Take 600 mg by mouth 2 times daily (before meals). Historical Provider, MD   simvastatin (ZOCOR) 40 MG tablet Take 40 mg by mouth nightly. Historical Provider, MD   aspirin 325 MG tablet Take 325 mg by mouth daily Stopped on 2/20/2020    Historical Provider, MD       Current medications:    Current Outpatient Medications   Medication Sig Dispense Refill    metFORMIN (GLUCOPHAGE) 500 MG tablet 500 mg 2 times daily (with meals)       cetirizine (ZYRTEC) 10 MG tablet Take by mouth      traZODone (DESYREL) 50 MG tablet Take 50 mg by mouth nightly      Magnesium Oxide 400 MG CAPS Take by mouth      pseudoephedrine (SUDAFED) 30 MG tablet Take by mouth PRN      naproxen (NAPROSYN) 500 MG tablet Take 1 tablet by mouth 2 times daily as needed for Pain 180 tablet 1    oxyCODONE-acetaminophen (PERCOCET) 5-325 MG per tablet Take 1 tablet by mouth every 6 hours as needed for Pain for up to 7 days. Intended supply: 7 days. Take lowest dose possible to manage pain 20 tablet 0    Multiple Vitamins-Minerals (FITNESS TABS FOR MEN AM/PM PO) Take by mouth      Omega-3 Fatty Acids (OMEGA-3 FISH OIL) 1000 MG CAPS Take by mouth      fluticasone (FLONASE) 50 MCG/ACT nasal spray 1 spray by Each Nare route daily      diphenhydrAMINE (BENADRYL) 25 MG tablet Take 25 mg by mouth every 6 hours as needed for Itching      levothyroxine (SYNTHROID) 150 MCG tablet Take 150 mcg by mouth daily      calcium 600 MG TABS tablet Take 500 mg by mouth daily 1800 total      melatonin 3 MG TABS tablet Take 3 mg by mouth daily      glipiZIDE (GLUCOTROL) 10 MG tablet Take 10 mg by mouth every morning (before breakfast)       gemfibrozil (LOPID) 600 MG tablet Take 600 mg by mouth 2 times daily (before meals).  simvastatin (ZOCOR) 40 MG tablet Take 40 mg by mouth nightly.       aspirin 325 MG  Alcohol use: No     Comment: \"use to drink in regular basis- quit age 42's                                Counseling given: Not Answered  Comment: \"use the vapor cigarette occ not on regular basis\"      Vital Signs (Current): There were no vitals filed for this visit. BP Readings from Last 3 Encounters:   02/08/20 (!) 168/77   08/24/19 125/75   12/04/18 124/70       NPO Status:                                                                                 BMI:   Wt Readings from Last 3 Encounters:   02/24/20 192 lb (87.1 kg)   02/19/20 192 lb 14.4 oz (87.5 kg)   02/08/20 193 lb (87.5 kg)     There is no height or weight on file to calculate BMI.    CBC:   Lab Results   Component Value Date    WBC 5.6 08/24/2019    RBC 4.93 08/24/2019    HGB 14.6 08/24/2019    HCT 43.5 08/24/2019    MCV 88.2 08/24/2019    RDW 12.1 08/24/2019     08/24/2019       CMP:   Lab Results   Component Value Date     08/24/2019    K 4.3 08/24/2019    CL 98 08/24/2019    CO2 29 08/24/2019    BUN 16 08/24/2019    CREATININE 1.0 08/24/2019    GFRAA >60 08/24/2019    LABGLOM >60 08/24/2019    GLUCOSE 184 08/24/2019    PROT 7.4 08/24/2019    CALCIUM 9.1 08/24/2019    BILITOT 0.4 08/24/2019    ALKPHOS 59 08/24/2019    AST 27 08/24/2019    ALT 7 08/24/2019       POC Tests:   No results for input(s): POCGLU, POCNA, POCK, POCCL, POCBUN, POCHEMO, POCHCT in the last 72 hours.     Coags:   Lab Results   Component Value Date    PROTIME 12.6 08/26/2016    INR 1.10 08/26/2016    APTT 36.3 08/26/2016       HCG (If Applicable): No results found for: PREGTESTUR, PREGSERUM, HCG, HCGQUANT     ABGs: No results found for: PHART, PO2ART, RNT1QVG, YYX4QKK, BEART, C4SBLDQE     Type & Screen (If Applicable):  No results found for: LABABO, [address]    Anesthesia Evaluation  Patient summary reviewed and Nursing notes reviewed  Airway: Mallampati: III  TM distance: <3 FB   Neck ROM: limited  Mouth opening: > = 3 FB Dental:    (+) upper dentures      Pulmonary:normal exam    (+) sleep apnea: on CPAP,  current smoker          Patient smoked on day of surgery. Cardiovascular:  Exercise tolerance: good (>4 METS),   (+) hyperlipidemia      ECG reviewed               Beta Blocker:  Not on Beta Blocker         Neuro/Psych:   Negative Neuro/Psych ROS              GI/Hepatic/Renal:            ROS comment: Chronic idiopathic pancreatitis . Endo/Other:    (+) DiabetesType II DM, well controlled, , hypothyroidism: arthritis: OA., malignancy/cancer. Pt had no PAT visit        ROS comment: Thyroid cA Abdominal:           Vascular: negative vascular ROS. Anesthesia Plan      general and regional     ASA 2     (Risks benefits of geta discussed pt agrees to proceed)  Induction: intravenous. MIPS: Postoperative opioids intended and Prophylactic antiemetics administered. Anesthetic plan and risks discussed with patient. Plan discussed with CRNA and attending.               NILSON Hall - DEBI   2/25/2020

## 2020-02-27 ENCOUNTER — HOSPITAL ENCOUNTER (OUTPATIENT)
Age: 75
Setting detail: OUTPATIENT SURGERY
Discharge: HOME | End: 2020-02-27
Attending: ORTHOPAEDIC SURGERY | Admitting: ORTHOPAEDIC SURGERY
Payer: MEDICARE

## 2020-02-27 ENCOUNTER — ANESTHESIA (OUTPATIENT)
Dept: OPERATING ROOM | Age: 75
End: 2020-02-27
Payer: MEDICARE

## 2020-02-27 VITALS
RESPIRATION RATE: 8 BRPM | OXYGEN SATURATION: 99 % | SYSTOLIC BLOOD PRESSURE: 118 MMHG | DIASTOLIC BLOOD PRESSURE: 61 MMHG | TEMPERATURE: 96.8 F

## 2020-02-27 VITALS
TEMPERATURE: 97.6 F | DIASTOLIC BLOOD PRESSURE: 89 MMHG | OXYGEN SATURATION: 98 % | WEIGHT: 192 LBS | HEART RATE: 82 BPM | HEIGHT: 67 IN | BODY MASS INDEX: 30.13 KG/M2 | SYSTOLIC BLOOD PRESSURE: 160 MMHG | RESPIRATION RATE: 16 BRPM

## 2020-02-27 LAB — GLUCOSE BLD-MCNC: 222 MG/DL (ref 70–99)

## 2020-02-27 PROCEDURE — 3600000003 HC SURGERY LEVEL 3 BASE: Performed by: ORTHOPAEDIC SURGERY

## 2020-02-27 PROCEDURE — L1810 KO ELASTIC WITH JOINTS: HCPCS | Performed by: ORTHOPAEDIC SURGERY

## 2020-02-27 PROCEDURE — 2580000003 HC RX 258: Performed by: ORTHOPAEDIC SURGERY

## 2020-02-27 PROCEDURE — 82962 GLUCOSE BLOOD TEST: CPT

## 2020-02-27 PROCEDURE — 3600000013 HC SURGERY LEVEL 3 ADDTL 15MIN: Performed by: ORTHOPAEDIC SURGERY

## 2020-02-27 PROCEDURE — 3700000001 HC ADD 15 MINUTES (ANESTHESIA): Performed by: ORTHOPAEDIC SURGERY

## 2020-02-27 PROCEDURE — 6360000002 HC RX W HCPCS: Performed by: ORTHOPAEDIC SURGERY

## 2020-02-27 PROCEDURE — 27385 REPAIR OF THIGH MUSCLE: CPT | Performed by: ORTHOPAEDIC SURGERY

## 2020-02-27 PROCEDURE — C1713 ANCHOR/SCREW BN/BN,TIS/BN: HCPCS | Performed by: ORTHOPAEDIC SURGERY

## 2020-02-27 PROCEDURE — 7100000000 HC PACU RECOVERY - FIRST 15 MIN: Performed by: ORTHOPAEDIC SURGERY

## 2020-02-27 PROCEDURE — 6360000002 HC RX W HCPCS: Performed by: NURSE ANESTHETIST, CERTIFIED REGISTERED

## 2020-02-27 PROCEDURE — 7100000010 HC PHASE II RECOVERY - FIRST 15 MIN: Performed by: ORTHOPAEDIC SURGERY

## 2020-02-27 PROCEDURE — 7100000011 HC PHASE II RECOVERY - ADDTL 15 MIN: Performed by: ORTHOPAEDIC SURGERY

## 2020-02-27 PROCEDURE — 3700000000 HC ANESTHESIA ATTENDED CARE: Performed by: ORTHOPAEDIC SURGERY

## 2020-02-27 PROCEDURE — 7100000001 HC PACU RECOVERY - ADDTL 15 MIN: Performed by: ORTHOPAEDIC SURGERY

## 2020-02-27 PROCEDURE — 2709999900 HC NON-CHARGEABLE SUPPLY: Performed by: ORTHOPAEDIC SURGERY

## 2020-02-27 DEVICE — IMPLANTABLE DEVICE: Type: IMPLANTABLE DEVICE | Site: KNEE | Status: FUNCTIONAL

## 2020-02-27 RX ORDER — PROPOFOL 10 MG/ML
INJECTION, EMULSION INTRAVENOUS PRN
Status: DISCONTINUED | OUTPATIENT
Start: 2020-02-27 | End: 2020-02-27 | Stop reason: SDUPTHER

## 2020-02-27 RX ORDER — ONDANSETRON 2 MG/ML
INJECTION INTRAMUSCULAR; INTRAVENOUS PRN
Status: DISCONTINUED | OUTPATIENT
Start: 2020-02-27 | End: 2020-02-27 | Stop reason: SDUPTHER

## 2020-02-27 RX ORDER — OXYCODONE HYDROCHLORIDE 5 MG/1
10 TABLET ORAL EVERY 4 HOURS PRN
Status: DISCONTINUED | OUTPATIENT
Start: 2020-02-27 | End: 2020-02-27 | Stop reason: HOSPADM

## 2020-02-27 RX ORDER — LIDOCAINE HYDROCHLORIDE 20 MG/ML
INJECTION, SOLUTION INTRAVENOUS PRN
Status: DISCONTINUED | OUTPATIENT
Start: 2020-02-27 | End: 2020-02-27 | Stop reason: SDUPTHER

## 2020-02-27 RX ORDER — SODIUM CHLORIDE, SODIUM LACTATE, POTASSIUM CHLORIDE, CALCIUM CHLORIDE 600; 310; 30; 20 MG/100ML; MG/100ML; MG/100ML; MG/100ML
INJECTION, SOLUTION INTRAVENOUS CONTINUOUS
Status: DISCONTINUED | OUTPATIENT
Start: 2020-02-27 | End: 2020-02-27 | Stop reason: HOSPADM

## 2020-02-27 RX ORDER — SODIUM CHLORIDE 0.9 % (FLUSH) 0.9 %
10 SYRINGE (ML) INJECTION PRN
Status: DISCONTINUED | OUTPATIENT
Start: 2020-02-27 | End: 2020-02-27 | Stop reason: HOSPADM

## 2020-02-27 RX ORDER — CEFAZOLIN SODIUM 2 G/50ML
2 SOLUTION INTRAVENOUS
Status: COMPLETED | OUTPATIENT
Start: 2020-02-27 | End: 2020-02-27

## 2020-02-27 RX ORDER — SODIUM CHLORIDE 0.9 % (FLUSH) 0.9 %
10 SYRINGE (ML) INJECTION EVERY 12 HOURS SCHEDULED
Status: DISCONTINUED | OUTPATIENT
Start: 2020-02-27 | End: 2020-02-27 | Stop reason: HOSPADM

## 2020-02-27 RX ORDER — DEXAMETHASONE SODIUM PHOSPHATE 4 MG/ML
INJECTION, SOLUTION INTRA-ARTICULAR; INTRALESIONAL; INTRAMUSCULAR; INTRAVENOUS; SOFT TISSUE PRN
Status: DISCONTINUED | OUTPATIENT
Start: 2020-02-27 | End: 2020-02-27 | Stop reason: SDUPTHER

## 2020-02-27 RX ORDER — OXYCODONE HYDROCHLORIDE 5 MG/1
5 TABLET ORAL EVERY 4 HOURS PRN
Status: DISCONTINUED | OUTPATIENT
Start: 2020-02-27 | End: 2020-02-27 | Stop reason: HOSPADM

## 2020-02-27 RX ORDER — ROPIVACAINE HYDROCHLORIDE 5 MG/ML
INJECTION, SOLUTION EPIDURAL; INFILTRATION; PERINEURAL
Status: COMPLETED | OUTPATIENT
Start: 2020-02-27 | End: 2020-02-27

## 2020-02-27 RX ORDER — KETOROLAC TROMETHAMINE 30 MG/ML
INJECTION, SOLUTION INTRAMUSCULAR; INTRAVENOUS PRN
Status: DISCONTINUED | OUTPATIENT
Start: 2020-02-27 | End: 2020-02-27 | Stop reason: SDUPTHER

## 2020-02-27 RX ORDER — FENTANYL CITRATE 50 UG/ML
INJECTION, SOLUTION INTRAMUSCULAR; INTRAVENOUS PRN
Status: DISCONTINUED | OUTPATIENT
Start: 2020-02-27 | End: 2020-02-27 | Stop reason: SDUPTHER

## 2020-02-27 RX ORDER — ACETAMINOPHEN 10 MG/ML
INJECTION, SOLUTION INTRAVENOUS PRN
Status: DISCONTINUED | OUTPATIENT
Start: 2020-02-27 | End: 2020-02-27 | Stop reason: SDUPTHER

## 2020-02-27 RX ADMIN — SODIUM CHLORIDE, POTASSIUM CHLORIDE, SODIUM LACTATE AND CALCIUM CHLORIDE: 600; 310; 30; 20 INJECTION, SOLUTION INTRAVENOUS at 09:47

## 2020-02-27 RX ADMIN — DEXAMETHASONE SODIUM PHOSPHATE 8 MG: 4 INJECTION, SOLUTION INTRAMUSCULAR; INTRAVENOUS at 12:30

## 2020-02-27 RX ADMIN — LIDOCAINE HYDROCHLORIDE 20 MG: 20 INJECTION, SOLUTION INTRAVENOUS at 12:26

## 2020-02-27 RX ADMIN — ACETAMINOPHEN 1000 MG: 10 INJECTION, SOLUTION INTRAVENOUS at 12:34

## 2020-02-27 RX ADMIN — KETOROLAC TROMETHAMINE 30 MG: 30 INJECTION, SOLUTION INTRAMUSCULAR; INTRAVENOUS at 13:19

## 2020-02-27 RX ADMIN — FENTANYL CITRATE 50 MCG: 50 INJECTION INTRAMUSCULAR; INTRAVENOUS at 12:28

## 2020-02-27 RX ADMIN — Medication 10 ML: at 09:48

## 2020-02-27 RX ADMIN — CEFAZOLIN SODIUM 2 G: 2 SOLUTION INTRAVENOUS at 12:22

## 2020-02-27 RX ADMIN — FENTANYL CITRATE 50 MCG: 50 INJECTION INTRAMUSCULAR; INTRAVENOUS at 12:12

## 2020-02-27 RX ADMIN — FENTANYL CITRATE 50 MCG: 50 INJECTION INTRAMUSCULAR; INTRAVENOUS at 13:41

## 2020-02-27 RX ADMIN — FENTANYL CITRATE 50 MCG: 50 INJECTION INTRAMUSCULAR; INTRAVENOUS at 13:26

## 2020-02-27 RX ADMIN — ONDANSETRON HYDROCHLORIDE 4 MG: 4 INJECTION, SOLUTION INTRAMUSCULAR; INTRAVENOUS at 12:30

## 2020-02-27 RX ADMIN — PROPOFOL 150 MG: 10 INJECTION, EMULSION INTRAVENOUS at 12:26

## 2020-02-27 RX ADMIN — ROPIVACAINE HYDROCHLORIDE 30 ML: 5 INJECTION, SOLUTION EPIDURAL; INFILTRATION; PERINEURAL at 12:34

## 2020-02-27 RX ADMIN — SODIUM CHLORIDE, POTASSIUM CHLORIDE, SODIUM LACTATE AND CALCIUM CHLORIDE: 600; 310; 30; 20 INJECTION, SOLUTION INTRAVENOUS at 12:22

## 2020-02-27 ASSESSMENT — PULMONARY FUNCTION TESTS
PIF_VALUE: 10
PIF_VALUE: 10
PIF_VALUE: 2
PIF_VALUE: 10
PIF_VALUE: 1
PIF_VALUE: 10
PIF_VALUE: 19
PIF_VALUE: 10
PIF_VALUE: 10
PIF_VALUE: 11
PIF_VALUE: 11
PIF_VALUE: 10
PIF_VALUE: 11
PIF_VALUE: 10
PIF_VALUE: 12
PIF_VALUE: 12
PIF_VALUE: 10
PIF_VALUE: 10
PIF_VALUE: 11
PIF_VALUE: 11
PIF_VALUE: 10
PIF_VALUE: 11
PIF_VALUE: 19
PIF_VALUE: 10
PIF_VALUE: 23
PIF_VALUE: 11
PIF_VALUE: 2
PIF_VALUE: 10
PIF_VALUE: 11
PIF_VALUE: 10
PIF_VALUE: 10
PIF_VALUE: 11
PIF_VALUE: 10
PIF_VALUE: 10
PIF_VALUE: 11
PIF_VALUE: 11
PIF_VALUE: 10
PIF_VALUE: 11
PIF_VALUE: 10
PIF_VALUE: 14
PIF_VALUE: 1
PIF_VALUE: 3
PIF_VALUE: 1
PIF_VALUE: 10
PIF_VALUE: 11
PIF_VALUE: 10
PIF_VALUE: 11
PIF_VALUE: 10
PIF_VALUE: 11
PIF_VALUE: 10
PIF_VALUE: 11
PIF_VALUE: 10
PIF_VALUE: 10
PIF_VALUE: 11
PIF_VALUE: 10
PIF_VALUE: 10
PIF_VALUE: 11
PIF_VALUE: 10
PIF_VALUE: 11
PIF_VALUE: 1
PIF_VALUE: 10

## 2020-02-27 ASSESSMENT — LIFESTYLE VARIABLES: SMOKING_STATUS: 1

## 2020-02-27 NOTE — PROGRESS NOTES
Pt. Arrived in PACU via cart from the OR. Oxygen applied via NC @ 2L. Attached to monitor. Brakes applied, side rails up x 2. Vs stable. Pt. Awake, alert, and oriented x 4. Dressing to left knee is dry/intact. Immobilizer in place. Ice pack placed. IV infusing without difficulty. SCD to RLE. Bedside report received from Vinny Rausch RN and Jose Rasmussen CRNA. Will continue to monitor via bedside.

## 2020-02-27 NOTE — OP NOTE
aspect  of the patella. I then used a Hewson suture passer to pass the two  center limbs of the suture through the center hole. I then used the  suture passer to pass the medial limb of the suture through the medial  hole and the lateral limb of the suture through the lateral hole. With  the knee held in extension, I then tied the two lateral limbs of suture  together and the two medial limbs of suture together and advanced the  quadriceps tendon on to the superior pole of the patella. Excess suture  was then cut. With this repair completed, I was able to flex the knee  and had excellent stability at the repair site up to 45 degrees. I then augmented the repair using FlowerDerm 5 x 5 cm graft, which was  centered over the repair site. This was then sutured in place using  multiple 2-0 Vicryl sutures in simple fashion. With the augment  complete, I was able to again flex the knee to 45 degrees with excellent  stability at the repair site. I then reapproximated the bursal and superficial tissue overlying the patella and the repair site using 0 Vicryl suture in figure-of-eight fashion. The wound was then irrigated further with sterile normal saline. I then  closed the subcutaneous tissue using 2-0 Vicryl suture followed by skin  closure with staples. Tourniquet was then deflated for a total of 45  minutes and adequate hemostasis was maintained. I then applied a  sterile soft dressing to the left lower extremity, followed by knee brace locked from 0 to 30 degrees range of motion. The patient was then awakened from  anesthesia and transported to PACU in stable condition. He appeared to  have tolerated the procedure well. PROGNOSIS:  At this point, he will be discharged to home with a short  course of oral antibiotics as well as pain medication. He is to  maintain his brace at all times and is to remain strictly  nonweightbearing on the left leg.   I will see him back in the office in  2 weeks

## 2020-02-27 NOTE — H&P
Neurological: Positive for weakness. Negative for numbness.         Past Medical History        Past Medical History:   Diagnosis Date    Arthritis      BPH (benign prostatic hyperplasia)      CPAP (continuous positive airway pressure) dependence      Diabetes mellitus (HCC)       dx 2012    Erectile dysfunction      Hyperlipidemia      Sleep apnea       had sleep study done 2015=- uses cpap nightly    Thyroid cancer ([facility] [address] 06/2018     Total thyroidectomy     Wears dentures       upper    Wears glasses       \"just to read\"         No current facility-administered medications on file prior to encounter. Current Outpatient Medications on File Prior to Encounter   Medication Sig Dispense Refill    Magnesium Oxide 400 MG CAPS Take by mouth      pseudoephedrine (SUDAFED) 30 MG tablet Take by mouth PRN      Multiple Vitamins-Minerals (FITNESS TABS FOR MEN AM/PM PO) Take by mouth      Omega-3 Fatty Acids (OMEGA-3 FISH OIL) 1000 MG CAPS Take by mouth      fluticasone (FLONASE) 50 MCG/ACT nasal spray 1 spray by Each Nare route daily      diphenhydrAMINE (BENADRYL) 25 MG tablet Take 25 mg by mouth every 6 hours as needed for Itching      levothyroxine (SYNTHROID) 150 MCG tablet Take 150 mcg by mouth daily      calcium 600 MG TABS tablet Take 500 mg by mouth daily 1800 total      melatonin 3 MG TABS tablet Take 3 mg by mouth daily      glipiZIDE (GLUCOTROL) 10 MG tablet Take 10 mg by mouth every morning (before breakfast)       gemfibrozil (LOPID) 600 MG tablet Take 600 mg by mouth 2 times daily (before meals).  simvastatin (ZOCOR) 40 MG tablet Take 40 mg by mouth nightly.       aspirin 325 MG tablet Take 325 mg by mouth daily Stopped on 2/20/2020       Allergies   Allergen Reactions    Seasonal      Dust, dander,pollen    Ciprofloxacin Hives    Pcn [Penicillins] Hives     Past Surgical History:   Procedure Laterality Date    APPENDECTOMY  2006    CARPAL TUNNEL RELEASE Right 2014    CATARACT REMOVAL Bilateral 2015    CHOLECYSTECTOMY  10/11/2018    COLONOSCOPY      COLONOSCOPY  2013    EYE SURGERY Right     cataract extraction with implant/ firtz \"eyelid lift-     HERNIA REPAIR Right     inguinal    [facility],CHOLECYSTECTOMY N/A 10/11/2018    CHOLECYSTECTOMY LAPAROSCOPIC performed by Luh Wright MD at [address] Left      Social History     Tobacco Use    Smoking status: Former Smoker     Types: Cigarettes     Last attempt to quit: 1996     Years since quittin.5    Smokeless tobacco: Never Used    Tobacco comment: \"use the vapor cigarette occ not on regular basis\"   Substance Use Topics    Alcohol use: No     Comment: \"use to drink in regular basis- quit age 42's   [place] Drug use: No     Family History   Problem Relation Age of Onset    Heart Disease Mother     Stroke Mother     Stroke Father     High Blood Pressure Father           Objective:   Physical Exam  Constitutional:       Appearance: He is well-developed. HENT:      Head: Normocephalic. Eyes:      Pupils: Pupils are equal, round, and reactive to light. Neck:      Musculoskeletal: Normal range of motion. Pulmonary:      Effort: Pulmonary effort is normal.   Musculoskeletal:         General: Swelling, tenderness and signs of injury present. No deformity. Right hip: Normal.      Left hip: Normal.      Right knee: He exhibits normal range of motion, no swelling, no effusion, no ecchymosis, no deformity, no laceration, no erythema, normal alignment, no LCL laxity, normal patellar mobility, no bony tenderness and no MCL laxity. No tenderness found. No medial joint line, no lateral joint line, no MCL, no LCL and no patellar tendon tenderness noted. Left knee: He exhibits decreased range of motion and swelling.  He exhibits no effusion, no ecchymosis, no deformity, no laceration, no erythema, normal alignment, no LCL laxity, normal patellar mobility, no bony tenderness and no MCL laxity. Tenderness found. Patellar tendon tenderness noted. No medial joint line, no lateral joint line, no MCL and no LCL tenderness noted. Skin:     General: Skin is warm and dry. Capillary Refill: Capillary refill takes less than 2 seconds. Coloration: Skin is not pale. Findings: No erythema or rash. Neurological:      Mental Status: He is alert and oriented to person, place, and time. Left knee-skin intact, moderate diffuse ecchymosis. He is unable to actively extend the left knee, palpable defect present along the patellar tendon.     XRAY  X-ray 2 views of the left knee from February 8, 2020 reviewed by me today in the office demonstrates age appropriate bone density throughout with moderate medial and patellofemoral joint space narrowing, normal tracking of the patella, no acute osseous abnormalities.     MRI of the left knee from February 19, 2020 reviewed by me today in the office demonstrates a large joint effusion, there is a complete rupture of the quadriceps tendon with 2 cm of proximal retraction, laxity of the patellar tendon which is intact.     /72   Pulse 76   Temp 97 °F (36.1 °C) (Temporal)   Resp 16   Ht 5' 7\" (1.702 m)   Wt 192 lb (87.1 kg)   SpO2 96%   BMI 30.07 kg/m²        Assessment:   Left knee quadriceps tendon rupture  Left knee DJD, moderate                Plan:   I discussed with him today his MRI findings. I explained to him that he did rupture his left quadriceps tendon. I explained to him that this will require surgical treatment. I discussed with him today performing repair of his left quadriceps tendon with Flowermend tissue graft. I explained risks, benefits, possible complications of the procedure and answered all questions for the patient. I explained postoperative rehabilitation protocol and expectations with the patient today. The patient understands and consents to the procedure.     Patient will

## 2020-02-27 NOTE — ANESTHESIA POSTPROCEDURE EVALUATION
Department of Anesthesiology  Postprocedure Note    Patient: Kisha Maxwell  MRN: [id number]  YOB: 1945  Date of evaluation: 2/27/2020  Time:  2:13 PM     Procedure Summary     Date:  02/27/20 Room / Location:  [address]    Anesthesia Start:  0681 Anesthesia Stop:  9051    Procedure:  LEFT QUADRICEPS REPAIR (Left ) Diagnosis:  (rupture of left patellar tendon)    Surgeon:  Jaylin Hart DO Responsible Provider:  NILSON Jacome CRNA    Anesthesia Type:  general, regional ASA Status:  2          Anesthesia Type: general, regional    Eden Phase I: Eden Score: 10    Eden Phase II:      Last vitals: Reviewed and per EMR flowsheets.        Anesthesia Post Evaluation    Patient location during evaluation: bedside  Patient participation: complete - patient participated  Level of consciousness: awake and alert  Pain score: 0  Airway patency: patent  Nausea & Vomiting: no nausea and no vomiting  Complications: no  Cardiovascular status: blood pressure returned to baseline  Respiratory status: acceptable  Hydration status: euvolemic

## 2020-02-27 NOTE — PROGRESS NOTES
Pt. Awake, alert, and oriented x 4. On oxygen via NC @ 2L. Vs stable. Pt. Denies nausea or pain. Tolerating ice chips. Dressings to left knee are dry/intact. Immobilizer in place. Pt. Ready to be discharged from PACU.

## 2020-03-18 ENCOUNTER — OFFICE VISIT (OUTPATIENT)
Dept: ORTHOPEDIC SURGERY | Age: 75
End: 2020-03-18

## 2020-03-18 VITALS
RESPIRATION RATE: 15 BRPM | BODY MASS INDEX: 30.14 KG/M2 | WEIGHT: 192.02 LBS | HEIGHT: 67 IN | OXYGEN SATURATION: 98 % | HEART RATE: 85 BPM

## 2020-03-18 PROCEDURE — 99024 POSTOP FOLLOW-UP VISIT: CPT | Performed by: ORTHOPAEDIC SURGERY

## 2020-03-18 RX ORDER — CYCLOBENZAPRINE HCL 10 MG
TABLET ORAL
COMMUNITY
Start: 2020-03-09

## 2020-03-18 ASSESSMENT — ENCOUNTER SYMPTOMS
CHEST TIGHTNESS: 0
COLOR CHANGE: 0
BACK PAIN: 0

## 2020-03-18 NOTE — PROGRESS NOTES
Subjective:      Patient ID: Dayday Barbour is a 76 y.o. male. Patient returns today for post op 3 week left quad repair DOS 2/27/2020. Patient states pain today is 2/10. Patient states he has been nonweightbearing for the most part he has been using his crutches to help him get around. Patient states overall he is doing well. Patient states that the swelling has gone down a lot since surgery. Patient states he is ready to start physical therapy to help him get stretch back in his knee. He comes in today for his 3-week postop recheck after left quadriceps tendon repair. Overall he states that he is not having any pain in the left knee and he is now able to straighten out his left leg. He has remained nonweightbearing and has kept his hinged knee brace on from 0 to 30 degrees. Patient denies any new injury to the involved extremity/ joint, denies numbness or tingling in the involved extremity and denies fever or chills. Knee Pain    Pertinent negatives include no numbness. Review of Systems   Constitutional: Negative for activity change, chills and fever. Respiratory: Negative for chest tightness. Cardiovascular: Negative for chest pain. Musculoskeletal: Positive for gait problem, joint swelling and myalgias. Negative for arthralgias and back pain. Skin: Negative for color change, pallor, rash and wound. Neurological: Positive for weakness. Negative for numbness. Past Medical History:   Diagnosis Date    Arthritis     BPH (benign prostatic hyperplasia)     CPAP (continuous positive airway pressure) dependence     Diabetes mellitus ([address]     dx 2012    Erectile dysfunction     Hyperlipidemia     Sleep apnea     had sleep study done 2015=- uses cpap nightly    Thyroid cancer ([facility] [address] 06/2018    Total thyroidectomy     Wears dentures     upper    Wears glasses     \"just to read\"       Objective:   Physical Exam  Constitutional:       Appearance: He is well-developed. HENT:      Head: Normocephalic. Eyes:      Pupils: Pupils are equal, round, and reactive to light. Neck:      Musculoskeletal: Normal range of motion. Pulmonary:      Effort: Pulmonary effort is normal.   Musculoskeletal:         General: Swelling present. No tenderness, deformity or signs of injury. Right hip: Normal.      Left hip: Normal.      Right knee: He exhibits normal range of motion, no swelling, no effusion, no ecchymosis, no deformity, no laceration, no erythema, normal alignment, no LCL laxity, normal patellar mobility, no bony tenderness and no MCL laxity. No tenderness found. No medial joint line, no lateral joint line, no MCL, no LCL and no patellar tendon tenderness noted. Left knee: He exhibits decreased range of motion and swelling. He exhibits no effusion, no ecchymosis, no deformity, no laceration, no erythema, normal alignment, no LCL laxity, normal patellar mobility, no bony tenderness and no MCL laxity. No tenderness found. No medial joint line, no lateral joint line, no MCL and no LCL tenderness noted. Skin:     General: Skin is warm and dry. Capillary Refill: Capillary refill takes less than 2 seconds. Coloration: Skin is not pale. Findings: No erythema or rash. Neurological:      Mental Status: He is alert and oriented to person, place, and time. Left knee-Incision clean, dry, intact, with no erythema, no drainage, and no signs of infection. Staples present and removed today. 0-45      Assessment:      Left knee quadriceps tendon repair, 3 weeks      Plan:       I discussed with him today that he is progressing very well. I placed his knee brace into full extension. He can begin weightbearing as tolerated on the left leg with the knee brace in full extension. Avoid active extension and avoid flexion of the left knee. Tylenol or Motrin as needed. Ice and elevate as needed.   Follow-up in 1 month for recheck at which point we will begin formal physical therapy.           [address], DO

## 2020-04-15 ENCOUNTER — OFFICE VISIT (OUTPATIENT)
Dept: ORTHOPEDIC SURGERY | Age: 75
End: 2020-04-15

## 2020-04-15 VITALS — BODY MASS INDEX: 30.14 KG/M2 | WEIGHT: 192.02 LBS | HEIGHT: 67 IN

## 2020-04-15 PROCEDURE — 99024 POSTOP FOLLOW-UP VISIT: CPT | Performed by: ORTHOPAEDIC SURGERY

## 2020-04-15 RX ORDER — TRAMADOL HYDROCHLORIDE 50 MG/1
50 TABLET ORAL EVERY 4 HOURS PRN
COMMUNITY

## 2020-04-15 RX ORDER — CITALOPRAM 20 MG/1
TABLET ORAL
COMMUNITY
Start: 2020-01-30 | End: 2021-12-04

## 2020-04-15 ASSESSMENT — ENCOUNTER SYMPTOMS
COLOR CHANGE: 0
CHEST TIGHTNESS: 0
BACK PAIN: 0

## 2020-04-15 NOTE — PROGRESS NOTES
Subjective:      Patient ID: Ashia Gordon is a 76 y.o. male. Patient returns today for post op check of the left quad repair DOS 2/27/2020. Patient states that he has no pain today. Patient states that he has been wearing his ROM brace when he has been up weightbearing. He comes in today for his 7-week postop recheck after left quadriceps tendon repair. Overall he states that he is not having any pain in the left knee and he is now able to straighten out his left leg and has been working on flexion as well. He has continued wearing his knee brace in extension and has been able to begin walking on the left leg without any assistive devices. Overall he is very happy with his progress at this point. Patient denies any new injury to the involved extremity/ joint, denies numbness or tingling in the involved extremity and denies fever or chills. Knee Pain    Pertinent negatives include no numbness. Review of Systems   Constitutional: Negative for activity change, chills and fever. Respiratory: Negative for chest tightness. Cardiovascular: Negative for chest pain. Musculoskeletal: Positive for myalgias. Negative for arthralgias, back pain, gait problem and joint swelling. Skin: Negative for color change, pallor, rash and wound. Neurological: Positive for weakness. Negative for numbness. Past Medical History:   Diagnosis Date    Arthritis     BPH (benign prostatic hyperplasia)     CPAP (continuous positive airway pressure) dependence     Diabetes mellitus ([address]     dx 2012    Erectile dysfunction     Hyperlipidemia     Sleep apnea     had sleep study done 2015=- uses cpap nightly    Thyroid cancer ([facility] [address] 06/2018    Total thyroidectomy     Wears dentures     upper    Wears glasses     \"just to read\"       Objective:   Physical Exam  Constitutional:       Appearance: He is well-developed. HENT:      Head: Normocephalic.    Eyes:      Pupils: Pupils are equal, round, and

## 2020-05-13 ENCOUNTER — HOSPITAL ENCOUNTER (OUTPATIENT)
Dept: PHYSICAL THERAPY | Age: 75
Setting detail: THERAPIES SERIES
Discharge: HOME | End: 2020-05-13
Payer: MEDICARE

## 2020-05-13 PROCEDURE — 97112 NEUROMUSCULAR REEDUCATION: CPT

## 2020-05-13 PROCEDURE — 97162 PT EVAL MOD COMPLEX 30 MIN: CPT

## 2020-05-13 PROCEDURE — 97110 THERAPEUTIC EXERCISES: CPT

## 2020-05-13 NOTE — FLOWSHEET NOTE
Outpatient Physical Therapy  [place]           [] Phone: [phone]   Fax: [phone]  Bianca long           [x] Phone: [phone]   Fax: [phone]        Physical Therapy Daily Treatment Note  Date:  2020    Patient Name:  Yehuda Brown    :  1945  MRN: [id number]  Restrictions/Precautions:  Other position/activity restrictions: none  Diagnosis:   Diagnosis: L Quad tendon repair 20  Date of Injury/Surgery:   Treatment Diagnosis: Treatment Diagnosis: L leg weakness/ L knee stiffness/impaired mobility    Insurance/Certification information: PT Insurance Information: Medicare Advantage   Referring Physician:  Referring Practitioner: [place]  Next Doctor Visit:  20  Plan of care signed (Y/N):    Outcome Measure: STS  Visit# / total visits:   then PN  Pain level: /10   Goals:       Short term goals  Time Frame for Short term goals: 6 sessions  Short term goal 1: 5 sit to stands in 10 seconds  Long term goals  Time Frame for Long term goals : 10 weeks 20  Long term goal 1: patient's goal_ walk normal- regain PLOF  Long term goal 2: 14 sit to stands in 30 seconds   Long term goal 3: walk with out cane for all outdoor./community ADL    Summary of Evaluation: Assessment: sit to stands 5 in 12 sec/ 11 in 30 sec  ASSESSMENT  Patient primary complaints:  L leg weakness/ L knee stiffness/impaired mobility    History of condition:s/p L Quad tendon repair 20- doing well with recovery- has been gradually increasing weight - bearing- now walkng with cane- and increasing ROM - in post op brace and has adjusted/increased ROM over time  Current functional limitations:not driving- cautious with walking- limits exertion on knee   Clnical findings:L leg muscle weakness  PLOF: did not have L leg weakness- retired  Skilled PT interventions are intended to: increase L elg strength - wean off cane for normal community ADL funciton which will enable patient  to returnto PLOF andimprove

## 2020-05-13 NOTE — PROGRESS NOTES
Physical Therapy  Initial Assessment  Date: 2020  Patient Name: Anant Rowe  MRN: [id number]  : 1945     Treatment Diagnosis: L leg weakness/ L knee stiffness/impaired mobility    Restrictions  Position Activity Restriction  Other position/activity restrictions: none    Subjective   General  Chart Reviewed: Yes  Family / Caregiver Present: No  Referring Practitioner: Marques Yung  Diagnosis: L Quad tendon repair 20  Follows Commands: Within Functional Limits  General Comment  Comments: limited with yard work  PT Visit Information  PT Insurance Information: Medicare Advantage  Subjective  Subjective: s/p L Quad tendon repair 20- doing well with recovery  Pain Screening  Patient Currently in Pain: No  Vital Signs  Patient Currently in Pain: No    Vision/Hearing  Vision  Vision: (glassess)  Hearing  Hearing: Within functional limits    Orientation       Social/Functional History  Social/Functional History  Lives With: Spouse  Home Layout: One level  ADL Assistance: Independent  Homemaking Assistance: Independent  Ambulation Assistance: Independent  Transfer Assistance: Independent  Active : No  Occupation: Retired  Leisure & Hobbies: yoga    Objective     Observation/Palpation  Posture: Good  Palpation: no TTP  Scar: incision healed    AROM LLE (degrees)  LLE AROM : Exceptions  L Knee Flexion 0-145: 122* supine  L Knee Extension 0: -5* supine    Strength LLE  Strength LLE: Exception  L Knee Extension: At least;3+/5                      Ambulation  Ambulation?: Yes  Ambulation 1  Device: Single point cane  Assistance: Modified Independent  Gait Deviations: Slow [place];Decreased step length                            Assessment   Conditions Requiring Skilled Therapeutic Intervention  Body structures, Functions, Activity limitations: Decreased strength;Decreased ROM; Decreased functional mobility ; Decreased high-level IADLs  Assessment: sit to stands 5 in 12 sec/ 11 in 30 sec  Treatment Diagnosis: L leg weakness/ L knee stiffness/impaired mobility  Decision Making: Medium Complexity  History: PF- healing constraints on tendon repair  Exam: sit to stands/ knee ROM  Clinical Presentation: changing charactersitics of function due to weakness/ stiffness  Barriers to Learning: none  REQUIRES PT FOLLOW UP: Yes  Treatment Initiated : see flow sheet         Plan   Plan  Times per week: see POC  Plan weeks: see POC    G-Code       OutComes Score                                                  AM-PAC Score             Goals  Short term goals  Time Frame for Short term goals: 6 sessions  Short term goal 1: 5 sit to stands in 10 seconds  Long term goals  Time Frame for Long term goals : 10 weeks 7/24/20  Long term goal 1: patient's goal_ walk normal- regain PLOF  Long term goal 2: 14 sit to stands in 30 seconds   Long term goal 3: walk with out cane for all outdoor./community ADL       Therapy Time   Individual Concurrent Group Co-treatment   Time In 1235         Time Out 1323         Minutes 48         Timed Code Treatment Minutes: 25 Minutes       KEVIN Robertson, PT

## 2020-05-20 ENCOUNTER — OFFICE VISIT (OUTPATIENT)
Dept: ORTHOPEDIC SURGERY | Age: 75
End: 2020-05-20

## 2020-05-20 ENCOUNTER — HOSPITAL ENCOUNTER (OUTPATIENT)
Dept: PHYSICAL THERAPY | Age: 75
Setting detail: THERAPIES SERIES
Discharge: HOME | End: 2020-05-20
Payer: MEDICARE

## 2020-05-20 VITALS — RESPIRATION RATE: 16 BRPM | BODY MASS INDEX: 31.08 KG/M2 | HEIGHT: 67 IN | WEIGHT: 198 LBS

## 2020-05-20 PROCEDURE — 97530 THERAPEUTIC ACTIVITIES: CPT

## 2020-05-20 PROCEDURE — 97110 THERAPEUTIC EXERCISES: CPT

## 2020-05-20 PROCEDURE — 97112 NEUROMUSCULAR REEDUCATION: CPT

## 2020-05-20 PROCEDURE — 99024 POSTOP FOLLOW-UP VISIT: CPT | Performed by: ORTHOPAEDIC SURGERY

## 2020-05-20 ASSESSMENT — ENCOUNTER SYMPTOMS
COLOR CHANGE: 0
CHEST TIGHTNESS: 0
BACK PAIN: 0

## 2020-05-20 NOTE — PROGRESS NOTES
restrictions. I explained to him that we will take approximately a year and a half for the strength to return to normal.  I discussed with the patient today that their are symptoms are normal and should improve with time. Continue weight-bearing as tolerated. Continue range of motion exercises as instructed. Ice and elevate as needed. Tylenol or Motrin for pain. Follow up as needed.           Freedom Soler, DO

## 2020-05-20 NOTE — FLOWSHEET NOTE
Outpatient Physical Therapy  [facility]           [] Phone: [phone]   Fax: [phone]  Shaniqua Ramires           [x] Phone: [phone]   Fax: [phone]        Physical Therapy Daily Treatment Note  Date:  2020    Patient Name:  Frederic Dudley    :  1945  MRN: [id number]  Restrictions/Precautions:  Other position/activity restrictions: none  Diagnosis:   Diagnosis: L Quad tendon repair 20  Date of Injury/Surgery:   Treatment Diagnosis: Treatment Diagnosis: L leg weakness/ L knee stiffness/impaired mobility    Insurance/Certification information: PT Insurance Information: Medicare Advantage   Referring Physician:  Referring Practitioner: Emilee Adam  Next Doctor Visit:  20  Plan of care signed (Y/N):    Outcome Measure: STS  Visit# / total visits:  2 /6 then PN  Pain level: 0/10   Goals:       Short term goals  Time Frame for Short term goals: 6 sessions  Short term goal 1: 5 sit to stands in 10 seconds  Long term goals  Time Frame for Long term goals : 10 weeks 20  Long term goal 1: patient's goal_ walk normal- regain PLOF  Long term goal 2: 14 sit to stands in 30 seconds   Long term goal 3: walk with out cane for all outdoor./community ADL    Summary of Evaluation: Assessment: sit to stands 5 in 12 sec/ 11 in 30 sec  ASSESSMENT  Patient primary complaints:  L leg weakness/ L knee stiffness/impaired mobility    History of condition:s/p L Quad tendon repair 20- doing well with recovery- has been gradually increasing weight - bearing- now walkng with cane- and increasing ROM - in post op brace and has adjusted/increased ROM over time  Current functional limitations:not driving- cautious with walking- limits exertion on knee   Clnical findings:L leg muscle weakness  PLOF: did not have L leg weakness- retired  Skilled PT interventions are intended to: increase L elg strength - wean off cane for normal community ADL funciton which will enable patient  to returnto PLOF andimprove quality of life  Patient agrees with established plan of care and assisted in the development of their short term and long term goals  Barriers to learning:none- no mental/cognitive barriers observed  preferred learning style(s):   written- demonstration -practice  Preferred Language: English  Potential barriers to progress: The patient appears motivated to participate in PT and regain PLOF: yes      Subjective: Patient reports surgeon discontinued knee brace and patient has stopped using cane most of the time        Any changes in Ambulatory Summary Sheet? None        Objective:  lndependent gait- difficulty with DL balance on rocker board in frontal plane  . Prior to today's treatment session, patient was screened for signs and symptoms related to COVID-19 including but not limited to verbally answering questions related to feeling ill, cough, or SOB, along with taking temperature via forehead thermometer. Patient presented with all negative signs and symptoms and had no fever >100 degrees Fahrenheit this date.         Exercises: (No more than 4 columns)   Exercise/Equipment Date 5/13/20 Date 5/20/20 Date           WARM UP         nustep  Seat 11/arms 11 load 5 x10 min Seat 10/arms 10 load 5 x12min- less use of arms    Recumbent bike  x5 min    TABLE      saq FR 5 ct x15     laq x10 reps     Heel prop X 5min                    STANDING      mini squats @// bars Chair in front 5 count x 5 reps    lunges Focus L lef FWD In// bars 5 ct x 5 reps L lleg FWD only    stairs Reciprocal with hand rail --    FWD step ups 4\" up with L/down 4\" up with L/down with R - use of UE @ times    Rocker board  Frontal plane- static hold- UE support as needed 2 '    Sit to stands  from chair Arms across chest x 5 reps          3 way touch  To beach balls x 15 reps with UE support    Lateral  step/over  2' x 20 reps with UE support                                  MODALITIES                      Other Therapeutic Activities/Education:

## 2020-05-22 ENCOUNTER — HOSPITAL ENCOUNTER (OUTPATIENT)
Dept: PHYSICAL THERAPY | Age: 75
Setting detail: THERAPIES SERIES
Discharge: HOME | End: 2020-05-22
Payer: MEDICARE

## 2020-05-22 PROCEDURE — 97112 NEUROMUSCULAR REEDUCATION: CPT

## 2020-05-22 PROCEDURE — 97110 THERAPEUTIC EXERCISES: CPT

## 2020-05-22 PROCEDURE — 97530 THERAPEUTIC ACTIVITIES: CPT

## 2020-05-29 ENCOUNTER — HOSPITAL ENCOUNTER (OUTPATIENT)
Dept: PHYSICAL THERAPY | Age: 75
Setting detail: THERAPIES SERIES
Discharge: HOME | End: 2020-05-29
Payer: MEDICARE

## 2020-05-29 PROCEDURE — 97110 THERAPEUTIC EXERCISES: CPT

## 2020-05-29 PROCEDURE — 97112 NEUROMUSCULAR REEDUCATION: CPT

## 2020-05-29 PROCEDURE — 97530 THERAPEUTIC ACTIVITIES: CPT

## 2020-05-29 NOTE — FLOWSHEET NOTE
quality of life  Patient agrees with established plan of care and assisted in the development of their short term and long term goals  Barriers to learning:none- no mental/cognitive barriers observed  preferred learning style(s):   written- demonstration -practice  Preferred Language: English  Potential barriers to progress: The patient appears motivated to participate in PT and regain PLOF: yes      Subjective: Patient reports he feels he is bending over easier lately      Any changes in Ambulatory Summary Sheet? None        Objective:  Proper form with lunge . Prior to today's treatment session, patient was screened for signs and symptoms related to COVID-19 including but not limited to verbally answering questions related to feeling ill, cough, or SOB, along with taking temperature via forehead thermometer. Patient presented with all negative signs and symptoms and had no fever >100 degrees Fahrenheit this date.         Exercises: (No more than 4 columns)   Exercise/Equipment Date 5/20/20 Date 5/22/20 5/29/20 #4           WARM UP         nustep  Seat 10/arms 10 load 5 x12min- less use of arms Seat 10/arms 10 load 5 x12min- less use of arms Seat 10/arms 10 load 5 x12min- less use of arms   Recumbent bike x5 min x5 min x8min   TABLE      saq  stop --   laq  stop --   Heel prop  stop ---                  STANDING      mini squats Chair in front 5 count x 5 reps Brown I stool in front 5 count x 5 reps Brown I stool in front 10 count x 5 reps   lunges In// bars 5 ct x 5 reps L lleg FWD only In// bars 5 ct x 5 reps L lleg FWD only In// bars 5 ct x 5 reps L/R leg FWD  6\" box/+1 cushion for trail leg   FWD step ups 4\" up with L/down with R - use of UE @ times 4\" up with L/down with R - use of UE @ times  2 x12 6\" up with L/down with R - use of UE @ times  2 x12   Rocker board Frontal plane- static hold- UE support as needed 2 ' Frontal plane- static hold- UE support as needed 2 ' Frontal plane- static hold- UE support as

## 2020-06-03 ENCOUNTER — HOSPITAL ENCOUNTER (OUTPATIENT)
Dept: PHYSICAL THERAPY | Age: 75
Setting detail: THERAPIES SERIES
Discharge: HOME | End: 2020-06-03
Payer: MEDICARE

## 2020-06-03 PROCEDURE — 97530 THERAPEUTIC ACTIVITIES: CPT

## 2020-06-03 PROCEDURE — 97112 NEUROMUSCULAR REEDUCATION: CPT

## 2020-06-03 PROCEDURE — 97110 THERAPEUTIC EXERCISES: CPT

## 2020-06-03 NOTE — FLOWSHEET NOTE
Outpatient Physical Therapy  [facility]           [] Phone: [phone]   Fax: [phone]  [facility]ian[facility] long           [x] Phone: [phone]   Fax: [phone]        Physical Therapy Daily Treatment Note  Date:  6/3/2020    Patient Name:  Johanna Avery    :  1945  MRN: [id number]  Restrictions/Precautions:  Other position/activity restrictions: none  Diagnosis:   Diagnosis: L Quad tendon repair 20  Date of Injury/Surgery:   Treatment Diagnosis: Treatment Diagnosis: L leg weakness/ L knee stiffness/impaired mobility    Insurance/Certification information: PT Insurance Information: Medicare Advantage   Referring Physician:  Referring Practitioner: Juan Montes De Oca  Next Doctor Visit:  20  Plan of care signed (Y/N):    Outcome Measure: STS  Visit# / total visits:   then PN  Pain level: 0/10   Goals:       Short term goals  Time Frame for Short term goals: 6 sessions  Short term goal 1: 5 sit to stands in 10 seconds  Long term goals  Time Frame for Long term goals : 10 weeks 20  Long term goal 1: patient's goal_ walk normal- regain PLOF  Long term goal 2: 14 sit to stands in 30 seconds   Long term goal 3: walk with out cane for all outdoor./community ADL    Summary of Evaluation: Assessment: sit to stands 5 in 12 sec/ 11 in 30 sec  ASSESSMENT  Patient primary complaints:  L leg weakness/ L knee stiffness/impaired mobility    History of condition:s/p L Quad tendon repair 20- doing well with recovery- has been gradually increasing weight - bearing- now walkng with cane- and increasing ROM - in post op brace and has adjusted/increased ROM over time  Current functional limitations:not driving- cautious with walking- limits exertion on knee   Clnical findings:L leg muscle weakness  PLOF: did not have L leg weakness- retired  Skilled PT interventions are intended to: increase L elg strength - wean off cane for normal community ADL funciton which will enable patient  to returnto PLOF andimprove Code/Total Treatment Minutes:  20' TE x1, 15' NRE x1   15' TA x1/ 50'   Next Progress Note due:        Plan of Care Interventions:  [x] Therapeutic Exercise  [] Modalities:  [x] Therapeutic Activity     [] Ultrasound  [] Estim  [x] Gait Training      [] Cervical Traction [] Lumbar Traction  [x] Neuromuscular Re-education    [x] Cold/hotpack [] Iontophoresis   [x] Instruction in HEP      [x] Vasopneumatic   [] Dry Needling    [x] Manual Therapy               [] Aquatic Therapy              Electronically signed by:  Edwige Damian 6/3/2020, 10:00 AM

## 2020-06-05 ENCOUNTER — HOSPITAL ENCOUNTER (OUTPATIENT)
Dept: PHYSICAL THERAPY | Age: 75
Setting detail: THERAPIES SERIES
Discharge: HOME | End: 2020-06-05
Payer: MEDICARE

## 2020-06-05 PROCEDURE — 97110 THERAPEUTIC EXERCISES: CPT

## 2020-06-05 PROCEDURE — 97530 THERAPEUTIC ACTIVITIES: CPT

## 2020-06-05 PROCEDURE — 97112 NEUROMUSCULAR REEDUCATION: CPT

## 2020-06-12 ENCOUNTER — HOSPITAL ENCOUNTER (OUTPATIENT)
Dept: PHYSICAL THERAPY | Age: 75
Setting detail: THERAPIES SERIES
Discharge: HOME | End: 2020-06-12
Payer: MEDICARE

## 2020-06-12 PROCEDURE — 97112 NEUROMUSCULAR REEDUCATION: CPT

## 2020-06-12 PROCEDURE — 97110 THERAPEUTIC EXERCISES: CPT

## 2020-06-12 PROCEDURE — 97530 THERAPEUTIC ACTIVITIES: CPT

## 2020-06-12 NOTE — FLOWSHEET NOTE
support as needed 2' Frontal plane- static hold- UE support as needed 2' Frontal plane- static hold- UE support as needed 2'        3 way touch SLS stance blance  X 1' SLS stance blance  X 1'15 sec R foot on orange cone  x1' total next   Lateral  step/over 4' x 14 reps without UE support 6' x 10 reps without UE support 6' x 15 reps without UE support 6' x 15 reps without UE support   FM walkouts 10# 5 reps ea FWD/BWD 10# 3 reps ea FWD/BWD/ lateral R/L 10# 2 reps ea FWD/BWD/ lateral R/L next   Retro step ups   4\"  3 x10 4\" 3 x10                        MODALITIES                         Other Therapeutic Activities/Education:     Pt received education on their current pathology and how their condition affects functional activities. Pt understands their activity limitations and understands rationale for treatment progression. Pt understood discussion and questions were answered      Home Exercise Program:  5/13/20- heel slides with strap/ mini squat - minl lunges/ SAQ/ heel prop      Manual Treatments:        Modalities:        Communication with other providers:  POC routed for co sign      Assessment:  (Response towards treatment session) (Pain Rating)Pt tolerated tx well without any adverse reactions or complications this date. Pt had minimal difficulty with retro step up activity.  Pt would continue to benefit from skilled therapy interventions to address remaining impairments, improve mobility and strength, finalize HEP,  and progress toward goal completion and prepare for d/c ; end session pain rating 0/10  0/10 pain/ muscle fatigue- no adverse complications with today's session      Plan for Next Session:   carol      Time In / Time Out:0865/6944    Timed Code/Total Treatment Minutes:  21' TE x 9' NRE x1   15' TA x1/ 44'     Next Progress Note due:        Plan of Care Interventions:  [x] Therapeutic Exercise  [] Modalities:  [x] Therapeutic Activity     [] Ultrasound  [] Estim  [x] Gait Training      [] Cervical

## 2020-06-17 ENCOUNTER — HOSPITAL ENCOUNTER (OUTPATIENT)
Dept: PHYSICAL THERAPY | Age: 75
Setting detail: THERAPIES SERIES
Discharge: HOME | End: 2020-06-17
Payer: MEDICARE

## 2020-06-17 PROCEDURE — 97112 NEUROMUSCULAR REEDUCATION: CPT

## 2020-06-17 PROCEDURE — 97110 THERAPEUTIC EXERCISES: CPT

## 2020-06-17 PROCEDURE — 97530 THERAPEUTIC ACTIVITIES: CPT

## 2021-01-04 ENCOUNTER — HOSPITAL ENCOUNTER (OUTPATIENT)
Dept: MAMMOGRAPHY | Age: 76
Discharge: HOME | End: 2021-01-04
Payer: MEDICARE

## 2021-01-04 DIAGNOSIS — Z79.818 USE OF LEUPROLIDE ACETATE (LUPRON): ICD-10-CM

## 2021-01-04 PROCEDURE — 77080 DXA BONE DENSITY AXIAL: CPT

## 2021-06-17 ENCOUNTER — HOSPITAL ENCOUNTER (OUTPATIENT)
Dept: MRI IMAGING | Age: 76
Discharge: HOME | End: 2021-06-17
Payer: MEDICARE

## 2021-06-17 DIAGNOSIS — R26.89 BALANCE PROBLEMS: ICD-10-CM

## 2021-06-17 DIAGNOSIS — C73 THYROID CANCER (HCC): ICD-10-CM

## 2021-06-17 LAB
GFR AFRICAN AMERICAN: >60 ML/MIN/1.73M2
GFR NON-AFRICAN AMERICAN: >60 ML/MIN/1.73M2
POC CREATININE: 0.8 MG/DL (ref 0.9–1.3)

## 2021-06-17 PROCEDURE — 6360000004 HC RX CONTRAST MEDICATION: Performed by: INTERNAL MEDICINE

## 2021-06-17 PROCEDURE — 70553 MRI BRAIN STEM W/O & W/DYE: CPT

## 2021-06-17 PROCEDURE — A9579 GAD-BASE MR CONTRAST NOS,1ML: HCPCS | Performed by: INTERNAL MEDICINE

## 2021-06-17 RX ADMIN — GADOTERIDOL 15 ML: 279.3 INJECTION, SOLUTION INTRAVENOUS at 12:55

## 2021-09-11 ENCOUNTER — HOSPITAL ENCOUNTER (EMERGENCY)
Age: 76
Discharge: HOME | End: 2021-09-11
Attending: EMERGENCY MEDICINE
Payer: MEDICARE

## 2021-09-11 VITALS
SYSTOLIC BLOOD PRESSURE: 173 MMHG | BODY MASS INDEX: 29.82 KG/M2 | WEIGHT: 190 LBS | TEMPERATURE: 98.3 F | RESPIRATION RATE: 16 BRPM | DIASTOLIC BLOOD PRESSURE: 83 MMHG | HEIGHT: 67 IN | OXYGEN SATURATION: 97 % | HEART RATE: 84 BPM

## 2021-09-11 DIAGNOSIS — K62.5 RECTAL BLEEDING: Primary | ICD-10-CM

## 2021-09-11 LAB
BASOPHILS ABSOLUTE: 0 K/CU MM
BASOPHILS RELATIVE PERCENT: 0.2 % (ref 0–1)
DIFFERENTIAL TYPE: ABNORMAL
EOSINOPHILS ABSOLUTE: 0.4 K/CU MM
EOSINOPHILS RELATIVE PERCENT: 7.1 % (ref 0–3)
HCT VFR BLD CALC: 31.1 % (ref 42–52)
HEMOCCULT SP1 STL QL: POSITIVE
HEMOGLOBIN: 10 GM/DL (ref 13.5–18)
IMMATURE NEUTROPHIL %: 0.6 % (ref 0–0.43)
LYMPHOCYTES ABSOLUTE: 1.1 K/CU MM
LYMPHOCYTES RELATIVE PERCENT: 21.2 % (ref 24–44)
MCH RBC QN AUTO: 28.4 PG (ref 27–31)
MCHC RBC AUTO-ENTMCNC: 32.2 % (ref 32–36)
MCV RBC AUTO: 88.4 FL (ref 78–100)
MONOCYTES ABSOLUTE: 0.5 K/CU MM
MONOCYTES RELATIVE PERCENT: 9 % (ref 0–4)
PDW BLD-RTO: 13.5 % (ref 11.7–14.9)
PLATELET # BLD: 354 K/CU MM (ref 140–440)
PMV BLD AUTO: 9.1 FL (ref 7.5–11.1)
RBC # BLD: 3.52 M/CU MM (ref 4.6–6.2)
SEGMENTED NEUTROPHILS ABSOLUTE COUNT: 3.3 K/CU MM
SEGMENTED NEUTROPHILS RELATIVE PERCENT: 61.9 % (ref 36–66)
TOTAL IMMATURE NEUTOROPHIL: 0.03 K/CU MM
WBC # BLD: 5.3 K/CU MM (ref 4–10.5)

## 2021-09-11 PROCEDURE — 82270 OCCULT BLOOD FECES: CPT

## 2021-09-11 PROCEDURE — 85025 COMPLETE CBC W/AUTO DIFF WBC: CPT

## 2021-09-11 PROCEDURE — 99284 EMERGENCY DEPT VISIT MOD MDM: CPT

## 2021-09-12 ASSESSMENT — ENCOUNTER SYMPTOMS
COUGH: 0
BACK PAIN: 0
EYE REDNESS: 0
VOMITING: 0
DIARRHEA: 0
SHORTNESS OF BREATH: 0
BLOOD IN STOOL: 1
RHINORRHEA: 0
SORE THROAT: 0
ABDOMINAL PAIN: 0
CONSTIPATION: 0
NAUSEA: 0

## 2021-09-12 NOTE — ED NOTES
Pt states still having bright red blood stool when coming out of the bathroom.       Dustin Parker RN  09/11/21 8622

## 2021-09-12 NOTE — ED PROVIDER NOTES
Triage Chief Complaint:   Rectal Bleeding (x 3 days bright red blood)    [place]:  Ramya Summers is a 68 y.o. male that presents with bright red blood per rectum whenever he has a bowel movement for the last 3 to 4 days. He has had approximately 3 bowel movement today. He feels that the bleeding did increase some today. He denies any abdominal pain or rectal pain. He denies any lightheadedness, dizziness, shortness of breath or chest pain. He is on optimal aspirin daily but no other blood thinners. No nausea or vomiting. He has never had rectal bleeding previously. Does not believe he has a hemorrhoid. ROS:   Review of Systems   Constitutional: Negative for chills, fatigue and fever. HENT: Negative for congestion, rhinorrhea and sore throat. Eyes: Negative for redness and visual disturbance. Respiratory: Negative for cough and shortness of breath. Cardiovascular: Negative for chest pain and leg swelling. Gastrointestinal: Positive for blood in stool. Negative for abdominal pain, constipation, diarrhea, nausea and vomiting. Genitourinary: Negative for dysuria and frequency. Musculoskeletal: Negative for arthralgias and back pain. Skin: Negative for rash and wound. Neurological: Negative for dizziness, syncope, weakness, light-headedness and headaches. Psychiatric/Behavioral: Negative for hallucinations and suicidal ideas.        Past Medical History:   Diagnosis Date    Arthritis     BPH (benign prostatic hyperplasia)     CPAP (continuous positive airway pressure) dependence     Diabetes mellitus ([address]     dx 2012    Erectile dysfunction     Hyperlipidemia     Sleep apnea     had sleep study done 2015=- uses cpap nightly    Thyroid cancer ([facility] 06/2018    Total thyroidectomy     Wears dentures     upper    Wears glasses     \"just to read\"     Past Surgical History:   Procedure Laterality Date    APPENDECTOMY  2006    CARPAL TUNNEL RELEASE Right 2014    CATARACT REMOVAL Bilateral 2015    CHOLECYSTECTOMY  10/11/2018    COLONOSCOPY      COLONOSCOPY  2013    EYE SURGERY Right     cataract extraction with implant/ fritz \"eyelid lift-     HERNIA REPAIR Right 's    inguinal    LEG MUSCLE SURGERY Left 2020    LEFT QUADRICEPS REPAIR performed by Olegario Hyde DO at [address] [facility] N/A 10/11/2018    CHOLECYSTECTOMY LAPAROSCOPIC performed by Brenda Sena MD at [place] [id number] Left 2020     Repair of left quadriceps tendon using F[employer]    ROTATOR CUFF REPAIR Left      Family History   Problem Relation Age of Onset    Heart Disease Mother     Stroke Mother     Stroke Father     High Blood Pressure Father      Social History     Socioeconomic History    Marital status:      Spouse name: Not on file    Number of children: Not on file    Years of education: Not on file    Highest education level: Not on file   Occupational History    Not on file   Tobacco Use    Smoking status: Former Smoker     Types: Cigarettes     Quit date: 1996     Years since quittin.0    Smokeless tobacco: Never Used    Tobacco comment: \"use the vapor cigarette occ not on regular basis\"   Vaping Use    Vaping Use: Every day    Substances: Always   Substance and Sexual Activity    Alcohol use: No     Comment: \"use to drink in regular basis- quit age 42's   [place] Drug use: No    Sexual activity: Yes     Partners: Female   Other Topics Concern    Not on file   Social History Narrative    Not on file     Social Determinants of Health     Financial Resource Strain:     Difficulty of Paying Living Expenses:    Food Insecurity:     Worried About Running Out of Food in the Last Year:     Ran Out of Food in the Last Year:    Transportation Needs:     Lack of Transportation (Medical):      Lack of Transportation (Non-Medical):    Physical Activity:     Days of Exercise per Week:     Minutes of Exercise per Session:    Stress:     Feeling of Stress :    Social Connections:     Frequency of Communication with Friends and Family:     Frequency of Social Gatherings with Friends and Family:     Attends [employer] Services:     Active Member of Clubs or Organizations:     Attends Club or Organization Meetings:     Marital Status:    Intimate Partner Violence:     Fear of Current or Ex-Partner:     Emotionally Abused:     Physically Abused:     Sexually Abused:      No current facility-administered medications for this encounter. Current Outpatient Medications   Medication Sig Dispense Refill    metFORMIN (GLUCOPHAGE) 500 MG tablet TAKE 1 TABLET BY MOUTH IN THE MORNING AND 1 WITH EVENING MEAL      citalopram (CELEXA) 20 MG tablet       traMADol (ULTRAM) 50 MG tablet Take 50 mg by mouth every 4 hours as needed.  CALCIUM PO Take 500 mg by mouth      cyclobenzaprine (FLEXERIL) 10 MG tablet TAKE 1 TABLET BY MOUTH AT BEDTIME AS NEEDED      cetirizine (ZYRTEC) 10 MG tablet Take by mouth      traZODone (DESYREL) 50 MG tablet Take 50 mg by mouth nightly      Magnesium Oxide 400 MG CAPS Take by mouth      pseudoephedrine (SUDAFED) 30 MG tablet Take by mouth PRN      Multiple Vitamins-Minerals (FITNESS TABS FOR MEN AM/PM PO) Take by mouth      Omega-3 Fatty Acids (OMEGA-3 FISH OIL) 1000 MG CAPS Take by mouth      fluticasone (FLONASE) 50 MCG/ACT nasal spray 1 spray by Each Nare route daily      diphenhydrAMINE (BENADRYL) 25 MG tablet Take 25 mg by mouth every 6 hours as needed for Itching      levothyroxine (SYNTHROID) 150 MCG tablet Take 150 mcg by mouth daily      calcium 600 MG TABS tablet Take 500 mg by mouth daily 1800 total      melatonin 3 MG TABS tablet Take 3 mg by mouth daily      glipiZIDE (GLUCOTROL) 10 MG tablet Take 10 mg by mouth every morning (before breakfast)       gemfibrozil (LOPID) 600 MG tablet Take 600 mg by mouth 2 times daily (before meals).       simvastatin (ZOCOR) 40 MG tablet Take 40 mg by mouth nightly. Allergies   Allergen Reactions    Seasonal      Dust, dander,pollen    Ciprofloxacin Hives    Pcn [Penicillins] Hives       Nursing Notes Reviewed     Physical Exam:   ED Triage Vitals [09/11/21 1839]   Enc Vitals Group      BP (!) 199/84      Pulse 97      Resp 18      Temp 98.3 °F (36.8 °C)      Temp Source Oral      SpO2 95 %      Weight 190 lb (86.2 kg)      Height 5' 7\" (1.702 m)      Head Circumference       Peak Flow       Pain Score       Pain Loc       Pain Edu? Excl. in [address]? BP (!) 173/83   Pulse 84   Temp 98.3 °F (36.8 °C) (Oral)   Resp 16   Ht 5' 7\" (1.702 m)   Wt 190 lb (86.2 kg)   SpO2 97%   BMI 29.76 kg/m²   My pulse ox interpretation is - normal  Physical Exam  Exam conducted with a chaperone present (Nurse, Bessie Bryant). Constitutional:       General: He is not in acute distress. Appearance: Normal appearance. He is not ill-appearing or diaphoretic. HENT:      Head: Normocephalic and atraumatic. Eyes:      General:         Right eye: No discharge. Left eye: No discharge. Conjunctiva/sclera: Conjunctivae normal.   Cardiovascular:      Rate and Rhythm: Normal rate and regular rhythm. Pulses: Normal pulses. Radial pulses are 2+ on the right side and 2+ on the left side. Pulmonary:      Effort: Pulmonary effort is normal. No respiratory distress. Breath sounds: Normal breath sounds. No wheezing or rales. Abdominal:      General: There is no distension. Tenderness: There is no abdominal tenderness. There is no guarding or rebound. Genitourinary:     Rectum: Guaiac result positive. Musculoskeletal:         General: No swelling or tenderness. Normal range of motion. Skin:     General: Skin is warm and dry. Neurological:      General: No focal deficit present. Mental Status: He is alert. Cranial Nerves: No cranial nerve deficit. Motor: No weakness.    Psychiatric: Mood and Affect: Mood normal.         Behavior: Behavior normal.         I have reviewed and interpreted all of the currently available lab results from this visit (if applicable):  Results for orders placed or performed during the hospital encounter of 09/11/21   CBC auto diff   Result Value Ref Range    WBC 5.3 4.0 - 10.5 K/CU MM    RBC 3.52 (L) 4.6 - 6.2 M/CU MM    Hemoglobin 10.0 (L) 13.5 - 18.0 GM/DL    Hematocrit 31.1 (L) 42 - 52 %    MCV 88.4 78 - 100 FL    MCH 28.4 27 - 31 PG    MCHC 32.2 32.0 - 36.0 %    RDW 13.5 11.7 - 14.9 %    Platelets 970 708 - 990 K/CU MM    MPV 9.1 7.5 - 11.1 FL    Differential Type AUTOMATED DIFFERENTIAL     Segs Relative 61.9 36 - 66 %    Lymphocytes % 21.2 (L) 24 - 44 %    Monocytes % 9.0 (H) 0 - 4 %    Eosinophils % 7.1 (H) 0 - 3 %    Basophils % 0.2 0 - 1 %    Segs Absolute 3.3 K/CU MM    Lymphocytes Absolute 1.1 K/CU MM    Monocytes Absolute 0.5 K/CU MM    Eosinophils Absolute 0.4 K/CU MM    Basophils Absolute 0.0 K/CU MM    Immature Neutrophil % 0.6 (H) 0 - 0.43 %    Total Immature Neutrophil 0.03 K/CU MM   Blood occult stool screen #1   Result Value Ref Range    Occult Blood, Stool #1 POSITIVE (A) NEGATIVE      Radiographs (if obtained):  [] The following radiograph was interpreted by myself in the absence of a radiologist:  [x]Radiologist's Report Reviewed:  No orders to display         EKG (if obtained): (All EKG's are interpreted by myself in the absence of a cardiologist)    MDM:  Differential diagnoses considered include but are not limited to upper versus lower GI bleed, bleeding diverticula, bleeding hemorrhoid, anemia. Patient arrives well-appearing and in no acute distress with normal vital signs. No abdominal pain. His guaiac is positive but no overt blood on rectal exam.  Patient's hemoglobin level is 10. Last blood work in the computer is from over 2 years ago.   His hemoglobin is decreased from previous but is unknown how much of this has been in the last few days. His he does not have any symptoms of acute blood loss anemia and his vital signs are unremarkable, I do not suspect an emergent hemorrhage. Patient would prefer to be discharged and follow-up as an outpatient. I believe this is reasonable given that she appears stable at this time. I did discuss signs and symptoms which would warrant an emergent return visit to the emergency department with patient and his wife. I also recommended that he stop taking his aspirin daily while he is having the bleeding. He will follow-up closely with his primary care physician and GI physician next week. We will discharge patient home in stable condition. Plan of care explained to patient. Concerning signs and symptoms warranting a return visit to the Emergency Department were explained in detail. All questions and concerns were addressed to the patient's satisfaction. Patient understood and agreed with plan. I did don appropriate PPE (including face mask, protective eye ware/safety glasses and gloves), as recommended by the health facility/national standard best practice, during my bedside interactions with the patient. The likelihood of other entities in the differential is insufficient to justify any further testing for them. This was explained to the patient. The patient was advised that persistent or worsening symptoms would requirefurther evaluation. Clinical Impression:  1. Rectal bleeding          Cathy Lombardo MD       Please note that portions of this note may have been complete with a voice recognition program.  Effortswere made to edit the dictations, but occasional words are mis-transcribed.           Cathy Lombardo MD  09/12/21 5573

## 2021-12-03 ENCOUNTER — HOSPITAL ENCOUNTER (OUTPATIENT)
Age: 76
Setting detail: OBSERVATION
Discharge: ANOTHER HOSPITAL | End: 2021-12-04
Attending: EMERGENCY MEDICINE | Admitting: INTERNAL MEDICINE
Payer: MEDICARE

## 2021-12-03 DIAGNOSIS — K62.5 RECTAL BLEEDING: Primary | ICD-10-CM

## 2021-12-03 PROBLEM — D62 ACUTE BLOOD LOSS ANEMIA: Status: ACTIVE | Noted: 2021-12-03

## 2021-12-03 LAB
ALBUMIN SERPL-MCNC: 3.9 GM/DL (ref 3.4–5)
ALP BLD-CCNC: 65 IU/L (ref 40–129)
ALT SERPL-CCNC: 36 U/L (ref 10–40)
ANION GAP SERPL CALCULATED.3IONS-SCNC: 11 MMOL/L (ref 4–16)
APTT: 26.9 SECONDS (ref 25.1–37.1)
AST SERPL-CCNC: 47 IU/L (ref 15–37)
BASOPHILS ABSOLUTE: 0 K/CU MM
BASOPHILS RELATIVE PERCENT: 0.2 % (ref 0–1)
BILIRUB SERPL-MCNC: 0.2 MG/DL (ref 0–1)
BUN BLDV-MCNC: 13 MG/DL (ref 6–23)
CALCIUM SERPL-MCNC: 7.9 MG/DL (ref 8.3–10.6)
CHLORIDE BLD-SCNC: 94 MMOL/L (ref 99–110)
CO2: 26 MMOL/L (ref 21–32)
CREAT SERPL-MCNC: 0.8 MG/DL (ref 0.9–1.3)
DIFFERENTIAL TYPE: ABNORMAL
EOSINOPHILS ABSOLUTE: 0.1 K/CU MM
EOSINOPHILS RELATIVE PERCENT: 1.9 % (ref 0–3)
FERRITIN: 7 NG/ML (ref 30–400)
GFR AFRICAN AMERICAN: >60 ML/MIN/1.73M2
GFR NON-AFRICAN AMERICAN: >60 ML/MIN/1.73M2
GLUCOSE BLD-MCNC: 280 MG/DL (ref 70–99)
HCT VFR BLD CALC: 19.5 % (ref 42–52)
HEMOGLOBIN: 5.4 GM/DL (ref 13.5–18)
IMMATURE NEUTROPHIL %: 0.5 % (ref 0–0.43)
INR BLD: 1.02 INDEX
IRON: 206 UG/DL (ref 59–158)
LYMPHOCYTES ABSOLUTE: 0.7 K/CU MM
LYMPHOCYTES RELATIVE PERCENT: 11.6 % (ref 24–44)
MCH RBC QN AUTO: 19.2 PG (ref 27–31)
MCHC RBC AUTO-ENTMCNC: 27.7 % (ref 32–36)
MCV RBC AUTO: 69.4 FL (ref 78–100)
MONOCYTES ABSOLUTE: 0.4 K/CU MM
MONOCYTES RELATIVE PERCENT: 7.2 % (ref 0–4)
PCT TRANSFERRIN: 48 % (ref 10–44)
PDW BLD-RTO: 18.3 % (ref 11.7–14.9)
PLATELET # BLD: 436 K/CU MM (ref 140–440)
PMV BLD AUTO: 9.3 FL (ref 7.5–11.1)
POTASSIUM SERPL-SCNC: 4.5 MMOL/L (ref 3.5–5.1)
PROTHROMBIN TIME: 12.7 SECONDS (ref 11.7–14.5)
RBC # BLD: 2.81 M/CU MM (ref 4.6–6.2)
SEGMENTED NEUTROPHILS ABSOLUTE COUNT: 4.7 K/CU MM
SEGMENTED NEUTROPHILS RELATIVE PERCENT: 78.6 % (ref 36–66)
SODIUM BLD-SCNC: 131 MMOL/L (ref 135–145)
TOTAL IMMATURE NEUTOROPHIL: 0.03 K/CU MM
TOTAL IRON BINDING CAPACITY: 428 UG/DL (ref 250–450)
TOTAL PROTEIN: 6.5 GM/DL (ref 6.4–8.2)
UNSATURATED IRON BINDING CAPACITY: 222 UG/DL (ref 110–370)
WBC # BLD: 5.9 K/CU MM (ref 4–10.5)

## 2021-12-03 PROCEDURE — 83540 ASSAY OF IRON: CPT

## 2021-12-03 PROCEDURE — 85730 THROMBOPLASTIN TIME PARTIAL: CPT

## 2021-12-03 PROCEDURE — 86922 COMPATIBILITY TEST ANTIGLOB: CPT

## 2021-12-03 PROCEDURE — 85025 COMPLETE CBC W/AUTO DIFF WBC: CPT

## 2021-12-03 PROCEDURE — 82728 ASSAY OF FERRITIN: CPT

## 2021-12-03 PROCEDURE — 86901 BLOOD TYPING SEROLOGIC RH(D): CPT

## 2021-12-03 PROCEDURE — 86900 BLOOD TYPING SEROLOGIC ABO: CPT

## 2021-12-03 PROCEDURE — 99285 EMERGENCY DEPT VISIT HI MDM: CPT

## 2021-12-03 PROCEDURE — 85610 PROTHROMBIN TIME: CPT

## 2021-12-03 PROCEDURE — 80053 COMPREHEN METABOLIC PANEL: CPT

## 2021-12-03 PROCEDURE — 1200000000 HC SEMI PRIVATE

## 2021-12-03 PROCEDURE — 83550 IRON BINDING TEST: CPT

## 2021-12-03 PROCEDURE — G0378 HOSPITAL OBSERVATION PER HR: HCPCS

## 2021-12-03 PROCEDURE — 86850 RBC ANTIBODY SCREEN: CPT

## 2021-12-03 RX ORDER — TAMSULOSIN HYDROCHLORIDE 0.4 MG/1
0.4 CAPSULE ORAL DAILY
COMMUNITY
Start: 2021-04-21

## 2021-12-03 RX ORDER — GEMFIBROZIL 600 MG/1
600 TABLET, FILM COATED ORAL 2 TIMES DAILY
Status: ON HOLD | COMMUNITY
Start: 2021-01-05 | End: 2021-12-08 | Stop reason: HOSPADM

## 2021-12-03 RX ORDER — CITALOPRAM 20 MG/1
TABLET ORAL
COMMUNITY
Start: 2020-01-30

## 2021-12-03 RX ORDER — ROPINIROLE 1 MG/1
1 TABLET, FILM COATED ORAL 3 TIMES DAILY
COMMUNITY
Start: 2021-12-03 | End: 2022-01-02

## 2021-12-03 RX ORDER — POLYETHYLENE GLYCOL 3350 17 G/17G
17 POWDER, FOR SOLUTION ORAL DAILY
COMMUNITY

## 2021-12-03 RX ORDER — SODIUM CHLORIDE 9 MG/ML
INJECTION, SOLUTION INTRAVENOUS PRN
Status: DISCONTINUED | OUTPATIENT
Start: 2021-12-03 | End: 2021-12-04 | Stop reason: HOSPADM

## 2021-12-03 RX ORDER — TRAZODONE HYDROCHLORIDE 100 MG/1
TABLET ORAL
COMMUNITY
Start: 2021-09-07 | End: 2021-12-04

## 2021-12-03 RX ORDER — FERROUS SULFATE 325(65) MG
325 TABLET ORAL DAILY
COMMUNITY
Start: 2021-12-03 | End: 2022-03-03

## 2021-12-03 RX ORDER — FINASTERIDE 5 MG/1
5 TABLET, FILM COATED ORAL DAILY
COMMUNITY
Start: 2021-04-21

## 2021-12-03 RX ORDER — CETIRIZINE HYDROCHLORIDE 10 MG/1
TABLET ORAL
Status: ON HOLD | COMMUNITY
End: 2021-12-08 | Stop reason: HOSPADM

## 2021-12-03 RX ORDER — LEVOTHYROXINE SODIUM 0.15 MG/1
TABLET ORAL
COMMUNITY
Start: 2018-11-14

## 2021-12-03 RX ORDER — TRAMADOL HYDROCHLORIDE 50 MG/1
TABLET ORAL
COMMUNITY
Start: 2016-06-25 | End: 2021-12-04

## 2021-12-03 NOTE — ED PROVIDER NOTES
Triage Chief Complaint:   Rectal Bleeding (Pt sent from doctor office for hemoglobin of 5- reports rectal bleeding for one month, w/ increase in last 24 hours   )      [place]:  Myriam Servin is a 68 y.o. male that presents to the emergency department with low hemoglobin. Patient states that he has been having rectal bleeding for the past 3 months. He sees Dr Dwight Jiménez from GI. He states that he was diagnosed with radiation proctitis. He states they attempted to do a colonoscopy but were unable to due to the swelling. He states that Dr Dwight Jiménez does not even want to attempt a colonoscopy until the spring. Patient states that he does have some dizziness when he stands up but that is been an ongoing issue. He followed up with his primary care physician today and had blood work done. His hemoglobin came back at 5.1. He was instructed to come to the emergency department. .    Past Medical History:   Diagnosis Date    Arthritis     BPH (benign prostatic hyperplasia)     CPAP (continuous positive airway pressure) dependence     Diabetes mellitus ([address]     dx 2012    Erectile dysfunction     Hyperlipidemia     Sleep apnea     had sleep study done 2015=- uses cpap nightly    Thyroid cancer ([address] 06/2018    Total thyroidectomy     Wears dentures     upper    Wears glasses     \"just to read\"     Past Surgical History:   Procedure Laterality Date    APPENDECTOMY  2006    CARPAL TUNNEL RELEASE Right 2014    CATARACT REMOVAL Bilateral 12/01/2015    CHOLECYSTECTOMY  10/11/2018    COLONOSCOPY      COLONOSCOPY  04/26/2013    EYE SURGERY Right     cataract extraction with implant/ fritz \"eyelid lift- 2015    HERNIA REPAIR Right 1990's    inguinal    LEG MUSCLE SURGERY Left 2/27/2020    LEFT QUADRICEPS REPAIR performed by Alie Inman DO at [address] N/A 10/11/2018    CHOLECYSTECTOMY LAPAROSCOPIC performed by [facility] Parsons MD at [facility] Left 02/27/2020 Partner Violence:     Fear of Current or Ex-Partner: Not on file    Emotionally Abused: Not on file    Physically Abused: Not on file    Sexually Abused: Not on file   Housing Stability:     Unable to Pay for Housing in the Last Year: Not on file    Number of [place] in the Last Year: Not on file    Unstable Housing in the Last Year: Not on file     No current facility-administered medications for this encounter. Current Outpatient Medications   Medication Sig Dispense Refill    rOPINIRole (REQUIP) 1 MG tablet Take 1 mg by mouth 3 times daily      finasteride (PROSCAR) 5 MG tablet Take 5 mg by mouth daily      ferrous sulfate (IRON 325) 325 (65 Fe) MG tablet Take 325 mg by mouth daily      tamsulosin (FLOMAX) 0.4 MG capsule Take 0.4 mg by mouth daily      citalopram (CELEXA) 20 MG tablet TAKE 1 TABLET BY MOUTH  DAILY WITH DINNER      levothyroxine (SYNTHROID) 150 MCG tablet 1 tab a day for 6 days a week and 1.5 tab on Sundays.  gemfibrozil (LOPID) 600 MG tablet Take 600 mg by mouth 2 times daily      metFORMIN (GLUCOPHAGE) 500 MG tablet TAKE 1 TABLET BY MOUTH  TWICE DAILY      traMADol (ULTRAM) 50 MG tablet Take by mouth.  leuprolide (LUPRON) 45 MG injection Inject into the muscle      polyethylene glycol (GLYCOLAX) 17 GM/SCOOP powder Take 17 g by mouth daily      cetirizine (ZYRTEC) 10 MG tablet Take by mouth      traZODone (DESYREL) 100 MG tablet       metFORMIN (GLUCOPHAGE) 500 MG tablet TAKE 1 TABLET BY MOUTH IN THE MORNING AND 1 WITH EVENING MEAL      citalopram (CELEXA) 20 MG tablet       traMADol (ULTRAM) 50 MG tablet Take 50 mg by mouth every 4 hours as needed.       CALCIUM PO Take 500 mg by mouth      cyclobenzaprine (FLEXERIL) 10 MG tablet TAKE 1 TABLET BY MOUTH AT BEDTIME AS NEEDED      cetirizine (ZYRTEC) 10 MG tablet Take by mouth      traZODone (DESYREL) 50 MG tablet Take 50 mg by mouth nightly      Magnesium Oxide 400 MG CAPS Take by mouth      pseudoephedrine (SUDAFED) 30 MG tablet Take by mouth PRN      Multiple Vitamins-Minerals (FITNESS TABS FOR MEN AM/PM PO) Take by mouth      Omega-3 Fatty Acids (OMEGA-3 FISH OIL) 1000 MG CAPS Take by mouth      fluticasone (FLONASE) 50 MCG/ACT nasal spray 1 spray by Each Nare route daily      diphenhydrAMINE (BENADRYL) 25 MG tablet Take 25 mg by mouth every 6 hours as needed for Itching      levothyroxine (SYNTHROID) 150 MCG tablet Take 150 mcg by mouth daily      calcium 600 MG TABS tablet Take 500 mg by mouth daily 1800 total      melatonin 3 MG TABS tablet Take 3 mg by mouth daily      glipiZIDE (GLUCOTROL) 10 MG tablet Take 10 mg by mouth every morning (before breakfast)       gemfibrozil (LOPID) 600 MG tablet Take 600 mg by mouth 2 times daily (before meals).  simvastatin (ZOCOR) 40 MG tablet Take 40 mg by mouth nightly. Allergies   Allergen Reactions    Seasonal      Dust, dander,pollen    Ciprofloxacin Hives    Pcn [Penicillins] Hives     Nursing Notes Reviewed    ROS:  At least 10 systems reviewed and otherwise negative except as in the [address] Physical Exam:  ED Triage Vitals [12/03/21 1728]   Enc Vitals Group      BP (!) 160/71      Pulse 101      Resp 20      Temp 97.9 °F (36.6 °C)      Temp Source Oral      SpO2 97 %      Weight       Height       Head Circumference       Peak Flow       Pain Score       Pain Loc       Pain Edu? Excl. in [address]? My pulse oximetry interpretation is which is within the normal range    GENERAL APPEARANCE: Awake and alert. Cooperative. No acute distress. HEAD:  Atraumatic. EYES: EOM's grossly intact. Pale conjunctive a  ENT: Mucous membranes are moist.  No trismus. NECK:  Trachea midline. HEART: RRR. Radial pulses 2+. LUNGS: Respirations unlabored. CTAB  ABDOMEN: Soft. Non-tender. No guarding or rebound. EXTREMITIES: No acute deformities. SKIN: Warm and dry. Pale skin  NEUROLOGICAL: No gross facial drooping.  Moves all 4 extremities spontaneously. PSYCHIATRIC: Normal mood. I have reviewed and interpreted all of the currently available lab results from this visit (if applicable):  Results for orders placed or performed during the hospital encounter of 12/03/21   CBC Auto Differential   Result Value Ref Range    WBC 5.9 4.0 - 10.5 K/CU MM    RBC 2.81 (L) 4.6 - 6.2 M/CU MM    Hemoglobin 5.4 (LL) 13.5 - 18.0 GM/DL    Hematocrit 19.5 (L) 42 - 52 %    MCV 69.4 (L) 78 - 100 FL    MCH 19.2 (L) 27 - 31 PG    MCHC 27.7 (L) 32.0 - 36.0 %    RDW 18.3 (H) 11.7 - 14.9 %    Platelets 337 762 - 406 K/CU MM    MPV 9.3 7.5 - 11.1 FL    Differential Type AUTOMATED DIFFERENTIAL     Segs Relative 78.6 (H) 36 - 66 %    Lymphocytes % 11.6 (L) 24 - 44 %    Monocytes % 7.2 (H) 0 - 4 %    Eosinophils % 1.9 0 - 3 %    Basophils % 0.2 0 - 1 %    Segs Absolute 4.7 K/CU MM    Lymphocytes Absolute 0.7 K/CU MM    Monocytes Absolute 0.4 K/CU MM    Eosinophils Absolute 0.1 K/CU MM    Basophils Absolute 0.0 K/CU MM    Immature Neutrophil % 0.5 (H) 0 - 0.43 %    Total Immature Neutrophil 0.03 K/CU MM   Comprehensive Metabolic Panel w/ Reflex to MG   Result Value Ref Range    Sodium 131 (L) 135 - 145 MMOL/L    Potassium 4.5 3.5 - 5.1 MMOL/L    Chloride 94 (L) 99 - 110 mMol/L    CO2 26 21 - 32 MMOL/L    BUN 13 6 - 23 MG/DL    CREATININE 0.8 (L) 0.9 - 1.3 MG/DL    Glucose 280 (H) 70 - 99 MG/DL    Calcium 7.9 (L) 8.3 - 10.6 MG/DL    Albumin 3.9 3.4 - 5.0 GM/DL    Total Protein 6.5 6.4 - 8.2 GM/DL    Total Bilirubin 0.2 0.0 - 1.0 MG/DL    ALT 36 10 - 40 U/L    AST 47 (H) 15 - 37 IU/L    Alkaline Phosphatase 65 40 - 129 IU/L    GFR Non-African American >60 >60 mL/min/1.73m2    GFR African American >60 >60 mL/min/1.73m2    Anion Gap 11 4 - 16          EKG: (All EKG's are interpreted by myself in the absence of a cardiologist)      MDM:  Patient's vital signs are stable. He is awake alert in no acute distress.   He states that when he sits down he has no symptoms but it is when he walks that he gets dizzy and unstable on his feet. His hemoglobin here is 5.4. We do have a known diagnosis of radiation proctitis from his GI physician. I spoke with Chloe Cervantes, the nurse practitioner at [facility].  She is agreeable with plan for admission for transfusion. Patient and his wife are agreeable with this plan as well. 30 minutes of critical care time was spent with this patient. This excludes time spent on separate billable procedures. Clinical Impression:  1. Rectal bleeding        Disposition Vitals:  [unfilled], [unfilled], [unfilled], [unfilled]    Disposition referral (if applicable):  No follow-up provider specified.     Disposition medications (if applicable):  New Prescriptions    No medications on file         (Please note that portions of this note may have been completed with a voice recognition program. Efforts were made to edit the dictations but occasionally words are mis-transcribed.)    MD Joe Timmons MD  12/03/21 5645

## 2021-12-04 ENCOUNTER — HOSPITAL ENCOUNTER (INPATIENT)
Age: 76
LOS: 4 days | Discharge: HOME | DRG: 394 | End: 2021-12-08
Attending: INTERNAL MEDICINE | Admitting: INTERNAL MEDICINE
Payer: MEDICARE

## 2021-12-04 VITALS
RESPIRATION RATE: 16 BRPM | HEART RATE: 76 BPM | TEMPERATURE: 97.6 F | OXYGEN SATURATION: 99 % | WEIGHT: 190 LBS | SYSTOLIC BLOOD PRESSURE: 164 MMHG | BODY MASS INDEX: 29.76 KG/M2 | DIASTOLIC BLOOD PRESSURE: 72 MMHG

## 2021-12-04 PROBLEM — K92.2 GI BLEED: Status: ACTIVE | Noted: 2021-12-04

## 2021-12-04 LAB
ANION GAP SERPL CALCULATED.3IONS-SCNC: 11 MMOL/L (ref 4–16)
BASOPHILS ABSOLUTE: 0 K/CU MM
BASOPHILS RELATIVE PERCENT: 0.3 % (ref 0–1)
BUN BLDV-MCNC: 8 MG/DL (ref 6–23)
CALCIUM SERPL-MCNC: 7.8 MG/DL (ref 8.3–10.6)
CHLORIDE BLD-SCNC: 99 MMOL/L (ref 99–110)
CO2: 25 MMOL/L (ref 21–32)
CREAT SERPL-MCNC: 0.7 MG/DL (ref 0.9–1.3)
DIFFERENTIAL TYPE: ABNORMAL
EOSINOPHILS ABSOLUTE: 0.2 K/CU MM
EOSINOPHILS RELATIVE PERCENT: 3.5 % (ref 0–3)
GFR AFRICAN AMERICAN: >60 ML/MIN/1.73M2
GFR NON-AFRICAN AMERICAN: >60 ML/MIN/1.73M2
GLUCOSE BLD-MCNC: 175 MG/DL (ref 70–99)
GLUCOSE BLD-MCNC: 205 MG/DL (ref 70–99)
GLUCOSE BLD-MCNC: 217 MG/DL (ref 70–99)
GLUCOSE BLD-MCNC: 272 MG/DL
GLUCOSE BLD-MCNC: 272 MG/DL (ref 70–99)
HCT VFR BLD CALC: 28.6 % (ref 42–52)
HEMOGLOBIN: 8.3 GM/DL (ref 13.5–18)
IMMATURE NEUTROPHIL %: 0.5 % (ref 0–0.43)
LYMPHOCYTES ABSOLUTE: 1.1 K/CU MM
LYMPHOCYTES RELATIVE PERCENT: 17.1 % (ref 24–44)
MCH RBC QN AUTO: 22.1 PG (ref 27–31)
MCHC RBC AUTO-ENTMCNC: 29 % (ref 32–36)
MCV RBC AUTO: 76.1 FL (ref 78–100)
MONOCYTES ABSOLUTE: 0.6 K/CU MM
MONOCYTES RELATIVE PERCENT: 8.8 % (ref 0–4)
PDW BLD-RTO: 23.3 % (ref 11.7–14.9)
PLATELET # BLD: 382 K/CU MM (ref 140–440)
PMV BLD AUTO: 10.1 FL (ref 7.5–11.1)
POTASSIUM SERPL-SCNC: 4.3 MMOL/L (ref 3.5–5.1)
RBC # BLD: 3.76 M/CU MM (ref 4.6–6.2)
SEGMENTED NEUTROPHILS ABSOLUTE COUNT: 4.5 K/CU MM
SEGMENTED NEUTROPHILS RELATIVE PERCENT: 69.8 % (ref 36–66)
SODIUM BLD-SCNC: 135 MMOL/L (ref 135–145)
TOTAL IMMATURE NEUTOROPHIL: 0.03 K/CU MM
WBC # BLD: 6.5 K/CU MM (ref 4–10.5)

## 2021-12-04 PROCEDURE — C9113 INJ PANTOPRAZOLE SODIUM, VIA: HCPCS | Performed by: PHYSICIAN ASSISTANT

## 2021-12-04 PROCEDURE — 4500000002 HC ER NO CHARGE

## 2021-12-04 PROCEDURE — 96374 THER/PROPH/DIAG INJ IV PUSH: CPT

## 2021-12-04 PROCEDURE — 86901 BLOOD TYPING SEROLOGIC RH(D): CPT

## 2021-12-04 PROCEDURE — 86850 RBC ANTIBODY SCREEN: CPT

## 2021-12-04 PROCEDURE — 82962 GLUCOSE BLOOD TEST: CPT

## 2021-12-04 PROCEDURE — G0378 HOSPITAL OBSERVATION PER HR: HCPCS

## 2021-12-04 PROCEDURE — 1200000000 HC SEMI PRIVATE

## 2021-12-04 PROCEDURE — 2580000003 HC RX 258: Performed by: PHYSICIAN ASSISTANT

## 2021-12-04 PROCEDURE — 99284 EMERGENCY DEPT VISIT MOD MDM: CPT

## 2021-12-04 PROCEDURE — 6370000000 HC RX 637 (ALT 250 FOR IP): Performed by: NURSE PRACTITIONER

## 2021-12-04 PROCEDURE — 6370000000 HC RX 637 (ALT 250 FOR IP): Performed by: PHYSICIAN ASSISTANT

## 2021-12-04 PROCEDURE — 6370000000 HC RX 637 (ALT 250 FOR IP): Performed by: EMERGENCY MEDICINE

## 2021-12-04 PROCEDURE — 85025 COMPLETE CBC W/AUTO DIFF WBC: CPT

## 2021-12-04 PROCEDURE — 85018 HEMOGLOBIN: CPT

## 2021-12-04 PROCEDURE — 86900 BLOOD TYPING SEROLOGIC ABO: CPT

## 2021-12-04 PROCEDURE — P9016 RBC LEUKOCYTES REDUCED: HCPCS

## 2021-12-04 PROCEDURE — 6360000002 HC RX W HCPCS: Performed by: PHYSICIAN ASSISTANT

## 2021-12-04 PROCEDURE — 36430 TRANSFUSION BLD/BLD COMPNT: CPT

## 2021-12-04 PROCEDURE — 85014 HEMATOCRIT: CPT

## 2021-12-04 PROCEDURE — 80048 BASIC METABOLIC PNL TOTAL CA: CPT

## 2021-12-04 RX ORDER — LANOLIN ALCOHOL/MO/W.PET/CERES
400 CREAM (GRAM) TOPICAL DAILY
Status: DISCONTINUED | OUTPATIENT
Start: 2021-12-04 | End: 2021-12-04 | Stop reason: HOSPADM

## 2021-12-04 RX ORDER — CYCLOBENZAPRINE HCL 5 MG
5 TABLET ORAL NIGHTLY PRN
Status: DISCONTINUED | OUTPATIENT
Start: 2021-12-04 | End: 2021-12-04 | Stop reason: HOSPADM

## 2021-12-04 RX ORDER — TAMSULOSIN HYDROCHLORIDE 0.4 MG/1
0.4 CAPSULE ORAL DAILY
Status: DISCONTINUED | OUTPATIENT
Start: 2021-12-04 | End: 2021-12-08 | Stop reason: HOSPADM

## 2021-12-04 RX ORDER — LANOLIN ALCOHOL/MO/W.PET/CERES
3 CREAM (GRAM) TOPICAL DAILY
Status: DISCONTINUED | OUTPATIENT
Start: 2021-12-04 | End: 2021-12-08 | Stop reason: HOSPADM

## 2021-12-04 RX ORDER — FLUTICASONE PROPIONATE 50 MCG
1 SPRAY, SUSPENSION (ML) NASAL DAILY
Status: DISCONTINUED | OUTPATIENT
Start: 2021-12-04 | End: 2021-12-04 | Stop reason: HOSPADM

## 2021-12-04 RX ORDER — CALCIUM CARBONATE 500(1250)
500 TABLET ORAL DAILY
Status: DISCONTINUED | OUTPATIENT
Start: 2021-12-04 | End: 2021-12-04 | Stop reason: HOSPADM

## 2021-12-04 RX ORDER — ACETAMINOPHEN 650 MG/1
650 SUPPOSITORY RECTAL EVERY 6 HOURS PRN
Status: DISCONTINUED | OUTPATIENT
Start: 2021-12-04 | End: 2021-12-08 | Stop reason: HOSPADM

## 2021-12-04 RX ORDER — TRAMADOL HYDROCHLORIDE 50 MG/1
50 TABLET ORAL EVERY 4 HOURS PRN
Status: DISCONTINUED | OUTPATIENT
Start: 2021-12-04 | End: 2021-12-04 | Stop reason: HOSPADM

## 2021-12-04 RX ORDER — DEXTROSE MONOHYDRATE 50 MG/ML
100 INJECTION, SOLUTION INTRAVENOUS PRN
Status: DISCONTINUED | OUTPATIENT
Start: 2021-12-04 | End: 2021-12-08 | Stop reason: HOSPADM

## 2021-12-04 RX ORDER — ROPINIROLE 1 MG/1
1 TABLET, FILM COATED ORAL 3 TIMES DAILY
Status: DISCONTINUED | OUTPATIENT
Start: 2021-12-04 | End: 2021-12-08 | Stop reason: HOSPADM

## 2021-12-04 RX ORDER — FERROUS SULFATE 325(65) MG
325 TABLET ORAL DAILY
Status: DISCONTINUED | OUTPATIENT
Start: 2021-12-04 | End: 2021-12-04 | Stop reason: HOSPADM

## 2021-12-04 RX ORDER — TRAZODONE HYDROCHLORIDE 50 MG/1
50 TABLET ORAL NIGHTLY
Status: DISCONTINUED | OUTPATIENT
Start: 2021-12-04 | End: 2021-12-08 | Stop reason: HOSPADM

## 2021-12-04 RX ORDER — POLYETHYLENE GLYCOL 3350 17 G/17G
17 POWDER, FOR SOLUTION ORAL DAILY
Status: DISCONTINUED | OUTPATIENT
Start: 2021-12-04 | End: 2021-12-04 | Stop reason: CLARIF

## 2021-12-04 RX ORDER — DEXTROSE MONOHYDRATE 25 G/50ML
12.5 INJECTION, SOLUTION INTRAVENOUS PRN
Status: DISCONTINUED | OUTPATIENT
Start: 2021-12-04 | End: 2021-12-04 | Stop reason: HOSPADM

## 2021-12-04 RX ORDER — FLUTICASONE PROPIONATE 50 MCG
1 SPRAY, SUSPENSION (ML) NASAL DAILY
Status: DISCONTINUED | OUTPATIENT
Start: 2021-12-04 | End: 2021-12-08 | Stop reason: HOSPADM

## 2021-12-04 RX ORDER — CETIRIZINE HYDROCHLORIDE 10 MG/1
10 TABLET ORAL NIGHTLY
Status: DISCONTINUED | OUTPATIENT
Start: 2021-12-04 | End: 2021-12-04 | Stop reason: HOSPADM

## 2021-12-04 RX ORDER — CETIRIZINE HYDROCHLORIDE 10 MG/1
10 TABLET ORAL DAILY PRN
Status: DISCONTINUED | OUTPATIENT
Start: 2021-12-04 | End: 2021-12-08 | Stop reason: HOSPADM

## 2021-12-04 RX ORDER — ACETAMINOPHEN 325 MG/1
650 TABLET ORAL EVERY 6 HOURS PRN
Status: DISCONTINUED | OUTPATIENT
Start: 2021-12-04 | End: 2021-12-04 | Stop reason: HOSPADM

## 2021-12-04 RX ORDER — CITALOPRAM 20 MG/1
20 TABLET ORAL
Status: DISCONTINUED | OUTPATIENT
Start: 2021-12-04 | End: 2021-12-04 | Stop reason: HOSPADM

## 2021-12-04 RX ORDER — FINASTERIDE 5 MG/1
5 TABLET, FILM COATED ORAL DAILY
Status: DISCONTINUED | OUTPATIENT
Start: 2021-12-04 | End: 2021-12-08 | Stop reason: HOSPADM

## 2021-12-04 RX ORDER — NICOTINE POLACRILEX 4 MG
15 LOZENGE BUCCAL PRN
Status: DISCONTINUED | OUTPATIENT
Start: 2021-12-04 | End: 2021-12-08 | Stop reason: HOSPADM

## 2021-12-04 RX ORDER — DEXTROSE MONOHYDRATE 25 G/50ML
12.5 INJECTION, SOLUTION INTRAVENOUS PRN
Status: DISCONTINUED | OUTPATIENT
Start: 2021-12-04 | End: 2021-12-08 | Stop reason: HOSPADM

## 2021-12-04 RX ORDER — ATORVASTATIN CALCIUM 20 MG/1
20 TABLET, FILM COATED ORAL NIGHTLY
Status: DISCONTINUED | OUTPATIENT
Start: 2021-12-04 | End: 2021-12-04 | Stop reason: HOSPADM

## 2021-12-04 RX ORDER — NICOTINE POLACRILEX 4 MG
15 LOZENGE BUCCAL PRN
Status: DISCONTINUED | OUTPATIENT
Start: 2021-12-04 | End: 2021-12-04 | Stop reason: HOSPADM

## 2021-12-04 RX ORDER — SODIUM CHLORIDE 0.9 % (FLUSH) 0.9 %
5-40 SYRINGE (ML) INJECTION EVERY 12 HOURS SCHEDULED
Status: DISCONTINUED | OUTPATIENT
Start: 2021-12-04 | End: 2021-12-04 | Stop reason: HOSPADM

## 2021-12-04 RX ORDER — SODIUM CHLORIDE 0.9 % (FLUSH) 0.9 %
5-40 SYRINGE (ML) INJECTION PRN
Status: DISCONTINUED | OUTPATIENT
Start: 2021-12-04 | End: 2021-12-04 | Stop reason: HOSPADM

## 2021-12-04 RX ORDER — CYCLOBENZAPRINE HCL 10 MG
10 TABLET ORAL 2 TIMES DAILY PRN
Status: DISCONTINUED | OUTPATIENT
Start: 2021-12-04 | End: 2021-12-08 | Stop reason: HOSPADM

## 2021-12-04 RX ORDER — DEXTROSE MONOHYDRATE 50 MG/ML
1000 INJECTION, SOLUTION INTRAVENOUS PRN
Status: DISCONTINUED | OUTPATIENT
Start: 2021-12-04 | End: 2021-12-04 | Stop reason: HOSPADM

## 2021-12-04 RX ORDER — POLYETHYLENE GLYCOL 3350 17 G/17G
17 POWDER, FOR SOLUTION ORAL DAILY
Status: DISCONTINUED | OUTPATIENT
Start: 2021-12-04 | End: 2021-12-08 | Stop reason: HOSPADM

## 2021-12-04 RX ORDER — TRAZODONE HYDROCHLORIDE 50 MG/1
50 TABLET ORAL NIGHTLY
Status: DISCONTINUED | OUTPATIENT
Start: 2021-12-04 | End: 2021-12-04 | Stop reason: HOSPADM

## 2021-12-04 RX ORDER — GLIPIZIDE 5 MG/1
10 TABLET ORAL
Status: DISCONTINUED | OUTPATIENT
Start: 2021-12-05 | End: 2021-12-08 | Stop reason: HOSPADM

## 2021-12-04 RX ORDER — POLYETHYLENE GLYCOL 3350 17 G/17G
17 POWDER, FOR SOLUTION ORAL DAILY PRN
Status: DISCONTINUED | OUTPATIENT
Start: 2021-12-04 | End: 2021-12-08 | Stop reason: HOSPADM

## 2021-12-04 RX ORDER — POLYETHYLENE GLYCOL 3350 17 G/17G
17 POWDER, FOR SOLUTION ORAL DAILY
Status: DISCONTINUED | OUTPATIENT
Start: 2021-12-04 | End: 2021-12-04 | Stop reason: HOSPADM

## 2021-12-04 RX ORDER — GEMFIBROZIL 600 MG/1
600 TABLET, FILM COATED ORAL
Status: DISCONTINUED | OUTPATIENT
Start: 2021-12-04 | End: 2021-12-04 | Stop reason: HOSPADM

## 2021-12-04 RX ORDER — SODIUM CHLORIDE 0.9 % (FLUSH) 0.9 %
5-40 SYRINGE (ML) INJECTION PRN
Status: DISCONTINUED | OUTPATIENT
Start: 2021-12-04 | End: 2021-12-08 | Stop reason: HOSPADM

## 2021-12-04 RX ORDER — ONDANSETRON 4 MG/1
4 TABLET, ORALLY DISINTEGRATING ORAL EVERY 8 HOURS PRN
Status: DISCONTINUED | OUTPATIENT
Start: 2021-12-04 | End: 2021-12-04 | Stop reason: HOSPADM

## 2021-12-04 RX ORDER — CITALOPRAM 20 MG/1
10 TABLET ORAL DAILY
Status: DISCONTINUED | OUTPATIENT
Start: 2021-12-04 | End: 2021-12-08 | Stop reason: HOSPADM

## 2021-12-04 RX ORDER — GEMFIBROZIL 600 MG/1
600 TABLET, FILM COATED ORAL
Status: DISCONTINUED | OUTPATIENT
Start: 2021-12-04 | End: 2021-12-08 | Stop reason: HOSPADM

## 2021-12-04 RX ORDER — FERROUS SULFATE 325(65) MG
325 TABLET ORAL DAILY
Status: DISCONTINUED | OUTPATIENT
Start: 2021-12-04 | End: 2021-12-08 | Stop reason: HOSPADM

## 2021-12-04 RX ORDER — LANOLIN ALCOHOL/MO/W.PET/CERES
3 CREAM (GRAM) TOPICAL NIGHTLY PRN
Status: DISCONTINUED | OUTPATIENT
Start: 2021-12-04 | End: 2021-12-04 | Stop reason: HOSPADM

## 2021-12-04 RX ORDER — TAMSULOSIN HYDROCHLORIDE 0.4 MG/1
0.4 CAPSULE ORAL NIGHTLY
Status: DISCONTINUED | OUTPATIENT
Start: 2021-12-04 | End: 2021-12-04 | Stop reason: HOSPADM

## 2021-12-04 RX ORDER — PANTOPRAZOLE SODIUM 40 MG/10ML
40 INJECTION, POWDER, LYOPHILIZED, FOR SOLUTION INTRAVENOUS DAILY
Status: DISCONTINUED | OUTPATIENT
Start: 2021-12-04 | End: 2021-12-04 | Stop reason: HOSPADM

## 2021-12-04 RX ORDER — SIMVASTATIN 20 MG
40 TABLET ORAL NIGHTLY
Status: DISCONTINUED | OUTPATIENT
Start: 2021-12-04 | End: 2021-12-08 | Stop reason: HOSPADM

## 2021-12-04 RX ORDER — SODIUM CHLORIDE 9 MG/ML
500 INJECTION, SOLUTION INTRAVENOUS PRN
Status: DISCONTINUED | OUTPATIENT
Start: 2021-12-04 | End: 2021-12-04 | Stop reason: HOSPADM

## 2021-12-04 RX ORDER — FINASTERIDE 5 MG/1
5 TABLET, FILM COATED ORAL DAILY
Status: DISCONTINUED | OUTPATIENT
Start: 2021-12-04 | End: 2021-12-04 | Stop reason: HOSPADM

## 2021-12-04 RX ORDER — ROPINIROLE 1 MG/1
1 TABLET, FILM COATED ORAL 3 TIMES DAILY
Status: DISCONTINUED | OUTPATIENT
Start: 2021-12-04 | End: 2021-12-04 | Stop reason: HOSPADM

## 2021-12-04 RX ORDER — ACETAMINOPHEN 650 MG/1
650 SUPPOSITORY RECTAL EVERY 6 HOURS PRN
Status: DISCONTINUED | OUTPATIENT
Start: 2021-12-04 | End: 2021-12-04 | Stop reason: HOSPADM

## 2021-12-04 RX ORDER — ONDANSETRON 4 MG/1
4 TABLET, ORALLY DISINTEGRATING ORAL EVERY 8 HOURS PRN
Status: DISCONTINUED | OUTPATIENT
Start: 2021-12-04 | End: 2021-12-08 | Stop reason: HOSPADM

## 2021-12-04 RX ORDER — SODIUM CHLORIDE 9 MG/ML
25 INJECTION, SOLUTION INTRAVENOUS PRN
Status: DISCONTINUED | OUTPATIENT
Start: 2021-12-04 | End: 2021-12-08 | Stop reason: HOSPADM

## 2021-12-04 RX ORDER — SODIUM CHLORIDE 0.9 % (FLUSH) 0.9 %
5-40 SYRINGE (ML) INJECTION EVERY 12 HOURS SCHEDULED
Status: DISCONTINUED | OUTPATIENT
Start: 2021-12-04 | End: 2021-12-08 | Stop reason: HOSPADM

## 2021-12-04 RX ORDER — ONDANSETRON 2 MG/ML
4 INJECTION INTRAMUSCULAR; INTRAVENOUS EVERY 6 HOURS PRN
Status: DISCONTINUED | OUTPATIENT
Start: 2021-12-04 | End: 2021-12-04 | Stop reason: HOSPADM

## 2021-12-04 RX ORDER — POLYETHYLENE GLYCOL 3350 17 G/17G
17 POWDER, FOR SOLUTION ORAL DAILY
Status: DISCONTINUED | OUTPATIENT
Start: 2021-12-04 | End: 2021-12-04 | Stop reason: SDUPTHER

## 2021-12-04 RX ORDER — LEVOTHYROXINE SODIUM 0.15 MG/1
150 TABLET ORAL DAILY
Status: DISCONTINUED | OUTPATIENT
Start: 2021-12-04 | End: 2021-12-04 | Stop reason: HOSPADM

## 2021-12-04 RX ORDER — ACETAMINOPHEN 325 MG/1
650 TABLET ORAL EVERY 6 HOURS PRN
Status: DISCONTINUED | OUTPATIENT
Start: 2021-12-04 | End: 2021-12-08 | Stop reason: HOSPADM

## 2021-12-04 RX ORDER — ONDANSETRON 2 MG/ML
4 INJECTION INTRAMUSCULAR; INTRAVENOUS EVERY 6 HOURS PRN
Status: DISCONTINUED | OUTPATIENT
Start: 2021-12-04 | End: 2021-12-08 | Stop reason: HOSPADM

## 2021-12-04 RX ORDER — ROPINIROLE 1 MG/1
1 TABLET, FILM COATED ORAL 3 TIMES DAILY
Status: DISCONTINUED | OUTPATIENT
Start: 2021-12-04 | End: 2021-12-04

## 2021-12-04 RX ORDER — TRAMADOL HYDROCHLORIDE 50 MG/1
50 TABLET ORAL EVERY 4 HOURS PRN
Status: DISCONTINUED | OUTPATIENT
Start: 2021-12-04 | End: 2021-12-08 | Stop reason: HOSPADM

## 2021-12-04 RX ADMIN — Medication 400 MG: at 09:53

## 2021-12-04 RX ADMIN — ROPINIROLE HYDROCHLORIDE 1 MG: 1 TABLET, FILM COATED ORAL at 01:54

## 2021-12-04 RX ADMIN — FINASTERIDE 5 MG: 5 TABLET, FILM COATED ORAL at 18:22

## 2021-12-04 RX ADMIN — TRAZODONE HYDROCHLORIDE 50 MG: 50 TABLET ORAL at 01:54

## 2021-12-04 RX ADMIN — LEVOTHYROXINE SODIUM 150 MCG: 0.15 TABLET ORAL at 09:53

## 2021-12-04 RX ADMIN — FINASTERIDE 5 MG: 5 TABLET, FILM COATED ORAL at 09:54

## 2021-12-04 RX ADMIN — CALCIUM 500 MG: 500 TABLET ORAL at 09:53

## 2021-12-04 RX ADMIN — ROPINIROLE HYDROCHLORIDE 1 MG: 1 TABLET, FILM COATED ORAL at 22:02

## 2021-12-04 RX ADMIN — FERROUS SULFATE TAB 325 MG (65 MG ELEMENTAL FE) 325 MG: 325 (65 FE) TAB at 18:22

## 2021-12-04 RX ADMIN — TRAMADOL HYDROCHLORIDE 50 MG: 50 TABLET, FILM COATED ORAL at 01:53

## 2021-12-04 RX ADMIN — CITALOPRAM HYDROBROMIDE 10 MG: 20 TABLET ORAL at 18:22

## 2021-12-04 RX ADMIN — INSULIN LISPRO 1 UNITS: 100 INJECTION, SOLUTION INTRAVENOUS; SUBCUTANEOUS at 11:30

## 2021-12-04 RX ADMIN — TAMSULOSIN HYDROCHLORIDE 0.4 MG: 0.4 CAPSULE ORAL at 22:03

## 2021-12-04 RX ADMIN — GEMFIBROZIL 600 MG: 600 TABLET, FILM COATED ORAL at 18:23

## 2021-12-04 RX ADMIN — Medication 6 ML: at 10:06

## 2021-12-04 RX ADMIN — INSULIN LISPRO 3 UNITS: 100 INJECTION, SOLUTION INTRAVENOUS; SUBCUTANEOUS at 18:23

## 2021-12-04 RX ADMIN — INSULIN LISPRO 2 UNITS: 100 INJECTION, SOLUTION INTRAVENOUS; SUBCUTANEOUS at 08:55

## 2021-12-04 RX ADMIN — POLYETHYLENE GLYCOL 3350 17 G: 17 POWDER, FOR SOLUTION ORAL at 10:44

## 2021-12-04 RX ADMIN — ROPINIROLE HYDROCHLORIDE 1 MG: 1 TABLET, FILM COATED ORAL at 18:22

## 2021-12-04 RX ADMIN — FERROUS SULFATE TAB 325 MG (65 MG ELEMENTAL FE) 325 MG: 325 (65 FE) TAB at 09:53

## 2021-12-04 RX ADMIN — GEMFIBROZIL 600 MG: 600 TABLET ORAL at 09:54

## 2021-12-04 RX ADMIN — CYCLOBENZAPRINE 10 MG: 10 TABLET, FILM COATED ORAL at 22:03

## 2021-12-04 RX ADMIN — ROPINIROLE HYDROCHLORIDE 1 MG: 1 TABLET, FILM COATED ORAL at 10:44

## 2021-12-04 RX ADMIN — PANTOPRAZOLE SODIUM 40 MG: 40 INJECTION, POWDER, FOR SOLUTION INTRAVENOUS at 10:05

## 2021-12-04 RX ADMIN — TRAZODONE HYDROCHLORIDE 50 MG: 50 TABLET ORAL at 22:02

## 2021-12-04 RX ADMIN — MELATONIN TAB 3 MG 3 MG: 3 TAB at 22:03

## 2021-12-04 ASSESSMENT — PAIN SCALES - GENERAL: PAINLEVEL_OUTOF10: 6

## 2021-12-04 NOTE — H&P
History and Physical      Name:  Christine Nicholas /Age/Sex: 1945  (68 y.o. male)   MRN & CSN:  [id number] & [id number] Admission Date/Time: 2021    Location:  ED14/ED-14 PCP: Luis Alberto Reeder MD         History of present illness     Chief Complaint:  Patient provided information. Was at PCP office when his Hgb was noted to be 5. Was sent to ER for  Blood transfusion and further evaluation of GI bleed. Christine Nicholas is a 68 y.o.  male his medical history includes BPH, diabetes mellitus, sleep apnea, thyroid cancer,and arthritis. who presents with Rectal Bleeding (was tranferred from [facility] ED, has been given 2 units of PRBC)    Patient states that he has been having rectal bleeding for the past 3 months. He sees Dr Altaf Man from GI. He states that he was diagnosed with radiation proctitis. states that he does have some dizziness when he stands up but that is been an ongoing issue. He is  Stable after receiving two units of blood. GI has been consulted for the GI bleed. Their recommendation is appreciated. Assessment and Plan:     GI bleeding   May be related to radiation proctitis   Blood loss anemia    stable following transfusion of two units of blood    Hgb improved from 5 to 8. H&H every 6Hrs, Hold all antiplatelet and anticoagulants    Transfuse when Hgb is less that 7    GI consult    # Diabetes Mellitus     POCT glucose, Hypoglycemic protocol     Home medication, Sliding scale insulin with meal coverage.     # BPH   On Finasteride    # Restless leg syndrome     Requip    # Hypothyroidism   Takes synthroid    # Hyperlipidemia    Simvastatin     # Sleep apnea    Uses CPAP at night       Medications:   Medications:    ferrous sulfate  325 mg Oral Daily    finasteride  5 mg Oral Daily    fluticasone  1 spray Each Nostril Daily    [START ON 2021] glipiZIDE  10 mg Oral QAM AC    levothyroxine  150 mcg Oral Daily    melatonin  3 mg Oral Daily    polyethylene glycol  17 g Oral Daily    simvastatin  40 mg Oral Nightly    tamsulosin  0.4 mg Oral Daily    traZODone  50 mg Oral Nightly    sodium chloride flush  5-40 mL IntraVENous 2 times per day    insulin lispro  0-6 Units SubCUTAneous TID WC    insulin lispro  0-3 Units SubCUTAneous Nightly      Infusions:    sodium chloride      dextrose       PRN Meds: cetirizine, 10 mg, Daily PRN  cyclobenzaprine, 10 mg, BID PRN  traMADol, 50 mg, Q4H PRN  sodium chloride flush, 5-40 mL, PRN  sodium chloride, 25 mL, PRN  acetaminophen, 650 mg, Q6H PRN   Or  acetaminophen, 650 mg, Q6H PRN  glucose, 15 g, PRN  dextrose, 12.5 g, PRN  glucagon (rDNA), 1 mg, PRN  dextrose, 100 mL/hr, PRN        Current Facility-Administered Medications:     cetirizine (ZYRTEC) tablet 10 mg, 10 mg, Oral, Daily PRN, Livan Akaeze, APRN - CNP    cyclobenzaprine (FLEXERIL) tablet 10 mg, 10 mg, Oral, BID PRN, Livan Akaeze, APRN - CNP    ferrous sulfate (IRON 325) tablet 325 mg, 325 mg, Oral, Daily, Livan Akaeze, APRN - CNP    finasteride (PROSCAR) tablet 5 mg, 5 mg, Oral, Daily, Livan Akaeze, APRN - CNP    fluticasone (FLONASE) 50 MCG/ACT nasal spray 1 spray, 1 spray, Each Nostril, Daily, Livan Akaeze, APRN - CNP    [START ON 12/5/2021] glipiZIDE (GLUCOTROL) tablet 10 mg, 10 mg, Oral, QAM AC, Livan Akaeze, APRN - CNP    levothyroxine (SYNTHROID) tablet 150 mcg, 150 mcg, Oral, Daily, Livan Akaeze, APRN - CNP    melatonin tablet 3 mg, 3 mg, Oral, Daily, Livan Akaeze, APRN - CNP    polyethylene glycol (GLYCOLAX) powder 17 g, 17 g, Oral, Daily, Livan Akaeze, APRN - CNP    simvastatin (ZOCOR) tablet 40 mg, 40 mg, Oral, Nightly, Livan Akaeze, APRN - CNP    tamsulosin (FLOMAX) capsule 0.4 mg, 0.4 mg, Oral, Daily, Livan Ramosze, APRN - CNP    traMADol (ULTRAM) tablet 50 mg, 50 mg, Oral, Q4H PRN, Livan Guaman, APRN - CNP    traZODone (DESYREL) tablet 50 mg, 50 mg, Oral, Nightly, Livan Ramosze, APRN - CNP    sodium chloride flush 0.9 % injection 5-40 mL, 5-40 mL, IntraVENous, 2 times per day, Livan Akaeze, APRN - CNP    sodium chloride flush 0.9 % injection 5-40 mL, 5-40 mL, IntraVENous, PRN, Livan Akaeze, APRN - CNP    0.9 % sodium chloride infusion, 25 mL, IntraVENous, PRN, Livan Akaeze, APRN - CNP    acetaminophen (TYLENOL) tablet 650 mg, 650 mg, Oral, Q6H PRN **OR** acetaminophen (TYLENOL) suppository 650 mg, 650 mg, Rectal, Q6H PRN, Livan Akaeze, APRN - CNP    glucose (GLUTOSE) 40 % oral gel 15 g, 15 g, Oral, PRN, Livan Akaeze, APRN - CNP    dextrose 50 % IV solution, 12.5 g, IntraVENous, PRN, Livan Akaeze, APRN - CNP    glucagon (rDNA) injection 1 mg, 1 mg, IntraMUSCular, PRN, Livan Akaeze, APRN - CNP    dextrose 5 % solution, 100 mL/hr, IntraVENous, PRN, Livan Akaeze, APRN - CNP    insulin lispro (HUMALOG) injection vial 0-6 Units, 0-6 Units, SubCUTAneous, TID WC, Livan Akaeze, APRN - CNP    insulin lispro (HUMALOG) injection vial 0-3 Units, 0-3 Units, SubCUTAneous, Nightly, Livan Akaeze, APRN - CNP    Current Outpatient Medications:     leuprolide (LUPRON) 45 MG injection, Inject into the muscle, Disp: , Rfl:     rOPINIRole (REQUIP) 1 MG tablet, Take 1 mg by mouth 3 times daily, Disp: , Rfl:     finasteride (PROSCAR) 5 MG tablet, Take 5 mg by mouth daily, Disp: , Rfl:     polyethylene glycol (GLYCOLAX) 17 GM/SCOOP powder, Take 17 g by mouth daily, Disp: , Rfl:     ferrous sulfate (IRON 325) 325 (65 Fe) MG tablet, Take 325 mg by mouth daily, Disp: , Rfl:     tamsulosin (FLOMAX) 0.4 MG capsule, Take 0.4 mg by mouth daily, Disp: , Rfl:     cetirizine (ZYRTEC) 10 MG tablet, Take by mouth, Disp: , Rfl:     citalopram (CELEXA) 20 MG tablet, TAKE 1 TABLET BY MOUTH  DAILY WITH DINNER, Disp: , Rfl:     levothyroxine (SYNTHROID) 150 MCG tablet, 1 tab a day for 6 days a week and 1.5 tab on Sundays. , Disp: , Rfl:     gemfibrozil (LOPID) 600 MG tablet, Take 600 mg by mouth 2 times daily, Disp: , Rfl:     metFORMIN (GLUCOPHAGE) 500 MG tablet, TAKE 1 TABLET BY MOUTH  TWICE DAILY, Disp: , Rfl:     metFORMIN (GLUCOPHAGE) 500 MG tablet, TAKE 1 TABLET BY MOUTH IN THE MORNING AND 1 WITH EVENING MEAL, Disp: , Rfl:     traMADol (ULTRAM) 50 MG tablet, Take 50 mg by mouth every 4 hours as needed. , Disp: , Rfl:     cyclobenzaprine (FLEXERIL) 10 MG tablet, TAKE 1 TABLET BY MOUTH AT BEDTIME AS NEEDED, Disp: , Rfl:     cetirizine (ZYRTEC) 10 MG tablet, Take by mouth, Disp: , Rfl:     traZODone (DESYREL) 50 MG tablet, Take 50 mg by mouth nightly, Disp: , Rfl:     Magnesium Oxide 400 MG CAPS, Take by mouth, Disp: , Rfl:     pseudoephedrine (SUDAFED) 30 MG tablet, Take by mouth PRN, Disp: , Rfl:     Multiple Vitamins-Minerals (FITNESS TABS FOR MEN AM/PM PO), Take by mouth, Disp: , Rfl:     Omega-3 Fatty Acids (OMEGA-3 FISH OIL) 1000 MG CAPS, Take by mouth, Disp: , Rfl:     fluticasone (FLONASE) 50 MCG/ACT nasal spray, 1 spray by Each Nare route daily, Disp: , Rfl:     diphenhydrAMINE (BENADRYL) 25 MG tablet, Take 25 mg by mouth every 6 hours as needed for Itching, Disp: , Rfl:     levothyroxine (SYNTHROID) 150 MCG tablet, Take 150 mcg by mouth daily, Disp: , Rfl:     calcium 600 MG TABS tablet, Take 500 mg by mouth 3 times daily 1800 total, Disp: , Rfl:     melatonin 3 MG TABS tablet, Take 3 mg by mouth daily, Disp: , Rfl:     glipiZIDE (GLUCOTROL) 10 MG tablet, Take 10 mg by mouth every morning (before breakfast) , Disp: , Rfl:     gemfibrozil (LOPID) 600 MG tablet, Take 600 mg by mouth 2 times daily (before meals). , Disp: , Rfl:     simvastatin (ZOCOR) 40 MG tablet, Take 40 mg by mouth nightly., Disp: , Rfl:      Review of Systems     GENERAL:  Denies fever, chills, night sweats, or changes in weight. EYES:  Denies recent visual changes. ENT:  Denies ear pain, hearing loss or tinnitus  RESP:  Denies any cough, dyspnea, or wheezing.   CV:  Denies any chest pain with exertion or at rest, palpitations,   GI: Endorsed  Nausea, denies  abdominal pain, heartburn, changes in bowel habit,  Endorsed  rectal bleeding  MUSCULOSKELETAL:  Denies any joint swelling, joint pain, or loss of range of motion.   NEURO:  Denies any headaches, tremors,memory loss, confusion, weakness,   ENDO:  Denies any heat or cold intolerance,     Objective:     No intake or output data in the 24 hours ending 12/04/21 1554     Vitals:   Vitals:    12/04/21 1413   BP: (!) 155/80   Pulse: 74   Resp: 16   Temp: 98.1 °F (36.7 °C)   SpO2: 98%       Recent Results (from the past 24 hour(s))   CBC Auto Differential    Collection Time: 12/03/21  6:20 PM   Result Value Ref Range    WBC 5.9 4.0 - 10.5 K/CU MM    RBC 2.81 (L) 4.6 - 6.2 M/CU MM    Hemoglobin 5.4 (LL) 13.5 - 18.0 GM/DL    Hematocrit 19.5 (L) 42 - 52 %    MCV 69.4 (L) 78 - 100 FL    MCH 19.2 (L) 27 - 31 PG    MCHC 27.7 (L) 32.0 - 36.0 %    RDW 18.3 (H) 11.7 - 14.9 %    Platelets 482 913 - 570 K/CU MM    MPV 9.3 7.5 - 11.1 FL    Differential Type AUTOMATED DIFFERENTIAL     Segs Relative 78.6 (H) 36 - 66 %    Lymphocytes % 11.6 (L) 24 - 44 %    Monocytes % 7.2 (H) 0 - 4 %    Eosinophils % 1.9 0 - 3 %    Basophils % 0.2 0 - 1 %    Segs Absolute 4.7 K/CU MM    Lymphocytes Absolute 0.7 K/CU MM    Monocytes Absolute 0.4 K/CU MM    Eosinophils Absolute 0.1 K/CU MM    Basophils Absolute 0.0 K/CU MM    Immature Neutrophil % 0.5 (H) 0 - 0.43 %    Total Immature Neutrophil 0.03 K/CU MM   Comprehensive Metabolic Panel w/ Reflex to MG    Collection Time: 12/03/21  6:20 PM   Result Value Ref Range    Sodium 131 (L) 135 - 145 MMOL/L    Potassium 4.5 3.5 - 5.1 MMOL/L    Chloride 94 (L) 99 - 110 mMol/L    CO2 26 21 - 32 MMOL/L    BUN 13 6 - 23 MG/DL    CREATININE 0.8 (L) 0.9 - 1.3 MG/DL    Glucose 280 (H) 70 - 99 MG/DL    Calcium 7.9 (L) 8.3 - 10.6 MG/DL    Albumin 3.9 3.4 - 5.0 GM/DL    Total Protein 6.5 6.4 - 8.2 GM/DL    Total Bilirubin 0.2 0.0 - 1.0 MG/DL    ALT 36 10 - 40 U/L    AST 47 (H) 15 - 37 IU/L    Alkaline Phosphatase 65 40 - 129 IU/L    GFR Non-African American >60 >60 mL/min/1.73m2    GFR African American >60 >60 mL/min/1.73m2    Anion Gap 11 4 - 16   TYPE AND SCREEN    Collection Time: 12/03/21  6:20 PM   Result Value Ref Range    ABO/Rh A POSITIVE     Antibody Screen NEGATIVE     Unit Number [id number]     Component LEUKO-POOR RED CELLS     Unit Divison 00     Status ISSUED     Transfusion Status OK TO TRANSFUSE     Crossmatch Result COMPATIBLE     Unit Number [id number]     Component LEUKO-POOR RED CELLS     Unit Divison 00     Status ISSUED     Transfusion Status OK TO TRANSFUSE     Crossmatch Result COMPATIBLE    APTT    Collection Time: 12/03/21  6:20 PM   Result Value Ref Range    aPTT 26.9 25.1 - 37.1 SECONDS   Protime-INR    Collection Time: 12/03/21  6:20 PM   Result Value Ref Range    Protime 12.7 11.7 - 14.5 SECONDS    INR 1.02 INDEX   Iron and TIBC    Collection Time: 12/03/21  7:20 PM   Result Value Ref Range    Iron 206 (H) 59 - 158 ug/dL    UIBC 222 110 - 370 ug/dL    TIBC 428 250 - 450 ug/dL    Transferrin % 48 (H) 10 - 44 %   Ferritin    Collection Time: 12/03/21  7:20 PM   Result Value Ref Range    Ferritin 7 (L) 30 - 400 NG/ML   Basic Metabolic Panel w/ Reflex to MG    Collection Time: 12/04/21  6:00 AM   Result Value Ref Range    Sodium 135 135 - 145 MMOL/L    Potassium 4.3 3.5 - 5.1 MMOL/L    Chloride 99 99 - 110 mMol/L    CO2 25 21 - 32 MMOL/L    Anion Gap 11 4 - 16    BUN 8 6 - 23 MG/DL    CREATININE 0.7 (L) 0.9 - 1.3 MG/DL    Glucose 205 (H) 70 - 99 MG/DL    Calcium 7.8 (L) 8.3 - 10.6 MG/DL    GFR Non-African American >60 >60 mL/min/1.73m2    GFR African American >60 >60 mL/min/1.73m2   CBC auto differential    Collection Time: 12/04/21  6:00 AM   Result Value Ref Range    WBC 6.5 4.0 - 10.5 K/CU MM    RBC 3.76 (L) 4.6 - 6.2 M/CU MM    Hemoglobin 8.3 (L) 13.5 - 18.0 GM/DL    Hematocrit 28.6 (L) 42 - 52 %    MCV 76.1 (L) 78 - 100 FL    MCH 22.1 (L) 27 - 31 PG    MCHC 29.0 (L) 32.0 - 36.0 %    RDW 23.3 (H) 11.7 - 14.9 %    Platelets 542 017 - 115 K/CU MM    MPV 10.1 7.5 - 11.1 FL    Differential Type AUTOMATED DIFFERENTIAL     Segs Relative 69.8 (H) 36 - 66 %    Lymphocytes % 17.1 (L) 24 - 44 %    Monocytes % 8.8 (H) 0 - 4 %    Eosinophils % 3.5 (H) 0 - 3 %    Basophils % 0.3 0 - 1 %    Segs Absolute 4.5 K/CU MM    Lymphocytes Absolute 1.1 K/CU MM    Monocytes Absolute 0.6 K/CU MM    Eosinophils Absolute 0.2 K/CU MM    Basophils Absolute 0.0 K/CU MM    Immature Neutrophil % 0.5 (H) 0 - 0.43 %    Total Immature Neutrophil 0.03 K/CU MM   POCT Glucose    Collection Time: 12/04/21  7:48 AM   Result Value Ref Range    POC Glucose 217 (H) 70 - 99 MG/DL   POCT Glucose    Collection Time: 12/04/21 11:25 AM   Result Value Ref Range    POC Glucose 175 (H) 70 - 99 MG/DL       Physical Exam:     Gen:Awake male, sitting upright in Bed or Chair  in no  Acute  distress. Appears given age. Eyes: PERRL. No sclera icterus. No conjunctival injection. + pale palpebral conjunctiva   ENT: No discharge. Pharynx clear. Neck: Trachea midline. Resp: No accessory muscle use. No crackles. No wheezes. No rhonchi. CV: Regular rate. Regular rhythm. + murmur. No rub. + pitting edema to BLE   Capillary Refill: Brisk,< 3 seconds   Peripheral Pulses: +2 palpable, equal bilaterally   GI: Non-tender. Non-distended. Normal bowel sounds. Skin: Warm and dry. Pale  M/S: No cyanosis. No joint deformity. No clubbing. Neuro:  cranial nerves appear grossly intact Awake. Grossly nonfocal    Psych: Oriented x 3. No anxiety or agitation.        Past Medical History:      Past Medical History:   Diagnosis Date    Arthritis     BPH (benign prostatic hyperplasia)     CPAP (continuous positive airway pressure) dependence     Diabetes mellitus ([address]     dx 2012    Erectile dysfunction     Hyperlipidemia     Sleep apnea     had sleep study done 2015=- uses cpap nightly    Thyroid cancer ([address] 06/2018    Total thyroidectomy     Wears dentures upper    Wears glasses     \"just to read\"     PSHX:  has a past surgical history that includes Rotator cuff repair (Left, ); Colonoscopy; Colonoscopy (2013); Cataract removal (Bilateral, 2015); Appendectomy (); Carpal tunnel release (Right, ); hernia repair (Right, ); eye surgery (Right); Cholecystectomy (10/11/2018); pr lap,cholecystectomy (N/A, 10/11/2018); Quadraceps tendon repair (Left, 2020); and Leg Muscle Surgery (Left, 2020). Allergies: Allergies   Allergen Reactions    Seasonal      Dust, dander,pollen    Ciprofloxacin Hives    Pcn [Penicillins] Hives       FAM HX: family history includes Heart Disease in his mother; High Blood Pressure in his father; Stroke in his father and mother. Soc HX:   Social History     Socioeconomic History    Marital status:      Spouse name: None    Number of children: None    Years of education: None    Highest education level: None   Occupational History    None   Tobacco Use    Smoking status: Former Smoker     Types: Cigarettes     Quit date: 1996     Years since quittin.2    Smokeless tobacco: Never Used    Tobacco comment: \"use the vapor cigarette occ not on regular basis\"   Vaping Use    Vaping Use: Every day    Substances: Always   Substance and Sexual Activity    Alcohol use: No     Comment: \"use to drink in regular basis- quit age 42's   [facility] Drug use: No    Sexual activity: Yes     Partners: Female   Other Topics Concern    None   Social History Narrative    None     Social Determinants of Health     Financial Resource Strain:     Difficulty of Paying Living Expenses: Not on file   Food Insecurity:     Worried About Running Out of Food in the Last Year: Not on file    Gisselle of Food in the Last Year: Not on file   Transportation Needs:     Lack of Transportation (Medical): Not on file    Lack of Transportation (Non-Medical):  Not on file   Physical Activity:     Days of Exercise per Week: Not on file    Minutes of Exercise per Session: Not on file   Stress:     Feeling of Stress : Not on file   Social Connections:     Frequency of Communication with Friends and Family: Not on file    Frequency of Social Gatherings with Friends and Family: Not on file    Attends Confucianist Services: Not on file    Active Member of [address] [place] [address] or Organizations: Not on file    Attends Club or Organization Meetings: Not on file    Marital Status: Not on file   Intimate Partner Violence:     Fear of Current or Ex-Partner: Not on file    Emotionally Abused: Not on file    Physically Abused: Not on file    Sexually Abused: Not on file   Housing Stability:     Unable to Pay for Housing in the Last Year: Not on file    Number of [place] in the Last Year: Not on file    Unstable Housing in the Last Year: Not on file       Electronically signed by NILSON Morris CNP on 12/4/2021 at 3:54 PM

## 2021-12-04 NOTE — ED PROVIDER NOTES
ADDENDUM:    Care of the patient was assumed  from Dr. Pantera Jha. I have reviewed the notes, assessments, and/or procedures performed, I concur with her/his documentation on Shanique Liat. I reviewed the medical record and evaluated the patient. ED COURSE/MDM:  Laboratory and imaging data were reviewed and care plan was arranged with the patient(see separate lab/imaging reports). RADIOLOGY:  Already resulted studies have been reviewed. No orders to display       Labs Reviewed   CBC WITH AUTO DIFFERENTIAL - Abnormal; Notable for the following components:       Result Value    RBC 2.81 (*)     Hemoglobin 5.4 (*)     Hematocrit 19.5 (*)     MCV 69.4 (*)     MCH 19.2 (*)     MCHC 27.7 (*)     RDW 18.3 (*)     Segs Relative 78.6 (*)     Lymphocytes % 11.6 (*)     Monocytes % 7.2 (*)     Immature Neutrophil % 0.5 (*)     All other components within normal limits   COMPREHENSIVE METABOLIC PANEL W/ REFLEX TO MG FOR LOW K - Abnormal; Notable for the following components:    Sodium 131 (*)     Chloride 94 (*)     CREATININE 0.8 (*)     Glucose 280 (*)     Calcium 7.9 (*)     AST 47 (*)     All other components within normal limits   APTT   PROTIME-INR   IRON AND TIBC   FERRITIN   TYPE AND SCREEN   TYPE AND SCREEN   PREPARE RBC (CROSSMATCH)       Medications   0.9 % sodium chloride infusion (has no administration in time range)       Vitals:    12/03/21 1728   BP: (!) 160/71   Pulse: 101   Resp: 20   Temp: 97.9 °F (36.6 °C)   TempSrc: Oral   SpO2: 97%       Patient to be admitted/. Transfusion orders placed by previous doctor. [facility] was to be [facility] to [facility] and transfused however the admitting team has now refused the previous accepted admission because there is no GI. I have assumed care and I have called Dr. Carina Tapia at [facility] who has accepted this patient in transfer and because he has Hb 5.1 I am sending the patient to ER at [facility] and I will confirm with supervising ER doctor at [facility].   The blood will not be available and was scheduled for [facility] inpatient. He will get blood at [facility], I will do emergency release if needed  My typical dicussion, presentation, and considerations for this patients' chief complaint, diagnosis, differential diagnosis, medications, medication use,  medication safety and medication interactions have been explained and outlined to this patient for this patient encounter. I have stressed need for follow up and reexamination for this encounter and I have contacted hospitalist for admission  FINAL IMPRESSION:  1.  Rectal bleeding        New Prescriptions    No medications on file                 Kiara Coleman,   12/03/21 2036       Kiara Coleman, DO  12/03/21 2154

## 2021-12-04 NOTE — ED NOTES
This nurse called and ordered patient dinner tray per request.      Franklin Galvan RN  12/04/21 7064

## 2021-12-04 NOTE — ED NOTES
Report to [facility] ED charge ([id number]). [facility] at bedside. Pt ED- ED transfer until bed ready per house supervisor.       Debbie Brantley RN  12/04/21 4739

## 2021-12-04 NOTE — ED NOTES
[facility] responded and will put in admitting orders for patient     Kiran Nichols.  Venancio  12/04/21 1513

## 2021-12-04 NOTE — CONSULTS
Hospital Medicine History & Physical      PCP: Laure Mckeon MD    Date of Admission: 12/3/2021    Date of Service: Pt seen/examined on 12/3/2021    Chief Complaint:    Chief Complaint   Patient presents with    Rectal Bleeding     Pt sent from doctor office for hemoglobin of 5- reports rectal bleeding for one month, w/ increase in last 24 hours        History Of Present Illness: The patient is a 68 y.o. male with diabetes, hyperlipidemia, sleep apnea, history of thyroidectomy secondary to thyroid cancer, prostate cancer social start who presented to [place] ED with complaint of rectal bleeding and anemia. Patient reports that this started about 4 months ago. He had intermittent bleeding with his stools only. He states that he was seen in the ER and September and had a hemoglobin drop from 14 down to 10 at that time. He reports that he followed up with a GI doctor Dr. Chaim Castro and had a colonoscopy which they could not complete secondary to incomplete prep. It was noted that the patient appeared to have radiation proctitis and felt like the bleeding could have been related to this from the irritation and was started on steroid suppositories. He states since starting the suppositories the bleeding had improved and they discussed it with the GI doctor that they would reassess again in the spring. However, the patient states that over the last few days the symptoms have gotten worse. Instead of intermittently having bright red streaks in his stool, he states that he has episodes of significant bright red blood that fills the toilet bowl. He states that the suppositories have not been helping this lately. He followed up with his primary care doctor and was found to have a hemoglobin drop down to the fives and recommended to come to the emergency department.     There was discussion of admission to [facility] however there is no GI consult or abilities for procedures at the hospital.  The concern would be that the patient will get blood transfused but if the rectal bleeding did not improve, he would still need to have intervention/evaluation. 2 units of packed red blood cells have been ordered and will be delivered from [facility]. Plan is to transfer the patient to [facility] ED to ED when a bed is available. Past Medical History:        Diagnosis Date    Arthritis     BPH (benign prostatic hyperplasia)     CPAP (continuous positive airway pressure) dependence     Diabetes mellitus ([facility]     dx 2012    Erectile dysfunction     Hyperlipidemia     Sleep apnea     had sleep study done 2015=- uses cpap nightly    Thyroid cancer ([facility] 06/2018    Total thyroidectomy     Wears dentures     upper    Wears glasses     \"just to read\"       Past Surgical History:        Procedure Laterality Date    APPENDECTOMY  2006    CARPAL TUNNEL RELEASE Right 2014    CATARACT REMOVAL Bilateral 12/01/2015    CHOLECYSTECTOMY  10/11/2018    COLONOSCOPY      COLONOSCOPY  04/26/2013    EYE SURGERY Right     cataract extraction with implant/ fritz \"eyelid lift- 2015    HERNIA REPAIR Right 1990's    inguinal    LEG MUSCLE SURGERY Left 2/27/2020    LEFT QUADRICEPS REPAIR performed by Hailey Elkins DO at [address] [facility] N/A 10/11/2018    CHOLECYSTECTOMY LAPAROSCOPIC performed by Chris Blevins MD at [facility] Left 02/27/2020     Repair of left quadriceps tendon using [employer]    ROTATOR CUFF REPAIR Left 2005       Medications Prior to Admission:    Prior to Admission medications    Medication Sig Start Date End Date Taking?  Authorizing Provider   rOPINIRole (REQUIP) 1 MG tablet Take 1 mg by mouth 3 times daily 12/3/21 1/2/22 Yes Historical Provider, MD   finasteride (PROSCAR) 5 MG tablet Take 5 mg by mouth daily 4/21/21  Yes Historical Provider, MD   ferrous sulfate (IRON 325) 325 (65 Fe) MG tablet Take 325 mg by mouth daily 12/3/21 3/3/22 Yes Historical Provider, MD tamsulosin (FLOMAX) 0.4 MG capsule Take 0.4 mg by mouth daily 4/21/21  Yes Historical Provider, MD   citalopram (CELEXA) 20 MG tablet TAKE 1 TABLET BY MOUTH  DAILY WITH DINNER 1/30/20  Yes Historical Provider, MD   levothyroxine (SYNTHROID) 150 MCG tablet 1 tab a day for 6 days a week and 1.5 tab on Sundays. 11/14/18  Yes Historical Provider, MD   gemfibrozil (LOPID) 600 MG tablet Take 600 mg by mouth 2 times daily 1/5/21  Yes Historical Provider, MD   metFORMIN (GLUCOPHAGE) 500 MG tablet TAKE 1 TABLET BY MOUTH  TWICE DAILY 3/14/20  Yes Historical Provider, MD   traMADol (ULTRAM) 50 MG tablet Take by mouth. 6/25/16  Yes Historical Provider, MD   leuprolide (LUPRON) 45 MG injection Inject into the muscle    Historical Provider, MD   polyethylene glycol (GLYCOLAX) 17 GM/SCOOP powder Take 17 g by mouth daily    Historical Provider, MD   cetirizine (ZYRTEC) 10 MG tablet Take by mouth    Historical Provider, MD   traZODone (DESYREL) 100 MG tablet  9/7/21   Historical Provider, MD   metFORMIN (GLUCOPHAGE) 500 MG tablet TAKE 1 TABLET BY MOUTH IN THE MORNING AND 1 WITH EVENING MEAL 3/14/20   Historical Provider, MD   citalopram (CELEXA) 20 MG tablet  1/30/20   Historical Provider, MD   traMADol (ULTRAM) 50 MG tablet Take 50 mg by mouth every 4 hours as needed.     Historical Provider, MD   CALCIUM PO Take 500 mg by mouth    Historical Provider, MD   cyclobenzaprine (FLEXERIL) 10 MG tablet TAKE 1 TABLET BY MOUTH AT BEDTIME AS NEEDED 3/9/20   Historical Provider, MD   cetirizine (ZYRTEC) 10 MG tablet Take by mouth    Historical Provider, MD   traZODone (DESYREL) 50 MG tablet Take 50 mg by mouth nightly    Historical Provider, MD   Magnesium Oxide 400 MG CAPS Take by mouth 10/12/16   Historical Provider, MD   pseudoephedrine (SUDAFED) 30 MG tablet Take by mouth PRN    Historical Provider, MD   Multiple Vitamins-Minerals (FITNESS TABS FOR MEN AM/PM PO) Take by mouth    Historical Provider, MD   Omega-3 Fatty Acids (OMEGA-3 anxiety    PHYSICAL EXAM:    BP (!) 187/81   Pulse 96   Temp 97.9 °F (36.6 °C) (Oral)   Resp 20   SpO2 99%   Gen: Elderly male, No distress. Alert. Eyes: PERRL. No sclera icterus. No conjunctival injection. + pale palpebral conjunctiva   ENT: No discharge. Pharynx clear. Neck: Trachea midline. Resp: No accessory muscle use. No crackles. No wheezes. No rhonchi. CV: Regular rate. Regular rhythm. + murmur. No rub. + pitting edema to BLE   Capillary Refill: Brisk,< 3 seconds   Peripheral Pulses: +2 palpable, equal bilaterally   GI: Non-tender. Non-distended. Normal bowel sounds. Skin: Warm and dry. Pale  M/S: No cyanosis. No joint deformity. No clubbing. Neuro: Awake. Grossly nonfocal    Psych: Oriented x 3. No anxiety or agitation.      CBC:   Recent Labs     12/03/21  1820   WBC 5.9   HGB 5.4*   HCT 19.5*   MCV 69.4*        BMP:   Recent Labs     12/03/21  1820   *   K 4.5   CL 94*   CO2 26   BUN 13   CREATININE 0.8*     LIVER PROFILE:   Recent Labs     12/03/21  1820   AST 47*   ALT 36   BILITOT 0.2   ALKPHOS 65     CULTURES  None     EKG:  I have reviewed the EKG with the following interpretation:   None     RADIOLOGY  No orders to display     Pertinent previous results reviewed   None     ASSESSMENT/PLAN:  Acute on chronic microcytic anemia  Blood loss anemia  -Baseline Hgb ~14, was 19 in September   -Hgb on admission 5.4  -Patient has rectal bleeding from known radiation proctitis and has been ongoing for months however they have been unable to stop it from procedural standpoint secondary to inflammation and changes from radiation  -Transfuse 2 units P RBCs  -Trend H/H  -Follows with Dr. Carlito Carrera   - Started on iron supplementation by PCP as well, will check iron levels     Radiation proctitis  - hx of prostate cancer s/p radiation ~ 4 years ago   - Follows with Dr. Dejesus in [place]   - has been Rx lupron     Rectal Bleeding   - has had issues in the past   - reports following with GI  [facility], had colonoscopy but couldn't be completed 2/2 poor prep?   - Now with worsening bleeding, reports filling the toilet bowel with BRB over the last few days, but does still have some BM without significant bleeding   - Hgb dropped from 10->5.4 since September, but states that the bleeding was under control until recently   - Uses a steroid suppository BID     DM2  - BG elevated on admission   - hold home oral Rx   - Continue SSI     Hyponatremia   - Mild, Na: 131   - Mild hydration with blood transfusions   - Trend     RLS  - Continue ropinirole     Prior Stroke  - MRI Brain from June 2021 with lacunar infarct within the left cerebellum   - No longer on ASA with active rectal bleeding   - Continue statin     Hx of Thyroid Cancer s/p thyroidectomy   - continue home synthroid   - [facility]ocr[facility]ology at [facility]     DVT Prophylaxis: SCD  Diet: No diet orders on file   Code Status: Prior      Gail Pedroza PA-C  12/3/2021 11:05 PM

## 2021-12-04 NOTE — ED NOTES
Bed: ED-14  Expected date: 12/4/21  Expected time:   Means of arrival:   Comments:  [place]  [place]  12/04/21 [id number]

## 2021-12-05 PROBLEM — E03.9 ACQUIRED HYPOTHYROIDISM: Status: ACTIVE | Noted: 2021-12-05

## 2021-12-05 PROBLEM — G47.33 OSA ON CPAP: Status: ACTIVE | Noted: 2021-12-05

## 2021-12-05 PROBLEM — Z99.89 OSA ON CPAP: Status: ACTIVE | Noted: 2021-12-05

## 2021-12-05 LAB
ABO/RH: NORMAL
ANION GAP SERPL CALCULATED.3IONS-SCNC: 9 MMOL/L (ref 4–16)
ANTIBODY SCREEN: NEGATIVE
BASOPHILS ABSOLUTE: 0 K/CU MM
BASOPHILS RELATIVE PERCENT: 0.2 % (ref 0–1)
BUN BLDV-MCNC: 9 MG/DL (ref 6–23)
CALCIUM SERPL-MCNC: 8.1 MG/DL (ref 8.3–10.6)
CHLORIDE BLD-SCNC: 103 MMOL/L (ref 99–110)
CO2: 27 MMOL/L (ref 21–32)
COMPONENT: NORMAL
COMPONENT: NORMAL
CREAT SERPL-MCNC: 0.8 MG/DL (ref 0.9–1.3)
CROSSMATCH RESULT: NORMAL
CROSSMATCH RESULT: NORMAL
DIFFERENTIAL TYPE: ABNORMAL
EOSINOPHILS ABSOLUTE: 0.3 K/CU MM
EOSINOPHILS RELATIVE PERCENT: 4.6 % (ref 0–3)
GFR AFRICAN AMERICAN: >60 ML/MIN/1.73M2
GFR NON-AFRICAN AMERICAN: >60 ML/MIN/1.73M2
GLUCOSE BLD-MCNC: 196 MG/DL (ref 70–99)
GLUCOSE BLD-MCNC: 216 MG/DL (ref 70–99)
GLUCOSE BLD-MCNC: 220 MG/DL (ref 70–99)
GLUCOSE BLD-MCNC: 276 MG/DL (ref 70–99)
GLUCOSE BLD-MCNC: 343 MG/DL (ref 70–99)
HCT VFR BLD CALC: 27.4 % (ref 42–52)
HCT VFR BLD CALC: 27.6 % (ref 42–52)
HCT VFR BLD CALC: 29.3 % (ref 42–52)
HEMOGLOBIN: 7.9 GM/DL (ref 13.5–18)
HEMOGLOBIN: 8 GM/DL (ref 13.5–18)
HEMOGLOBIN: 8.6 GM/DL (ref 13.5–18)
IMMATURE NEUTROPHIL %: 0.7 % (ref 0–0.43)
LYMPHOCYTES ABSOLUTE: 0.7 K/CU MM
LYMPHOCYTES RELATIVE PERCENT: 12 % (ref 24–44)
MCH RBC QN AUTO: 22 PG (ref 27–31)
MCHC RBC AUTO-ENTMCNC: 29 % (ref 32–36)
MCV RBC AUTO: 75.8 FL (ref 78–100)
MONOCYTES ABSOLUTE: 0.5 K/CU MM
MONOCYTES RELATIVE PERCENT: 7.6 % (ref 0–4)
NUCLEATED RBC %: 0.3 %
PDW BLD-RTO: 22.4 % (ref 11.7–14.9)
PLATELET # BLD: 441 K/CU MM (ref 140–440)
PMV BLD AUTO: 9.3 FL (ref 7.5–11.1)
POTASSIUM SERPL-SCNC: 4.5 MMOL/L (ref 3.5–5.1)
RBC # BLD: 3.64 M/CU MM (ref 4.6–6.2)
SEGMENTED NEUTROPHILS ABSOLUTE COUNT: 4.4 K/CU MM
SEGMENTED NEUTROPHILS RELATIVE PERCENT: 74.9 % (ref 36–66)
SODIUM BLD-SCNC: 139 MMOL/L (ref 135–145)
STATUS: NORMAL
STATUS: NORMAL
TOTAL IMMATURE NEUTOROPHIL: 0.04 K/CU MM
TOTAL NUCLEATED RBC: 0 K/CU MM
TRANSFUSION STATUS: NORMAL
TRANSFUSION STATUS: NORMAL
UNIT DIVISION: 0
UNIT DIVISION: 0
UNIT NUMBER: NORMAL
UNIT NUMBER: NORMAL
WBC # BLD: 5.9 K/CU MM (ref 4–10.5)

## 2021-12-05 PROCEDURE — 2580000003 HC RX 258: Performed by: NURSE PRACTITIONER

## 2021-12-05 PROCEDURE — 96366 THER/PROPH/DIAG IV INF ADDON: CPT

## 2021-12-05 PROCEDURE — G0378 HOSPITAL OBSERVATION PER HR: HCPCS

## 2021-12-05 PROCEDURE — 99223 1ST HOSP IP/OBS HIGH 75: CPT | Performed by: INTERNAL MEDICINE

## 2021-12-05 PROCEDURE — 80048 BASIC METABOLIC PNL TOTAL CA: CPT

## 2021-12-05 PROCEDURE — 2580000003 HC RX 258: Performed by: INTERNAL MEDICINE

## 2021-12-05 PROCEDURE — 94761 N-INVAS EAR/PLS OXIMETRY MLT: CPT

## 2021-12-05 PROCEDURE — 96375 TX/PRO/DX INJ NEW DRUG ADDON: CPT

## 2021-12-05 PROCEDURE — 1200000000 HC SEMI PRIVATE

## 2021-12-05 PROCEDURE — C9113 INJ PANTOPRAZOLE SODIUM, VIA: HCPCS | Performed by: INTERNAL MEDICINE

## 2021-12-05 PROCEDURE — 96365 THER/PROPH/DIAG IV INF INIT: CPT

## 2021-12-05 PROCEDURE — 36415 COLL VENOUS BLD VENIPUNCTURE: CPT

## 2021-12-05 PROCEDURE — 85018 HEMOGLOBIN: CPT

## 2021-12-05 PROCEDURE — 6360000002 HC RX W HCPCS: Performed by: INTERNAL MEDICINE

## 2021-12-05 PROCEDURE — 85014 HEMATOCRIT: CPT

## 2021-12-05 PROCEDURE — 85025 COMPLETE CBC W/AUTO DIFF WBC: CPT

## 2021-12-05 PROCEDURE — 6370000000 HC RX 637 (ALT 250 FOR IP): Performed by: INTERNAL MEDICINE

## 2021-12-05 PROCEDURE — 6370000000 HC RX 637 (ALT 250 FOR IP): Performed by: NURSE PRACTITIONER

## 2021-12-05 PROCEDURE — 82962 GLUCOSE BLOOD TEST: CPT

## 2021-12-05 RX ORDER — PANTOPRAZOLE SODIUM 40 MG/10ML
40 INJECTION, POWDER, LYOPHILIZED, FOR SOLUTION INTRAVENOUS 2 TIMES DAILY
Status: DISCONTINUED | OUTPATIENT
Start: 2021-12-05 | End: 2021-12-08 | Stop reason: HOSPADM

## 2021-12-05 RX ORDER — PANTOPRAZOLE SODIUM 40 MG/10ML
40 INJECTION, POWDER, LYOPHILIZED, FOR SOLUTION INTRAVENOUS 2 TIMES DAILY
Status: DISCONTINUED | OUTPATIENT
Start: 2021-12-05 | End: 2021-12-05 | Stop reason: CLARIF

## 2021-12-05 RX ORDER — PANTOPRAZOLE SODIUM 40 MG/1
40 TABLET, DELAYED RELEASE ORAL
Status: DISCONTINUED | OUTPATIENT
Start: 2021-12-05 | End: 2021-12-05

## 2021-12-05 RX ADMIN — ROPINIROLE HYDROCHLORIDE 1 MG: 1 TABLET, FILM COATED ORAL at 22:10

## 2021-12-05 RX ADMIN — IRON SUCROSE 500 MG: 20 INJECTION, SOLUTION INTRAVENOUS at 11:28

## 2021-12-05 RX ADMIN — POLYETHYLENE GLYCOL (3350) 17 G: 17 POWDER, FOR SOLUTION ORAL at 09:49

## 2021-12-05 RX ADMIN — ROPINIROLE HYDROCHLORIDE 1 MG: 1 TABLET, FILM COATED ORAL at 09:48

## 2021-12-05 RX ADMIN — FINASTERIDE 5 MG: 5 TABLET, FILM COATED ORAL at 09:48

## 2021-12-05 RX ADMIN — GLIPIZIDE 10 MG: 5 TABLET ORAL at 06:07

## 2021-12-05 RX ADMIN — SODIUM CHLORIDE, PRESERVATIVE FREE 10 ML: 5 INJECTION INTRAVENOUS at 23:35

## 2021-12-05 RX ADMIN — LEVOTHYROXINE SODIUM 150 MCG: 25 TABLET ORAL at 06:13

## 2021-12-05 RX ADMIN — INSULIN LISPRO 1 UNITS: 100 INJECTION, SOLUTION INTRAVENOUS; SUBCUTANEOUS at 16:25

## 2021-12-05 RX ADMIN — MELATONIN TAB 3 MG 3 MG: 3 TAB at 22:10

## 2021-12-05 RX ADMIN — INSULIN LISPRO 3 UNITS: 100 INJECTION, SOLUTION INTRAVENOUS; SUBCUTANEOUS at 11:37

## 2021-12-05 RX ADMIN — GEMFIBROZIL 600 MG: 600 TABLET, FILM COATED ORAL at 06:07

## 2021-12-05 RX ADMIN — ROPINIROLE HYDROCHLORIDE 1 MG: 1 TABLET, FILM COATED ORAL at 13:54

## 2021-12-05 RX ADMIN — TRAZODONE HYDROCHLORIDE 50 MG: 50 TABLET ORAL at 22:10

## 2021-12-05 RX ADMIN — PANTOPRAZOLE SODIUM 40 MG: 40 INJECTION, POWDER, FOR SOLUTION INTRAVENOUS at 16:51

## 2021-12-05 RX ADMIN — PANTOPRAZOLE SODIUM 40 MG: 40 TABLET, DELAYED RELEASE ORAL at 09:59

## 2021-12-05 RX ADMIN — TRAMADOL HYDROCHLORIDE 50 MG: 50 TABLET, COATED ORAL at 02:14

## 2021-12-05 RX ADMIN — SIMVASTATIN 40 MG: 20 TABLET, FILM COATED ORAL at 22:10

## 2021-12-05 RX ADMIN — GEMFIBROZIL 600 MG: 600 TABLET, FILM COATED ORAL at 15:50

## 2021-12-05 RX ADMIN — TAMSULOSIN HYDROCHLORIDE 0.4 MG: 0.4 CAPSULE ORAL at 22:10

## 2021-12-05 RX ADMIN — SODIUM CHLORIDE, PRESERVATIVE FREE 10 ML: 5 INJECTION INTRAVENOUS at 09:49

## 2021-12-05 RX ADMIN — TRAMADOL HYDROCHLORIDE 50 MG: 50 TABLET, COATED ORAL at 06:13

## 2021-12-05 RX ADMIN — INSULIN LISPRO 2 UNITS: 100 INJECTION, SOLUTION INTRAVENOUS; SUBCUTANEOUS at 10:00

## 2021-12-05 RX ADMIN — CITALOPRAM HYDROBROMIDE 10 MG: 20 TABLET ORAL at 09:48

## 2021-12-05 ASSESSMENT — PAIN DESCRIPTION - ORIENTATION: ORIENTATION: RIGHT;LEFT

## 2021-12-05 ASSESSMENT — PAIN DESCRIPTION - FREQUENCY: FREQUENCY: CONTINUOUS

## 2021-12-05 ASSESSMENT — PAIN SCALES - GENERAL
PAINLEVEL_OUTOF10: 0
PAINLEVEL_OUTOF10: 5
PAINLEVEL_OUTOF10: 8
PAINLEVEL_OUTOF10: 8
PAINLEVEL_OUTOF10: 2

## 2021-12-05 ASSESSMENT — PAIN DESCRIPTION - PAIN TYPE: TYPE: ACUTE PAIN

## 2021-12-05 ASSESSMENT — PAIN DESCRIPTION - DESCRIPTORS: DESCRIPTORS: ACHING

## 2021-12-05 NOTE — PROGRESS NOTES
CHART REVIEWED AND PT EXAMINED APPRECIATE DR DUMONT NOTE  PT HEMODYNAMICALLY STABLE  VITALS STABLE   WILL PLAN FOR EGD AND REPEAT COLONOSCOPY FOR W/U PTS ANEMIA AND GIT BLEEDING PT AGREEABLE

## 2021-12-05 NOTE — PLAN OF CARE
Problem: Pain:  Goal: Pain level will decrease  Description: Pain level will decrease  Outcome: Ongoing  Goal: Control of acute pain  Description: Control of acute pain  12/5/2021 0341 by Candelaria Rodriguez RN  Outcome: Ongoing  12/5/2021 0340 by Candelaria Rodriguez RN  Outcome: Met This Shift  Goal: Control of chronic pain  Description: Control of chronic pain  Outcome: Ongoing     Problem: Falls - Risk of:  Goal: Will remain free from falls  Description: Will remain free from falls  12/5/2021 0341 by Candelaria Rodriguez RN  Outcome: Ongoing  12/5/2021 0340 by Candelaria Rodriguez RN  Outcome: Ongoing  Goal: Absence of physical injury  Description: Absence of physical injury  12/5/2021 0341 by Candelaria Rodriguez RN  Outcome: Ongoing  12/5/2021 0340 by Candelaria Rodriguez RN  Outcome: Ongoing     Problem: Discharge Planning:  Goal: Participates in care planning  Description: Participates in care planning  Outcome: Ongoing  Goal: Discharged to appropriate level of care  Description: Discharged to appropriate level of care  Outcome: Ongoing

## 2021-12-05 NOTE — ED NOTES
End of shift report given to Sola Livingston, [address] [place] [address].       Zulay Morales RN  12/04/21 1936

## 2021-12-06 ENCOUNTER — ANESTHESIA EVENT (OUTPATIENT)
Dept: ENDOSCOPY | Age: 76
DRG: 394 | End: 2021-12-06
Payer: MEDICARE

## 2021-12-06 LAB
ANION GAP SERPL CALCULATED.3IONS-SCNC: 11 MMOL/L (ref 4–16)
BASOPHILS ABSOLUTE: 0 K/CU MM
BASOPHILS RELATIVE PERCENT: 0.4 % (ref 0–1)
BUN BLDV-MCNC: 11 MG/DL (ref 6–23)
CALCIUM SERPL-MCNC: 8.5 MG/DL (ref 8.3–10.6)
CHLORIDE BLD-SCNC: 100 MMOL/L (ref 99–110)
CO2: 26 MMOL/L (ref 21–32)
CREAT SERPL-MCNC: 0.8 MG/DL (ref 0.9–1.3)
DIFFERENTIAL TYPE: ABNORMAL
EOSINOPHILS ABSOLUTE: 0.4 K/CU MM
EOSINOPHILS RELATIVE PERCENT: 6.6 % (ref 0–3)
GFR AFRICAN AMERICAN: >60 ML/MIN/1.73M2
GFR NON-AFRICAN AMERICAN: >60 ML/MIN/1.73M2
GLUCOSE BLD-MCNC: 204 MG/DL (ref 70–99)
GLUCOSE BLD-MCNC: 223 MG/DL (ref 70–99)
GLUCOSE BLD-MCNC: 235 MG/DL (ref 70–99)
GLUCOSE BLD-MCNC: 251 MG/DL (ref 70–99)
GLUCOSE BLD-MCNC: 328 MG/DL (ref 70–99)
HCT VFR BLD CALC: 29.5 % (ref 42–52)
HCT VFR BLD CALC: 32 % (ref 42–52)
HCT VFR BLD CALC: 32.1 % (ref 42–52)
HEMOGLOBIN: 8.6 GM/DL (ref 13.5–18)
HEMOGLOBIN: 8.9 GM/DL (ref 13.5–18)
HEMOGLOBIN: 9.5 GM/DL (ref 13.5–18)
IMMATURE NEUTROPHIL %: 0.9 % (ref 0–0.43)
LYMPHOCYTES ABSOLUTE: 0.9 K/CU MM
LYMPHOCYTES RELATIVE PERCENT: 15.7 % (ref 24–44)
MCH RBC QN AUTO: 22.3 PG (ref 27–31)
MCHC RBC AUTO-ENTMCNC: 29.2 % (ref 32–36)
MCV RBC AUTO: 76.4 FL (ref 78–100)
MONOCYTES ABSOLUTE: 0.4 K/CU MM
MONOCYTES RELATIVE PERCENT: 7.9 % (ref 0–4)
NUCLEATED RBC %: 0 %
PDW BLD-RTO: 23.6 % (ref 11.7–14.9)
PLATELET # BLD: 398 K/CU MM (ref 140–440)
PMV BLD AUTO: 9.9 FL (ref 7.5–11.1)
POTASSIUM SERPL-SCNC: 4.6 MMOL/L (ref 3.5–5.1)
RBC # BLD: 3.86 M/CU MM (ref 4.6–6.2)
SARS-COV-2, NAAT: NOT DETECTED
SEGMENTED NEUTROPHILS ABSOLUTE COUNT: 3.8 K/CU MM
SEGMENTED NEUTROPHILS RELATIVE PERCENT: 68.5 % (ref 36–66)
SODIUM BLD-SCNC: 137 MMOL/L (ref 135–145)
SOURCE: NORMAL
TOTAL IMMATURE NEUTOROPHIL: 0.05 K/CU MM
TOTAL NUCLEATED RBC: 0 K/CU MM
WBC # BLD: 5.6 K/CU MM (ref 4–10.5)

## 2021-12-06 PROCEDURE — 1200000000 HC SEMI PRIVATE

## 2021-12-06 PROCEDURE — 80048 BASIC METABOLIC PNL TOTAL CA: CPT

## 2021-12-06 PROCEDURE — 6370000000 HC RX 637 (ALT 250 FOR IP): Performed by: NURSE PRACTITIONER

## 2021-12-06 PROCEDURE — 2580000003 HC RX 258: Performed by: NURSE PRACTITIONER

## 2021-12-06 PROCEDURE — 6360000002 HC RX W HCPCS: Performed by: INTERNAL MEDICINE

## 2021-12-06 PROCEDURE — G0378 HOSPITAL OBSERVATION PER HR: HCPCS

## 2021-12-06 PROCEDURE — 82962 GLUCOSE BLOOD TEST: CPT

## 2021-12-06 PROCEDURE — 96376 TX/PRO/DX INJ SAME DRUG ADON: CPT

## 2021-12-06 PROCEDURE — 85025 COMPLETE CBC W/AUTO DIFF WBC: CPT

## 2021-12-06 PROCEDURE — 96366 THER/PROPH/DIAG IV INF ADDON: CPT

## 2021-12-06 PROCEDURE — 85014 HEMATOCRIT: CPT

## 2021-12-06 PROCEDURE — 87635 SARS-COV-2 COVID-19 AMP PRB: CPT

## 2021-12-06 PROCEDURE — 36415 COLL VENOUS BLD VENIPUNCTURE: CPT

## 2021-12-06 PROCEDURE — 2580000003 HC RX 258: Performed by: INTERNAL MEDICINE

## 2021-12-06 PROCEDURE — 85018 HEMOGLOBIN: CPT

## 2021-12-06 PROCEDURE — C9113 INJ PANTOPRAZOLE SODIUM, VIA: HCPCS | Performed by: INTERNAL MEDICINE

## 2021-12-06 PROCEDURE — 6370000000 HC RX 637 (ALT 250 FOR IP): Performed by: SPECIALIST

## 2021-12-06 RX ADMIN — ROPINIROLE HYDROCHLORIDE 1 MG: 1 TABLET, FILM COATED ORAL at 09:08

## 2021-12-06 RX ADMIN — IRON SUCROSE 500 MG: 20 INJECTION, SOLUTION INTRAVENOUS at 12:00

## 2021-12-06 RX ADMIN — CITALOPRAM HYDROBROMIDE 10 MG: 20 TABLET ORAL at 09:08

## 2021-12-06 RX ADMIN — SIMVASTATIN 40 MG: 20 TABLET, FILM COATED ORAL at 22:23

## 2021-12-06 RX ADMIN — MELATONIN TAB 3 MG 3 MG: 3 TAB at 22:23

## 2021-12-06 RX ADMIN — GLIPIZIDE 10 MG: 5 TABLET ORAL at 09:17

## 2021-12-06 RX ADMIN — TRAZODONE HYDROCHLORIDE 50 MG: 50 TABLET ORAL at 22:23

## 2021-12-06 RX ADMIN — LEVOTHYROXINE SODIUM 150 MCG: 25 TABLET ORAL at 06:35

## 2021-12-06 RX ADMIN — TAMSULOSIN HYDROCHLORIDE 0.4 MG: 0.4 CAPSULE ORAL at 22:23

## 2021-12-06 RX ADMIN — GEMFIBROZIL 600 MG: 600 TABLET, FILM COATED ORAL at 17:37

## 2021-12-06 RX ADMIN — PANTOPRAZOLE SODIUM 40 MG: 40 INJECTION, POWDER, FOR SOLUTION INTRAVENOUS at 17:37

## 2021-12-06 RX ADMIN — TRAMADOL HYDROCHLORIDE 50 MG: 50 TABLET, COATED ORAL at 22:32

## 2021-12-06 RX ADMIN — ROPINIROLE HYDROCHLORIDE 1 MG: 1 TABLET, FILM COATED ORAL at 18:38

## 2021-12-06 RX ADMIN — POLYETHYLENE GLYCOL 3350, SODIUM SULFATE ANHYDROUS, SODIUM BICARBONATE, SODIUM CHLORIDE, POTASSIUM CHLORIDE 4000 ML: 236; 22.74; 6.74; 5.86; 2.97 POWDER, FOR SOLUTION ORAL at 18:32

## 2021-12-06 RX ADMIN — SODIUM CHLORIDE 250 ML: 9 INJECTION, SOLUTION INTRAVENOUS at 12:00

## 2021-12-06 RX ADMIN — INSULIN LISPRO 2 UNITS: 100 INJECTION, SOLUTION INTRAVENOUS; SUBCUTANEOUS at 09:08

## 2021-12-06 RX ADMIN — ROPINIROLE HYDROCHLORIDE 1 MG: 1 TABLET, FILM COATED ORAL at 13:11

## 2021-12-06 RX ADMIN — INSULIN LISPRO 4 UNITS: 100 INJECTION, SOLUTION INTRAVENOUS; SUBCUTANEOUS at 12:11

## 2021-12-06 RX ADMIN — SODIUM CHLORIDE, PRESERVATIVE FREE 10 ML: 5 INJECTION INTRAVENOUS at 09:13

## 2021-12-06 RX ADMIN — POLYETHYLENE GLYCOL (3350) 17 G: 17 POWDER, FOR SOLUTION ORAL at 09:24

## 2021-12-06 RX ADMIN — INSULIN LISPRO 3 UNITS: 100 INJECTION, SOLUTION INTRAVENOUS; SUBCUTANEOUS at 17:38

## 2021-12-06 RX ADMIN — FINASTERIDE 5 MG: 5 TABLET, FILM COATED ORAL at 09:08

## 2021-12-06 RX ADMIN — PANTOPRAZOLE SODIUM 40 MG: 40 INJECTION, POWDER, FOR SOLUTION INTRAVENOUS at 06:35

## 2021-12-06 RX ADMIN — GEMFIBROZIL 600 MG: 600 TABLET, FILM COATED ORAL at 09:08

## 2021-12-06 RX ADMIN — SODIUM CHLORIDE, PRESERVATIVE FREE 10 ML: 5 INJECTION INTRAVENOUS at 22:33

## 2021-12-06 ASSESSMENT — LIFESTYLE VARIABLES: SMOKING_STATUS: 1

## 2021-12-06 ASSESSMENT — PAIN SCALES - GENERAL
PAINLEVEL_OUTOF10: 5
PAINLEVEL_OUTOF10: 6

## 2021-12-06 NOTE — PLAN OF CARE
Problem: Pain:  Goal: Pain level will decrease  Description: Pain level will decrease  Outcome: Ongoing  Goal: Control of acute pain  Description: Control of acute pain  Outcome: Ongoing  Goal: Control of chronic pain  Description: Control of chronic pain  Outcome: Ongoing     Problem: Falls - Risk of:  Goal: Will remain free from falls  Description: Will remain free from falls  Outcome: Ongoing  Goal: Absence of physical injury  Description: Absence of physical injury  Outcome: Ongoing     Problem: Discharge Planning:  Goal: Participates in care planning  Description: Participates in care planning  Outcome: Ongoing  Goal: Discharged to appropriate level of care  Description: Discharged to appropriate level of care  Outcome: Ongoing

## 2021-12-06 NOTE — PROGRESS NOTES
Called Pema HOLCOMB to inform of patients endo procedures scheduled tomorrow at 0700. She agreed to collect and send rapid covid today for patient.

## 2021-12-06 NOTE — PROGRESS NOTES
DOING FAIR NO ACTIVE GIT BLEEDING  VITALS STABLE   LABS NOTED HB 8.9  WILL CPM EGD / COLONOSCOPY IN AM

## 2021-12-06 NOTE — FLOWSHEET NOTE
12/06/21 1451   Vital Signs   Temp 98.3 °F (36.8 °C)   Temp Source Oral   Pulse 85   Heart Rate Source Telemetry   Resp 18   BP (!) 151/83   BP Location Right upper arm   MAP (mmHg) 104   Patient Position Sitting   Level of Consciousness Alert (0)   MEWS Score 1   Oxygen Therapy   SpO2 96 %   O2 Device None (Room air)

## 2021-12-06 NOTE — ANESTHESIA PRE PROCEDURE
Department of Anesthesiology  Preprocedure Note       Name:  Deonna Thompson   Age:  68 y.o.  :  1945                                          MRN:  [id number]         Date:  2021      Surgeon: Nadeem Quiroz):  Richard Corado MD    Procedure: Procedure(s):  CHOLECYSTECTOMY LAPAROSCOPIC    Medications prior to admission:   Prior to Admission medications    Medication Sig Start Date End Date Taking? Authorizing Provider   leuprolide (LUPRON) 45 MG injection Inject into the muscle    Historical Provider, MD   rOPINIRole (REQUIP) 1 MG tablet Take 1 mg by mouth 3 times daily 12/3/21 1/2/22  Historical Provider, MD   finasteride (PROSCAR) 5 MG tablet Take 5 mg by mouth daily 21   Historical Provider, MD   polyethylene glycol (GLYCOLAX) 17 GM/SCOOP powder Take 17 g by mouth daily    Historical Provider, MD   ferrous sulfate (IRON 325) 325 (65 Fe) MG tablet Take 325 mg by mouth daily 12/3/21 3/3/22  Historical Provider, MD   tamsulosin (FLOMAX) 0.4 MG capsule Take 0.4 mg by mouth daily 21   Historical Provider, MD   cetirizine (ZYRTEC) 10 MG tablet Take by mouth    Historical Provider, MD   citalopram (CELEXA) 20 MG tablet TAKE 1 TABLET BY MOUTH  DAILY WITH DINNER 20   Historical Provider, MD   levothyroxine (SYNTHROID) 150 MCG tablet 1 tab a day for 6 days a week and 1.5 tab on Sundays. 18   Historical Provider, MD   gemfibrozil (LOPID) 600 MG tablet Take 600 mg by mouth 2 times daily 21   Historical Provider, MD   metFORMIN (GLUCOPHAGE) 500 MG tablet TAKE 1 TABLET BY MOUTH  TWICE DAILY 3/14/20   Historical Provider, MD   metFORMIN (GLUCOPHAGE) 500 MG tablet TAKE 1 TABLET BY MOUTH IN THE MORNING AND 1 WITH EVENING MEAL 3/14/20   Historical Provider, MD   traMADol (ULTRAM) 50 MG tablet Take 50 mg by mouth every 4 hours as needed.     Historical Provider, MD   cyclobenzaprine (FLEXERIL) 10 MG tablet TAKE 1 TABLET BY MOUTH AT BEDTIME AS NEEDED 3/9/20   Historical Provider, MD   cetirizine (ZYRTEC) 10 MG tablet Take by mouth    Historical Provider, MD   traZODone (DESYREL) 50 MG tablet Take 50 mg by mouth nightly    Historical Provider, MD   Magnesium Oxide 400 MG CAPS Take by mouth 10/12/16   Historical Provider, MD   pseudoephedrine (SUDAFED) 30 MG tablet Take by mouth PRN    Historical Provider, MD   Multiple Vitamins-Minerals (FITNESS TABS FOR MEN AM/PM PO) Take by mouth    Historical Provider, MD   Omega-3 Fatty Acids (OMEGA-3 FISH OIL) 1000 MG CAPS Take by mouth    Historical Provider, MD   fluticasone (FLONASE) 50 MCG/ACT nasal spray 1 spray by Each Nare route daily    Historical Provider, MD   diphenhydrAMINE (BENADRYL) 25 MG tablet Take 25 mg by mouth every 6 hours as needed for Itching    Historical Provider, MD   levothyroxine (SYNTHROID) 150 MCG tablet Take 150 mcg by mouth daily    Historical Provider, MD   calcium 600 MG TABS tablet Take 500 mg by mouth 3 times daily 1800 total    Historical Provider, MD   melatonin 3 MG TABS tablet Take 3 mg by mouth daily    Historical Provider, MD   glipiZIDE (GLUCOTROL) 10 MG tablet Take 10 mg by mouth every morning (before breakfast)     Historical Provider, MD   gemfibrozil (LOPID) 600 MG tablet Take 600 mg by mouth 2 times daily (before meals). Historical Provider, MD   simvastatin (ZOCOR) 40 MG tablet Take 40 mg by mouth nightly. Historical Provider, MD   aspirin 325 MG tablet Take 325 mg by mouth daily Stopped on 2/20/2020 9/11/21  Historical Provider, MD       Current medications:    No current facility-administered medications for this visit. No current outpatient medications on file.      Facility-Administered Medications Ordered in Other Visits   Medication Dose Route Frequency Provider Last Rate Last Admin    iron sucrose (VENOFER) 500 mg in sodium chloride 0.9 % 250 mL IVPB  500 mg IntraVENous Once Rocky Hollins MD        pantoprazole (PROTONIX) injection 40 mg  40 mg IntraVENous BID Panfilo Daniels MD   40 mg at 12/06/21 8681    cetirizine (ZYRTEC) tablet 10 mg  10 mg Oral Daily PRN Livan Akchristiano, APRN - CNP        citalopram (CELEXA) tablet 10 mg  10 mg Oral Daily Livan Akaeze, APRN - CNP   10 mg at 12/06/21 0908    cyclobenzaprine (FLEXERIL) tablet 10 mg  10 mg Oral BID PRN Lee Smith APRN - CNP   10 mg at 12/04/21 2203    [Held by provider] ferrous sulfate (IRON 325) tablet 325 mg  325 mg Oral Daily Livan Akinoze, APRN - CNP   325 mg at 12/04/21 1822    finasteride (PROSCAR) tablet 5 mg  5 mg Oral Daily Livan Akinoze, APRN - CNP   5 mg at 12/06/21 0908    fluticasone (FLONASE) 50 MCG/ACT nasal spray 1 spray  1 spray Each Nostril Daily Livan Guaman, APRN - CNP        gemfibrozil (LOPID) tablet 600 mg  600 mg Oral BID AC Livan Akchristiano, APRN - CNP   600 mg at 12/06/21 0908    glipiZIDE (GLUCOTROL) tablet 10 mg  10 mg Oral QAM AC Livan Akchristiano, APRN - CNP   10 mg at 12/06/21 0917    levothyroxine (SYNTHROID) tablet 150 mcg  150 mcg Oral Daily Livan Akaeze, APRN - CNP   150 mcg at 12/06/21 0635    melatonin tablet 3 mg  3 mg Oral Daily Livan Akinoze, APRN - CNP   3 mg at 12/05/21 2210    rOPINIRole (REQUIP) tablet 1 mg  1 mg Oral TID Livan Guaman, APRN - CNP   1 mg at 12/06/21 0908    simvastatin (ZOCOR) tablet 40 mg  40 mg Oral Nightly Livan Akinoze, APRN - CNP   40 mg at 12/05/21 2210    tamsulosin (FLOMAX) capsule 0.4 mg  0.4 mg Oral Daily Livan Akaeze, APRN - CNP   0.4 mg at 12/05/21 2210    traMADol (ULTRAM) tablet 50 mg  50 mg Oral Q4H PRN Livan Akchristiano, APRN - CNP   50 mg at 12/05/21 8806    traZODone (DESYREL) tablet 50 mg  50 mg Oral Nightly Livan Akaeze, APRN - CNP   50 mg at 12/05/21 2210    sodium chloride flush 0.9 % injection 5-40 mL  5-40 mL IntraVENous 2 times per day Livan Akaeze, APRN - CNP   10 mL at 12/06/21 0913    sodium chloride flush 0.9 % injection 5-40 mL  5-40 mL IntraVENous PRN Livan Akaeze, APRN - CNP        0.9 % sodium chloride infusion  25 mL IntraVENous PRN Livan Irene Bonilla, APRN - CNP        ondansetron (ZOFRAN-ODT) disintegrating tablet 4 mg  4 mg Oral Q8H PRN Livan Akaeze, APRN - CNP        Or    ondansetron (ZOFRAN) injection 4 mg  4 mg IntraVENous Q6H PRN Livan Akaeze, APRN - CNP        polyethylene glycol (GLYCOLAX) packet 17 g  17 g Oral Daily PRN Livan Akaeze, APRN - CNP        acetaminophen (TYLENOL) tablet 650 mg  650 mg Oral Q6H PRN Livan Akaeze, APRN - CNP        Or    acetaminophen (TYLENOL) suppository 650 mg  650 mg Rectal Q6H PRN Livan Akaeze, APRN - CNP        glucose (GLUTOSE) 40 % oral gel 15 g  15 g Oral PRN Livan Akaeze, APRN - CNP        dextrose 50 % IV solution  12.5 g IntraVENous PRN Livan Akaeze, APRN - CNP        glucagon (rDNA) injection 1 mg  1 mg IntraMUSCular PRN Livan Akaeze, APRN - CNP        dextrose 5 % solution  100 mL/hr IntraVENous PRN Livan Akaeze, APRN - CNP        insulin lispro (HUMALOG) injection vial 0-6 Units  0-6 Units SubCUTAneous TID WC Livan Akaeze, APRN - CNP   2 Units at 12/06/21 0908    insulin lispro (HUMALOG) injection vial 0-3 Units  0-3 Units SubCUTAneous Nightly Livan Akaeze, APRN - CNP   2 Units at 12/05/21 2210    polyethylene glycol (GLYCOLAX) packet 17 g  17 g Oral Daily Livan Akaeze, APRN - CNP   17 g at 12/06/21 4111       Allergies:     Allergies   Allergen Reactions    Seasonal      Dust, dander,pollen    Ciprofloxacin Hives    Pcn [Penicillins] Hives       Problem List:    Patient Active Problem List   Diagnosis Code    History of colon polyps Z86.010    Cataract H26.9    Erectile dysfunction N52.9    Type 2 diabetes mellitus with complication, without long-term current use of insulin (HCC) E11.8    Arthritis M19.90    Hyperlipidemia E78.5    Cholelithiasis K80.20    Idiopathic chronic pancreatitis (HCC) K86.1    Gallstone pancreatitis K85.10    Rupture of left quadriceps muscle S76.112A    Acute blood loss anemia D62    Rectal bleed K62.5    GI bleed K92.2    Acquired hypothyroidism E03.9    MYKEL on CPAP G47.33, Z99.89       Past Medical History:        Diagnosis Date    Acquired hypothyroidism 2021    Arthritis     BPH (benign prostatic hyperplasia)     CPAP (continuous positive airway pressure) dependence     Diabetes mellitus ([address]     dx     Erectile dysfunction     Hyperlipidemia     Sleep apnea     had sleep study done =- uses cpap nightly    Thyroid cancer ([address] 2018    Total thyroidectomy     Wears dentures     upper    Wears glasses     \"just to read\"       Past Surgical History:        Procedure Laterality Date    APPENDECTOMY  2006    CARPAL TUNNEL RELEASE Right 2014    CATARACT REMOVAL Bilateral 2015    CHOLECYSTECTOMY  10/11/2018    COLONOSCOPY      COLONOSCOPY  2013    EYE SURGERY Right     cataract extraction with implant/ fritz \"eyelid lift-     HERNIA REPAIR Right 's    inguinal    LEG MUSCLE SURGERY Left 2020    LEFT QUADRICEPS REPAIR performed by Polly Dunaway DO at [address] [facility] N/A 10/11/2018    CHOLECYSTECTOMY LAPAROSCOPIC performed by Taylor Sosa MD at [facility] [address] Left 2020     Repair of left quadriceps tendon using [employer]    ROTATOR CUFF REPAIR Left        Social History:    Social History     Tobacco Use    Smoking status: Former Smoker     Types: Cigarettes     Quit date: 1996     Years since quittin.2    Smokeless tobacco: Never Used    Tobacco comment: \"use the vapor cigarette occ not on regular basis\"   Substance Use Topics    Alcohol use: No     Comment: \"use to drink in regular basis- quit age 42's                                Counseling given: Not Answered  Comment: \"use the vapor cigarette occ not on regular basis\"      Vital Signs (Current): There were no vitals filed for this visit.                                            BP Readings from Last 3 Encounters:   21 110/63 12/04/21 (!) 164/72   09/11/21 (!) 173/83       NPO Status:                                                                                 BMI:   Wt Readings from Last 3 Encounters:   12/04/21 190 lb (86.2 kg)   12/04/21 190 lb (86.2 kg)   09/11/21 190 lb (86.2 kg)     There is no height or weight on file to calculate BMI.    CBC:   Lab Results   Component Value Date    WBC 5.6 12/06/2021    RBC 3.86 12/06/2021    HGB 8.6 12/06/2021    HCT 29.5 12/06/2021    MCV 76.4 12/06/2021    RDW 23.6 12/06/2021     12/06/2021       CMP:   Lab Results   Component Value Date     12/06/2021    K 4.6 12/06/2021     12/06/2021    CO2 26 12/06/2021    BUN 11 12/06/2021    CREATININE 0.8 12/06/2021    GFRAA >60 12/06/2021    LABGLOM >60 12/06/2021    GLUCOSE 223 12/06/2021    PROT 6.5 12/03/2021    CALCIUM 8.5 12/06/2021    BILITOT 0.2 12/03/2021    ALKPHOS 65 12/03/2021    AST 47 12/03/2021    ALT 36 12/03/2021       POC Tests:   Recent Labs     12/06/21  1141   POCGLU 328*       Coags:   Lab Results   Component Value Date    PROTIME 12.7 12/03/2021    INR 1.02 12/03/2021    APTT 26.9 12/03/2021       HCG (If Applicable): No results found for: PREGTESTUR, PREGSERUM, HCG, HCGQUANT     ABGs: No results found for: PHART, PO2ART, RZW1CGD, MHT3SVY, BEART, Y2JEIEBW     Type & Screen (If Applicable):  No results found for: LABABO, [address]    Anesthesia Evaluation  Patient summary reviewed and Nursing notes reviewed  Airway: Mallampati: III  TM distance: <3 FB   Neck ROM: limited  Mouth opening: > = 3 FB Dental:    (+) upper dentures      Pulmonary:normal exam    (+) sleep apnea: on CPAP,  current smoker          Patient smoked on day of surgery.                  Cardiovascular:  Exercise tolerance: good (>4 METS),   (+) hyperlipidemia      ECG reviewed               Beta Blocker:  Not on Beta Blocker         Neuro/Psych:   Negative Neuro/Psych ROS              GI/Hepatic/Renal:   (+) PUD, liver disease:,          ROS comment: Chronic idiopathic pancreatitis . Endo/Other:    (+) DiabetesType II DM, well controlled, , hypothyroidism, blood dyscrasia: anemia, arthritis: OA., malignancy/cancer. Pt had no PAT visit        ROS comment: Thyroid cA Abdominal:             Vascular: negative vascular ROS. Other Findings:             Anesthesia Plan      MAC     ASA 3     (Chart review only 12/6/21  covid pending )  Induction: intravenous.                         NILSON Multani - DEBI   12/6/2021

## 2021-12-06 NOTE — FLOWSHEET NOTE
Patient called staff into room c/o dizziness after having BM. Patient states feels weaker than usual but still has to go to the bathroom for another BM. Assisted patient x1 with walker. While patient on toilet vital signs taken. 12/06/21 0850   Vital Signs   Temp 98.1 °F (36.7 °C)   Temp Source Oral   Pulse 81   Heart Rate Source Monitor   Resp 16   BP (!) 75/37   BP Location Left upper arm   MAP (mmHg) (!) 49   Patient Position Sitting   Level of Consciousness Alert (0)   MEWS Score 3   Oxygen Therapy   SpO2 97 %       Patient had small amount of watery black stool. Assisted back to bed. Patient's BP improved while laying in bed. Patient denied any further symptoms during this shift.

## 2021-12-06 NOTE — PROGRESS NOTES
ATTENDING PHYSICIAN'S PROGRESS NOTES    Patient:  Marci Al      Unit/Bed:1109/1109-A    YOB: 1945    MRN: [id number]     Acct: [de-identified]     Admit date: 12/4/2021    Patient Seen, Chart, Consults notes, Labs, Radiology studies reviewed. SUBJECTIVE:   Day 2 of stay with bright red blood per rectum with associated anemia, transfused with improvement in his hemoglobin from 5 to 8; and most recent (in last 24 hours) has had no more episodes. He denied any abdominal pain, tenesmus, fever or chills. Patient seen by GI consult with recommendations noted EGD and colonoscopy tomorrow. All other ROS negative except noted in HPI    Past, Family, Social History unchanged from admission. Diet:  ADULT DIET;  Clear Liquid  Diet NPO    Medications:  Scheduled Meds:   polyethylene glycol  4,000 mL Oral Once    pantoprazole  40 mg IntraVENous BID    citalopram  10 mg Oral Daily    [Held by provider] ferrous sulfate  325 mg Oral Daily    finasteride  5 mg Oral Daily    fluticasone  1 spray Each Nostril Daily    gemfibrozil  600 mg Oral BID AC    glipiZIDE  10 mg Oral QAM AC    levothyroxine  150 mcg Oral Daily    melatonin  3 mg Oral Daily    rOPINIRole  1 mg Oral TID    simvastatin  40 mg Oral Nightly    tamsulosin  0.4 mg Oral Daily    traZODone  50 mg Oral Nightly    sodium chloride flush  5-40 mL IntraVENous 2 times per day    insulin lispro  0-6 Units SubCUTAneous TID WC    insulin lispro  0-3 Units SubCUTAneous Nightly    polyethylene glycol  17 g Oral Daily     Continuous Infusions:   sodium chloride 250 mL (12/06/21 1200)    dextrose       PRN Meds:cetirizine, cyclobenzaprine, traMADol, sodium chloride flush, sodium chloride, ondansetron **OR** ondansetron, polyethylene glycol, acetaminophen **OR** acetaminophen, glucose, dextrose, glucagon (rDNA), dextrose    OBJECTIVE:    CBC:   Recent Labs     12/04/21  0600 12/04/21  0600 12/05/21  0513 12/05/21  1255 12/05/21  2136 12/06/21  0406 12/06/21  1225   WBC 6.5  --  5.9  --   --  5.6  --    HGB 8.3*   < > 8.0*   < > 8.6* 8.6* 8.9*     --  441*  --   --  398  --     < > = values in this interval not displayed. BMP:    Recent Labs     12/04/21  0600 12/04/21  0600 12/04/21  1649 12/05/21  0513 12/06/21  0406     --   --  139 137   K 4.3  --   --  4.5 4.6   CL 99  --   --  103 100   CO2 25  --   --  27 26   BUN 8  --   --  9 11   CREATININE 0.7*  --   --  0.8* 0.8*   GLUCOSE 205*   < > 272 220* 223*    < > = values in this interval not displayed. Calcium:  Recent Labs     12/06/21  0406   CALCIUM 8.5     Ionized Calcium:No results for input(s): IONCA in the last 72 hours. Magnesium:No results for input(s): MG in the last 72 hours. Phosphorus:No results for input(s): PHOS in the last 72 hours. BNP:No results for input(s): BNP in the last 72 hours. Glucose:  Recent Labs     12/06/21  0720 12/06/21  1141 12/06/21  1608   POCGLU 235* 328* 251*     HgbA1C: No results for input(s): LABA1C in the last 72 hours. INR:   Recent Labs     12/03/21  1820   INR 1.02     Hepatic:   Recent Labs     12/03/21  1820   ALKPHOS 65   ALT 36   AST 47*   PROT 6.5   BILITOT 0.2   LABALBU 3.9     Amylase and Lipase:No results for input(s): LACTA, AMYLASE in the last 72 hours. Lactic Acid: No results for input(s): LACTA in the last 72 hours. Troponin: No results for input(s): CKTOTAL, CKMB, TROPONINT in the last 72 hours. BNP: No results for input(s): BNP in the last 72 hours. Lipids: No results for input(s): CHOL, TRIG, HDL, LDLCALC in the last 72 hours. Invalid input(s): LDL  ABGs: No results found for: PH, PCO2, PO2, HCO3, O2SAT    Radiology reports as per the Radiologist  Radiology: No results found.      Physical Exam:  Vitals: /80   Pulse 85   Temp 98.3 °F (36.8 °C) (Oral)   Resp 18   Ht 5' 7\" (1.702 m)   Wt 190 lb (86.2 kg)   SpO2 96%   BMI 29.76 kg/m²   24 hour intake/output:    Intake/Output Summary (Last 24 hours) at 12/6/2021 1746  Last data filed at 12/6/2021 0913  Gross per 24 hour   Intake 5 ml   Output    Net 5 ml     Last 3 weights: Wt Readings from Last 3 Encounters:   12/04/21 190 lb (86.2 kg)   12/04/21 190 lb (86.2 kg)   09/11/21 190 lb (86.2 kg)       General appearance - alert, well appearing, and in no distress  HEENT: Normocephalic, Atraumatic, Conjuctiva pink, PERRL, Oral mucosa normal, Lips, teeth and gums normal, Trachea midline, Thyroid normal and No noted lymphadenopathy  Chest - clear to auscultation, no wheezes, rales or rhonchi, symmetric air entry  Cardiovascular - normal rate, regular rhythm, normal S1, S2, no murmurs, rubs, clicks or gallops  Abdomen - soft, nontender, nondistended, no masses or organomegaly   Neurological - Alert and oriented, Normal speech, No focal findings or movement disorder noted and Motor and sensory grossly normal bilaterally  Integumentary - Skin color, texture, turgor normal. No Rashes or lesions  Musculoskeletal -Full ROM times 4 extremities, No clubbing or cyanosis and No peripheral edema      DVT prophylaxis: [] Lovenox                                 [x] SCDs                                 [] SQ Heparin                                 [] Encourage ambulation           [] Already on Anticoagulation                 ASSESSMENT / PLAN :    Principal Problem:    GI bleeding with  Acute blood loss anemia  Seen by GI consult scheduled for EGD and colonoscopy tomorrow. Continue with IV Protonix. Iron deficiency anemia  Received a total of 1 g of Venofer IV. P.o. ferrous sulfate is on hold and needs to be restarted at discharge together with ascorbic acid to help with absorption. Active Problems:    Type 2 diabetes mellitus with complication, without long-term current use of insulin (HCC)  Utilize low-dose SSI as needed to optimize blood sugar control, maintain on ADA diet. Hold Metformin.       Hyperlipidemia  Continue with simvastatin as reordered      Acquired hypothyroidism  Maintain on home dose of Synthroid 150 mcg daily p.o. MYKEL on CPAP  Maintain on CPAP during daytime naps and nighttime sleep    Resolved Problems:    * No resolved hospital problems. *    Management as outlined, appreciate GI consult input. Monitor H&H and transfuse to maintain hemoglobin above 7, maintain in-house overnight with a.m. labs. So colonoscopy and EGD by GI consult tomorrow.     Electronically signed by Megan De La Cruz MD on 12/6/2021 at [address]

## 2021-12-06 NOTE — H&P
I have examined the patient immediately before the procedure and there is no change in the previous history or physical exam

## 2021-12-07 ENCOUNTER — ANESTHESIA (OUTPATIENT)
Dept: ENDOSCOPY | Age: 76
DRG: 394 | End: 2021-12-07
Payer: MEDICARE

## 2021-12-07 VITALS — DIASTOLIC BLOOD PRESSURE: 50 MMHG | SYSTOLIC BLOOD PRESSURE: 102 MMHG | OXYGEN SATURATION: 99 %

## 2021-12-07 LAB
ANION GAP SERPL CALCULATED.3IONS-SCNC: 16 MMOL/L (ref 4–16)
BASOPHILS ABSOLUTE: 0 K/CU MM
BASOPHILS RELATIVE PERCENT: 0.3 % (ref 0–1)
BUN BLDV-MCNC: 7 MG/DL (ref 6–23)
CALCIUM SERPL-MCNC: 9.1 MG/DL (ref 8.3–10.6)
CHLORIDE BLD-SCNC: 101 MMOL/L (ref 99–110)
CO2: 24 MMOL/L (ref 21–32)
CREAT SERPL-MCNC: 0.8 MG/DL (ref 0.9–1.3)
DIFFERENTIAL TYPE: ABNORMAL
EOSINOPHILS ABSOLUTE: 0.2 K/CU MM
EOSINOPHILS RELATIVE PERCENT: 2.6 % (ref 0–3)
GFR AFRICAN AMERICAN: >60 ML/MIN/1.73M2
GFR NON-AFRICAN AMERICAN: >60 ML/MIN/1.73M2
GLUCOSE BLD-MCNC: 148 MG/DL (ref 70–99)
GLUCOSE BLD-MCNC: 183 MG/DL (ref 70–99)
GLUCOSE BLD-MCNC: 195 MG/DL (ref 70–99)
GLUCOSE BLD-MCNC: 196 MG/DL (ref 70–99)
GLUCOSE BLD-MCNC: 215 MG/DL (ref 70–99)
HCT VFR BLD CALC: 30.3 % (ref 42–52)
HCT VFR BLD CALC: 30.6 % (ref 42–52)
HCT VFR BLD CALC: 33 % (ref 42–52)
HEMOGLOBIN: 8.7 GM/DL (ref 13.5–18)
HEMOGLOBIN: 8.8 GM/DL (ref 13.5–18)
HEMOGLOBIN: 9.6 GM/DL (ref 13.5–18)
IMMATURE NEUTROPHIL %: 0.7 % (ref 0–0.43)
LYMPHOCYTES ABSOLUTE: 0.9 K/CU MM
LYMPHOCYTES RELATIVE PERCENT: 14.5 % (ref 24–44)
MCH RBC QN AUTO: 22.3 PG (ref 27–31)
MCHC RBC AUTO-ENTMCNC: 29 % (ref 32–36)
MCV RBC AUTO: 76.9 FL (ref 78–100)
MONOCYTES ABSOLUTE: 0.4 K/CU MM
MONOCYTES RELATIVE PERCENT: 6.8 % (ref 0–4)
NUCLEATED RBC %: 0.3 %
PDW BLD-RTO: 25.2 % (ref 11.7–14.9)
PLATELET # BLD: 484 K/CU MM (ref 140–440)
PMV BLD AUTO: 9.6 FL (ref 7.5–11.1)
POTASSIUM SERPL-SCNC: 4 MMOL/L (ref 3.5–5.1)
RBC # BLD: 3.94 M/CU MM (ref 4.6–6.2)
SEGMENTED NEUTROPHILS ABSOLUTE COUNT: 4.4 K/CU MM
SEGMENTED NEUTROPHILS RELATIVE PERCENT: 75.1 % (ref 36–66)
SODIUM BLD-SCNC: 141 MMOL/L (ref 135–145)
TOTAL IMMATURE NEUTOROPHIL: 0.04 K/CU MM
TOTAL NUCLEATED RBC: 0 K/CU MM
WBC # BLD: 5.9 K/CU MM (ref 4–10.5)

## 2021-12-07 PROCEDURE — 2580000003 HC RX 258: Performed by: NURSE PRACTITIONER

## 2021-12-07 PROCEDURE — 2580000003 HC RX 258: Performed by: ANESTHESIOLOGY

## 2021-12-07 PROCEDURE — 85025 COMPLETE CBC W/AUTO DIFF WBC: CPT

## 2021-12-07 PROCEDURE — 6360000002 HC RX W HCPCS

## 2021-12-07 PROCEDURE — 82962 GLUCOSE BLOOD TEST: CPT

## 2021-12-07 PROCEDURE — 85018 HEMOGLOBIN: CPT

## 2021-12-07 PROCEDURE — 2709999900 HC NON-CHARGEABLE SUPPLY: Performed by: SPECIALIST

## 2021-12-07 PROCEDURE — 85014 HEMATOCRIT: CPT

## 2021-12-07 PROCEDURE — 3609017100 HC EGD: Performed by: SPECIALIST

## 2021-12-07 PROCEDURE — 3700000001 HC ADD 15 MINUTES (ANESTHESIA): Performed by: SPECIALIST

## 2021-12-07 PROCEDURE — 3609027000 HC COLONOSCOPY: Performed by: SPECIALIST

## 2021-12-07 PROCEDURE — 36415 COLL VENOUS BLD VENIPUNCTURE: CPT

## 2021-12-07 PROCEDURE — G0378 HOSPITAL OBSERVATION PER HR: HCPCS

## 2021-12-07 PROCEDURE — C9113 INJ PANTOPRAZOLE SODIUM, VIA: HCPCS | Performed by: INTERNAL MEDICINE

## 2021-12-07 PROCEDURE — 6360000002 HC RX W HCPCS: Performed by: NURSE ANESTHETIST, CERTIFIED REGISTERED

## 2021-12-07 PROCEDURE — 0DJD8ZZ INSPECTION OF LOWER INTESTINAL TRACT, VIA NATURAL OR ARTIFICIAL OPENING ENDOSCOPIC: ICD-10-PCS | Performed by: SPECIALIST

## 2021-12-07 PROCEDURE — 80048 BASIC METABOLIC PNL TOTAL CA: CPT

## 2021-12-07 PROCEDURE — 94761 N-INVAS EAR/PLS OXIMETRY MLT: CPT

## 2021-12-07 PROCEDURE — 1200000000 HC SEMI PRIVATE

## 2021-12-07 PROCEDURE — 2580000003 HC RX 258: Performed by: NURSE ANESTHETIST, CERTIFIED REGISTERED

## 2021-12-07 PROCEDURE — 6360000002 HC RX W HCPCS: Performed by: INTERNAL MEDICINE

## 2021-12-07 PROCEDURE — 3700000000 HC ANESTHESIA ATTENDED CARE: Performed by: SPECIALIST

## 2021-12-07 PROCEDURE — 0DJ08ZZ INSPECTION OF UPPER INTESTINAL TRACT, VIA NATURAL OR ARTIFICIAL OPENING ENDOSCOPIC: ICD-10-PCS | Performed by: SPECIALIST

## 2021-12-07 PROCEDURE — 6370000000 HC RX 637 (ALT 250 FOR IP): Performed by: NURSE PRACTITIONER

## 2021-12-07 RX ORDER — SODIUM CHLORIDE, SODIUM LACTATE, POTASSIUM CHLORIDE, CALCIUM CHLORIDE 600; 310; 30; 20 MG/100ML; MG/100ML; MG/100ML; MG/100ML
INJECTION, SOLUTION INTRAVENOUS ONCE
Status: COMPLETED | OUTPATIENT
Start: 2021-12-07 | End: 2021-12-07

## 2021-12-07 RX ORDER — PROPOFOL 10 MG/ML
INJECTION, EMULSION INTRAVENOUS PRN
Status: DISCONTINUED | OUTPATIENT
Start: 2021-12-07 | End: 2021-12-07

## 2021-12-07 RX ORDER — SODIUM CHLORIDE, SODIUM LACTATE, POTASSIUM CHLORIDE, CALCIUM CHLORIDE 600; 310; 30; 20 MG/100ML; MG/100ML; MG/100ML; MG/100ML
INJECTION, SOLUTION INTRAVENOUS CONTINUOUS PRN
Status: DISCONTINUED | OUTPATIENT
Start: 2021-12-07 | End: 2021-12-07 | Stop reason: SDUPTHER

## 2021-12-07 RX ORDER — LIDOCAINE HYDROCHLORIDE 20 MG/ML
INJECTION, SOLUTION INTRAVENOUS PRN
Status: DISCONTINUED | OUTPATIENT
Start: 2021-12-07 | End: 2021-12-07 | Stop reason: SDUPTHER

## 2021-12-07 RX ORDER — PROPOFOL 10 MG/ML
INJECTION, EMULSION INTRAVENOUS PRN
Status: DISCONTINUED | OUTPATIENT
Start: 2021-12-07 | End: 2021-12-07 | Stop reason: SDUPTHER

## 2021-12-07 RX ADMIN — SODIUM CHLORIDE, POTASSIUM CHLORIDE, SODIUM LACTATE AND CALCIUM CHLORIDE: 600; 310; 30; 20 INJECTION, SOLUTION INTRAVENOUS at 10:02

## 2021-12-07 RX ADMIN — TRAZODONE HYDROCHLORIDE 50 MG: 50 TABLET ORAL at 20:46

## 2021-12-07 RX ADMIN — MELATONIN TAB 3 MG 3 MG: 3 TAB at 20:46

## 2021-12-07 RX ADMIN — INSULIN LISPRO 1 UNITS: 100 INJECTION, SOLUTION INTRAVENOUS; SUBCUTANEOUS at 16:55

## 2021-12-07 RX ADMIN — SODIUM CHLORIDE, PRESERVATIVE FREE 10 ML: 5 INJECTION INTRAVENOUS at 20:48

## 2021-12-07 RX ADMIN — PHENYLEPHRINE HYDROCHLORIDE 100 MCG: 10 INJECTION INTRAVENOUS at 13:37

## 2021-12-07 RX ADMIN — SODIUM CHLORIDE, POTASSIUM CHLORIDE, SODIUM LACTATE AND CALCIUM CHLORIDE: 600; 310; 30; 20 INJECTION, SOLUTION INTRAVENOUS at 12:58

## 2021-12-07 RX ADMIN — LIDOCAINE HYDROCHLORIDE 100 MG: 20 INJECTION, SOLUTION INTRAVENOUS at 13:17

## 2021-12-07 RX ADMIN — SIMVASTATIN 40 MG: 20 TABLET, FILM COATED ORAL at 20:46

## 2021-12-07 RX ADMIN — TAMSULOSIN HYDROCHLORIDE 0.4 MG: 0.4 CAPSULE ORAL at 20:46

## 2021-12-07 RX ADMIN — GEMFIBROZIL 600 MG: 600 TABLET, FILM COATED ORAL at 16:17

## 2021-12-07 RX ADMIN — PANTOPRAZOLE SODIUM 40 MG: 40 INJECTION, POWDER, FOR SOLUTION INTRAVENOUS at 17:54

## 2021-12-07 RX ADMIN — ROPINIROLE HYDROCHLORIDE 1 MG: 1 TABLET, FILM COATED ORAL at 16:17

## 2021-12-07 RX ADMIN — ROPINIROLE HYDROCHLORIDE 1 MG: 1 TABLET, FILM COATED ORAL at 20:46

## 2021-12-07 RX ADMIN — FLUTICASONE PROPIONATE 1 SPRAY: 50 SPRAY, METERED NASAL at 09:51

## 2021-12-07 RX ADMIN — PROPOFOL 750 MG: 10 INJECTION, EMULSION INTRAVENOUS at 13:17

## 2021-12-07 ASSESSMENT — PAIN SCALES - GENERAL
PAINLEVEL_OUTOF10: 0

## 2021-12-07 ASSESSMENT — LIFESTYLE VARIABLES: SMOKING_STATUS: 1

## 2021-12-07 NOTE — PROGRESS NOTES
Hospitalist Progress Note      Name:  Slime Templeton /Age/Sex: 1945  (68 y.o. male)   MRN & CSN:  [id number] & [id number] Admission Date/Time: 2021  2:02 PM   Location:  [address] [place] [address] PCP: Apple Pruitt MD         Hospital Day: 4      Assessment and Plan:     Patient has a EGD/colonoscopy pending today. Anticipate discharge home in 1 to 2 days pending EGD/colonoscopy results and stability of hemoglobin. #. Bright red blood per rectum  Continue to monitor H&H and transfuse for hemoglobin 7  GI following for possible EGD/colonoscopy today. Continue IV Protonix  #. Iron deficiency anemia  Status post IV Venofer  Continue p.o. ferrous sulfate  #. Diabetes mellitus, not with hyperglycemia  Continue current pain regiment  #. Hyperlipidemia \"unspecified  Continue simvastatin  #. Hypothyroidism: Acquired  Continue home levothyroxine  #. Obstructive sleep apnea  Continue CPAP at night. DVT prophylaxis: CDs due to GI bleed  CODE STATUS: Total support        Subjective. :     Patient is a 51-year-old male who was admitted for GI bleed. Scheduled for EGD/colonoscopy today. Objective:   Vitals:   Vitals:    21 0809   BP: 136/74   Pulse: 76   Resp: 16   Temp: 97.7 °F (36.5 °C)   SpO2: 98%         Physical Exam:   GEN: Awake, alert, in no apparent distress awake male, sitting upright in bed in no apparent distress. Appears given age. HEENT: Atraumatic, normocephalic, PERRLA, EOMI  NECK: Supple, no apparent thyromegaly or masses. RESP: Clear lungs to auscultation  CARDIO/VASC: Regular rate and rhythm, normal S1, S2, no murmurs  GI: Abdomen is soft, nontender, no significant organomegaly noted, bowel sounds present  MSK: No gross joint deformities.   Skin: No significant rashes, skin lesions noted  Neuro: AOx3, cranial nerves grossly intact, no focal motor or sensory deficits   PSYCH: Affect appropriate    Medications:   Medications:    pantoprazole  40 mg IntraVENous BID    citalopram 10 mg Oral Daily    [Held by provider] ferrous sulfate  325 mg Oral Daily    finasteride  5 mg Oral Daily    fluticasone  1 spray Each Nostril Daily    gemfibrozil  600 mg Oral BID AC    glipiZIDE  10 mg Oral QAM AC    levothyroxine  150 mcg Oral Daily    melatonin  3 mg Oral Daily    rOPINIRole  1 mg Oral TID    simvastatin  40 mg Oral Nightly    tamsulosin  0.4 mg Oral Daily    traZODone  50 mg Oral Nightly    sodium chloride flush  5-40 mL IntraVENous 2 times per day    insulin lispro  0-6 Units SubCUTAneous TID     insulin lispro  0-3 Units SubCUTAneous Nightly    polyethylene glycol  17 g Oral Daily      Infusions:    lactated ringers      sodium chloride 250 mL (12/06/21 1200)    dextrose       PRN Meds: cetirizine, 10 mg, Daily PRN  cyclobenzaprine, 10 mg, BID PRN  traMADol, 50 mg, Q4H PRN  sodium chloride flush, 5-40 mL, PRN  sodium chloride, 25 mL, PRN  ondansetron, 4 mg, Q8H PRN   Or  ondansetron, 4 mg, Q6H PRN  polyethylene glycol, 17 g, Daily PRN  acetaminophen, 650 mg, Q6H PRN   Or  acetaminophen, 650 mg, Q6H PRN  glucose, 15 g, PRN  dextrose, 12.5 g, PRN  glucagon (rDNA), 1 mg, PRN  dextrose, 100 mL/hr, PRN          Electronically signed by Skip Valdes MD on 12/7/2021 at 10:02 AM

## 2021-12-07 NOTE — ANESTHESIA PRE PROCEDURE
Department of Anesthesiology  Preprocedure Note       Name:  John Hightower   Age:  68 y.o.  :  1945                                          MRN:  [id number]         Date:  2021      Surgeon: Dulce Williamson):  Amy Christianson MD    Procedure: Procedure(s):  EGD, colonoscopy    Medications prior to admission:   Prior to Admission medications    Medication Sig Start Date End Date Taking? Authorizing Provider   rOPINIRole (REQUIP) 1 MG tablet Take 1 mg by mouth 3 times daily 12/3/21 1/2/22 Yes Historical Provider, MD   finasteride (PROSCAR) 5 MG tablet Take 5 mg by mouth daily 21  Yes Historical Provider, MD   polyethylene glycol (GLYCOLAX) 17 GM/SCOOP powder Take 17 g by mouth daily   Yes Historical Provider, MD   ferrous sulfate (IRON 325) 325 (65 Fe) MG tablet Take 325 mg by mouth daily 12/3/21 3/3/22 Yes Historical Provider, MD   tamsulosin (FLOMAX) 0.4 MG capsule Take 0.4 mg by mouth daily 21  Yes Historical Provider, MD   cetirizine (ZYRTEC) 10 MG tablet Take by mouth   Yes Historical Provider, MD   citalopram (CELEXA) 20 MG tablet TAKE 1 TABLET BY MOUTH  DAILY WITH DINNER 20  Yes Historical Provider, MD   levothyroxine (SYNTHROID) 150 MCG tablet 1 tab a day for 6 days a week and 1.5 tab on Sundays. 18  Yes Historical Provider, MD   metFORMIN (GLUCOPHAGE) 500 MG tablet TAKE 1 TABLET BY MOUTH IN THE MORNING AND 1 WITH EVENING MEAL 3/14/20  Yes Historical Provider, MD   traMADol (ULTRAM) 50 MG tablet Take 50 mg by mouth every 4 hours as needed.    Yes Historical Provider, MD   cetirizine (ZYRTEC) 10 MG tablet Take by mouth   Yes Historical Provider, MD   traZODone (DESYREL) 50 MG tablet Take 50 mg by mouth nightly   Yes Historical Provider, MD   Magnesium Oxide 400 MG CAPS Take by mouth 10/12/16  Yes Historical Provider, MD   Multiple Vitamins-Minerals (FITNESS TABS FOR MEN AM/PM PO) Take by mouth   Yes Historical Provider, MD   Omega-3 Fatty Acids (OMEGA-3 FISH OIL) 1000 MG CAPS Take by mouth   Yes Historical Provider, MD   fluticasone (FLONASE) 50 MCG/ACT nasal spray 1 spray by Each Nare route daily   Yes Historical Provider, MD   levothyroxine (SYNTHROID) 150 MCG tablet Take 150 mcg by mouth daily   Yes Historical Provider, MD   calcium 600 MG TABS tablet Take 500 mg by mouth 3 times daily 1800 total   Yes Historical Provider, MD   melatonin 3 MG TABS tablet Take 3 mg by mouth daily   Yes Historical Provider, MD   glipiZIDE (GLUCOTROL) 10 MG tablet Take 10 mg by mouth every morning (before breakfast)    Yes Historical Provider, MD   gemfibrozil (LOPID) 600 MG tablet Take 600 mg by mouth 2 times daily (before meals). Yes Historical Provider, MD   simvastatin (ZOCOR) 40 MG tablet Take 40 mg by mouth nightly.    Yes Historical Provider, MD   leuprolide (LUPRON) 45 MG injection Inject into the muscle    Historical Provider, MD   gemfibrozil (LOPID) 600 MG tablet Take 600 mg by mouth 2 times daily 1/5/21   Historical Provider, MD   metFORMIN (GLUCOPHAGE) 500 MG tablet TAKE 1 TABLET BY MOUTH  TWICE DAILY 3/14/20   Historical Provider, MD   cyclobenzaprine (FLEXERIL) 10 MG tablet TAKE 1 TABLET BY MOUTH AT BEDTIME AS NEEDED 3/9/20   Historical Provider, MD   pseudoephedrine (SUDAFED) 30 MG tablet Take by mouth PRN    Historical Provider, MD   diphenhydrAMINE (BENADRYL) 25 MG tablet Take 25 mg by mouth every 6 hours as needed for Itching    Historical Provider, MD   aspirin 325 MG tablet Take 325 mg by mouth daily Stopped on 2/20/2020 9/11/21  Historical Provider, MD       Current medications:    Current Facility-Administered Medications   Medication Dose Route Frequency Provider Last Rate Last Admin    pantoprazole (PROTONIX) injection 40 mg  40 mg IntraVENous BID Jaxon German MD   40 mg at 12/06/21 1826    cetirizine (ZYRTEC) tablet 10 mg  10 mg Oral Daily PRN NILSON Riggins CNP        citalopram (CELEXA) tablet 10 mg  10 mg Oral Daily NILSON Riggins CNP   10 mg at 12/06/21 9479  cyclobenzaprine (FLEXERIL) tablet 10 mg  10 mg Oral BID PRN Millicent Nima APRN - CNP   10 mg at 12/04/21 2203    [Held by provider] ferrous sulfate (IRON 325) tablet 325 mg  325 mg Oral Daily Livan Rowena, APRN - CNP   325 mg at 12/04/21 1822    finasteride (PROSCAR) tablet 5 mg  5 mg Oral Daily Livan Akinoze, APRN - CNP   5 mg at 12/06/21 0908    fluticasone (FLONASE) 50 MCG/ACT nasal spray 1 spray  1 spray Each Nostril Daily Livan Guaman, APRN - CNP   1 spray at 12/07/21 0951    gemfibrozil (LOPID) tablet 600 mg  600 mg Oral BID AC Livan Akchristiano, APRN - CNP   600 mg at 12/06/21 1737    glipiZIDE (GLUCOTROL) tablet 10 mg  10 mg Oral QAM AC Livan Guaman, APRN - CNP   10 mg at 12/06/21 0917    levothyroxine (SYNTHROID) tablet 150 mcg  150 mcg Oral Daily Livan Akaeze, APRN - CNP   150 mcg at 12/06/21 0635    melatonin tablet 3 mg  3 mg Oral Daily Livan Akinoze, APRN - CNP   3 mg at 12/06/21 2223    rOPINIRole (REQUIP) tablet 1 mg  1 mg Oral TID Livanzoey Guaman, APRN - CNP   1 mg at 12/06/21 1838    simvastatin (ZOCOR) tablet 40 mg  40 mg Oral Nightly Livan Akinoze, APRN - CNP   40 mg at 12/06/21 2223    tamsulosin (FLOMAX) capsule 0.4 mg  0.4 mg Oral Daily Livan Akaeze, APRN - CNP   0.4 mg at 12/06/21 2223    traMADol (ULTRAM) tablet 50 mg  50 mg Oral Q4H PRN Livan Akaeze, APRN - CNP   50 mg at 12/06/21 2232    traZODone (DESYREL) tablet 50 mg  50 mg Oral Nightly Livan Akaeze, APRN - CNP   50 mg at 12/06/21 2223    sodium chloride flush 0.9 % injection 5-40 mL  5-40 mL IntraVENous 2 times per day Veene Bread, APRN - CNP   10 mL at 12/06/21 2233    sodium chloride flush 0.9 % injection 5-40 mL  5-40 mL IntraVENous PRN Livan Akaeze, APRN - CNP        0.9 % sodium chloride infusion  25 mL IntraVENous PRN Livan Akaeze, APRN -  mL/hr at 12/06/21 1200 250 mL at 12/06/21 1200    ondansetron (ZOFRAN-ODT) disintegrating tablet 4 mg  4 mg Oral Q8H PRN Livan Akaeze, APRN - CNP        Or    ondansetron (ZOFRAN) injection 4 mg  4 mg IntraVENous Q6H PRN Livan Akaeze, APRN - CNP        polyethylene glycol (GLYCOLAX) packet 17 g  17 g Oral Daily PRN Livan Akaeze, APRN - CNP        acetaminophen (TYLENOL) tablet 650 mg  650 mg Oral Q6H PRN Livan Akaeze, APRN - CNP        Or    acetaminophen (TYLENOL) suppository 650 mg  650 mg Rectal Q6H PRN Livan Akaeze, APRN - CNP        glucose (GLUTOSE) 40 % oral gel 15 g  15 g Oral PRN Livan Akaeze, APRN - CNP        dextrose 50 % IV solution  12.5 g IntraVENous PRN Livan Akaeze, APRN - CNP        glucagon (rDNA) injection 1 mg  1 mg IntraMUSCular PRN Livan Akaeze, APRN - CNP        dextrose 5 % solution  100 mL/hr IntraVENous PRN Livan Akaeze, APRN - CNP        insulin lispro (HUMALOG) injection vial 0-6 Units  0-6 Units SubCUTAneous TID WC Livan Akaeze, APRN - CNP   3 Units at 12/06/21 1738    insulin lispro (HUMALOG) injection vial 0-3 Units  0-3 Units SubCUTAneous Nightly Livan Akaeze, APRN - CNP   1 Units at 12/06/21 2233    polyethylene glycol (GLYCOLAX) packet 17 g  17 g Oral Daily Livan Akaeze, APRN - CNP   17 g at 12/06/21 3076       Allergies:     Allergies   Allergen Reactions    Seasonal      Dust, dander,pollen    Ciprofloxacin Hives    Pcn [Penicillins] Hives       Problem List:    Patient Active Problem List   Diagnosis Code    History of colon polyps Z86.010    Cataract H26.9    Erectile dysfunction N52.9    Type 2 diabetes mellitus with complication, without long-term current use of insulin (HCC) E11.8    Arthritis M19.90    Hyperlipidemia E78.5    Cholelithiasis K80.20    Idiopathic chronic pancreatitis (HCC) K86.1    Gallstone pancreatitis K85.10    Rupture of left quadriceps muscle S76.112A    Acute blood loss anemia D62    Rectal bleed K62.5    GI bleed K92.2    Acquired hypothyroidism E03.9    MYKEL on CPAP G47.33, Z99.89       Past Medical History:        Diagnosis Date    Acquired hypothyroidism 2021    Arthritis     BPH (benign prostatic hyperplasia)     CPAP (continuous positive airway pressure) dependence     Diabetes mellitus ([facility] [address]     dx 2012    Erectile dysfunction     Hyperlipidemia     Sleep apnea     had sleep study done =- uses cpap nightly    Thyroid cancer ([facility] [address] 2018    Total thyroidectomy     Wears dentures     upper    Wears glasses     \"just to read\"       Past Surgical History:        Procedure Laterality Date    APPENDECTOMY  2006    CARPAL TUNNEL RELEASE Right 2014    CATARACT REMOVAL Bilateral 2015    CHOLECYSTECTOMY  10/11/2018    COLONOSCOPY      COLONOSCOPY  2013    EYE SURGERY Right     cataract extraction with implant/ fritz \"eyelid lift- 2015    HERNIA REPAIR Right 's    inguinal    LEG MUSCLE SURGERY Left 2020    LEFT QUADRICEPS REPAIR performed by Jabari Soler DO at [address] N/A 10/11/2018    [facility]     Repair of left quadriceps tendon using [employer]    ROTATOR CUFF REPAIR Left        Social History:    Social History     Tobacco Use    Smoking status: Former Smoker     Types: Cigarettes     Quit date: 1996     Years since quittin.3    Smokeless tobacco: Never Used    Tobacco comment: \"use the vapor cigarette occ not on regular basis\"   Substance Use Topics    Alcohol use: No     Comment: \"use to drink in regular basis- quit age 42's                                Counseling given: Not Answered  Comment: \"use the vapor cigarette occ not on regular basis\"      Vital Signs (Current):   Vitals:    21 0410 21 0809 21 1204   BP: (!) 151/82  136/74 135/76   Pulse: 90  76 86   Resp: 16  16 16   Temp: 36.3 °C (97.3 °F)  36.5 °C (97.7 °F) 36.4 °C (97.6 °F)   TempSrc: Oral  Oral Temporal   SpO2: 100% 97% 98% 98%   Weight: Height:                                                  BP Readings from Last 3 Encounters:   12/07/21 135/76   12/04/21 (!) 164/72   09/11/21 (!) 173/83       NPO Status: Time of last liquid consumption: 2100                                                 Date of last liquid consumption: 12/06/21                        Date of last solid food consumption: 12/04/21    BMI:   Wt Readings from Last 3 Encounters:   12/04/21 190 lb (86.2 kg)   12/04/21 190 lb (86.2 kg)   09/11/21 190 lb (86.2 kg)     Body mass index is 29.76 kg/m². CBC:   Lab Results   Component Value Date    WBC 5.6 12/06/2021    RBC 3.86 12/06/2021    HGB 9.5 12/06/2021    HCT 32.1 12/06/2021    MCV 76.4 12/06/2021    RDW 23.6 12/06/2021     12/06/2021       CMP:   Lab Results   Component Value Date     12/07/2021    K 4.0 12/07/2021     12/07/2021    CO2 24 12/07/2021    BUN 7 12/07/2021    CREATININE 0.8 12/07/2021    GFRAA >60 12/07/2021    LABGLOM >60 12/07/2021    GLUCOSE 196 12/07/2021    PROT 6.5 12/03/2021    CALCIUM 9.1 12/07/2021    BILITOT 0.2 12/03/2021    ALKPHOS 65 12/03/2021    AST 47 12/03/2021    ALT 36 12/03/2021       POC Tests:   Recent Labs     12/07/21  1150   POCGLU 195*       Coags:   Lab Results   Component Value Date    PROTIME 12.7 12/03/2021    INR 1.02 12/03/2021    APTT 26.9 12/03/2021       HCG (If Applicable): No results found for: PREGTESTUR, PREGSERUM, HCG, HCGQUANT     ABGs: No results found for: PHART, PO2ART, HWG3NFA, LBA1IGG, BEART, W0FVXBEL     Type & Screen (If Applicable):  No results found for: [facility]    Anesthesia Evaluation  Patient summary reviewed and Nursing notes reviewed no history of anesthetic complications:   Airway: Mallampati: III  TM distance: <3 FB   Neck ROM: limited  Mouth opening: > = 3 FB Dental:    (+) upper dentures      Pulmonary:normal exam    (+) sleep apnea: on CPAP,  current smoker          Patient smoked on day of surgery. Cardiovascular:  Exercise tolerance: good (>4 METS),   (+) hyperlipidemia      ECG reviewed               Beta Blocker:  Not on Beta Blocker         Neuro/Psych:   Negative Neuro/Psych ROS              GI/Hepatic/Renal:   (+) PUD, liver disease:,          ROS comment: Chronic idiopathic pancreatitis . Endo/Other:    (+) DiabetesType II DM, well controlled, , hypothyroidism, blood dyscrasia: anemia, arthritis: OA., malignancy/cancer. Pt had no PAT visit        ROS comment: Thyroid cA Abdominal:             Vascular: negative vascular ROS. Other Findings:             Anesthesia Plan      MAC     ASA 3       Induction: intravenous. Anesthetic plan and risks discussed with patient. NILSON Palmer CRNA   12/7/2021        Pre Anesthesia Evaluation complete. Anesthesia plan, risks, benefits, alternatives, and personnel discussed with patient and/or legal guardian. Patient and/or legal guardian verbalized an understanding and agreed to proceed. Anesthesia plan discussed with care team members and agreed upon.   NILSON Palmer CRNA  12/7/2021

## 2021-12-07 NOTE — OP NOTE
[address], [address] [place] [address]                                OPERATIVE REPORT    PATIENT NAME: KAROLINE ACEVES                      :        1945  MED REC NO:   [id number]                          ROOM:       2759  ACCOUNT NO:   [de-identified]                           ADMIT DATE: 2021  PROVIDER:     Jorge Gonsalves MD    DATE OF PROCEDURE:  2021    EGD REPORT    CHIEF COMPLAINT:  Anemia and GI bleeding; rule out upper GI bleeding  lesion. PREMEDICATION:  Please refer to the anesthesiologist's notes. PROCEDURE:  The patient was placed in the left lateral decubitus  position and the video Olympus gastroscope was introduced in the back of  throat and was advanced into the esophagus. ESOPHAGUS:  The mucosa of the esophagus was unremarkable. There was no  evidence of hyperemia, erosion, ulcer, stricture, Ling's esophagus or  mass lesion. STOMACH:  The fundus, cardia, body, antrum, lesser curvature, greater  curvature, and the pyloric regions of the stomach were examined. The  mucosa of the stomach was slightly hyperemic, but no erosion or ulcers  were seen. The gastroscope was retroflexed and the fundus and cardia  were carefully examined and no mass lesions were seen. No blood recent  or old was seen in the lumen of the stomach. DUODENUM:  The first and second portions of the duodenum were examined  and the mucosa was unremarkable. No erosion or ulcers were seen. Again, no blood recent or old was seen in the lumen of the duodenum. POSTOPERATIVE DIAGNOSES:  1.  Mild superficial gastritis and duodenitis; otherwise normal EGD. 2.  No evidence of an upper GI bleeding lesion. RECOMMENDATIONS:  1. We will proceed with colonoscopy for further workup of the patient's  anemia. 2.  Small bowel follow-through later as an outpatient. The patient tolerated the procedure well.   There were no postop  complications. The blood loss during the procedure was nil.         Shannan Giraldo MD    D: 12/07/2021 14:35:23       T: 12/07/2021 14:38:12     [email or web address]  Job#: [id number]     Doc#: [id number]    CC:

## 2021-12-07 NOTE — PROGRESS NOTES
RECEIVED REPORT FROM DARCY WHITEHEAD PRIOR TO TRANSFER TO THE ENDO UNIT.  PT IS ALERT AND ORIENTED, TOOK ALL OF PREP AND HAS BEEN NPO APPROPRIATE AMOUNT OF TIME

## 2021-12-07 NOTE — ANESTHESIA POSTPROCEDURE EVALUATION
Department of Anesthesiology  Postprocedure Note    Patient: Alycia Khan  MRN: [id number]  YOB: 1945  Date of evaluation: 12/7/2021  Time:  2:31 PM     Procedure Summary     Date: 12/07/21 Room / Location: [address]    Anesthesia Start: 1253 Anesthesia Stop: 1431    Procedures:       COLONOSCOPY DIAGNOSTIC (N/A )      EGD ESOPHAGOGASTRODUODENOSCOPY (N/A ) Diagnosis:       Anemia, unspecified type      Gastrointestinal hemorrhage, unspecified gastrointestinal hemorrhage type      (Anemia, unspecified type [D64.9] Gastrointestinal hemorrhage, unspecified gastrointestinal hemorrhage type [K92.2])    Surgeons: Itzel Swan MD Responsible Provider: Tamela Fabian MD    Anesthesia Type: MAC ASA Status: 3          Anesthesia Type: MAC    Eden Phase I:  10    Eden Phase II:  10    Last vitals: Reviewed and per EMR flowsheets.        Anesthesia Post Evaluation    Patient location during evaluation: bedside  Patient participation: complete - patient participated  Level of consciousness: awake and alert  Pain score: 0  Airway patency: patent  Nausea & Vomiting: no nausea and no vomiting  Complications: no  Cardiovascular status: hemodynamically stable  Respiratory status: acceptable, room air, spontaneous ventilation and nonlabored ventilation  Hydration status: euvolemic
Statement Selected

## 2021-12-07 NOTE — CONSULTS
Brief Postoperative Note      Nadiya Josue is a 68 y.o. male     Pre-operative Diagnosis: ANEMIA  / GIT BLEEDING    Post-operative Diagnosis: GASTRITIS / Claryce Sensing  /   D.COLI-- HDS   RADIATION PROCTITIS    Procedure: EGD / COLONOSCOPY    Anesthesia: MAC    Surgeons/Assistants:Sara Ruff MD     Estimated Blood Loss: NONE    Complications: None    Specimens: were not obtained  REC  SBFT AS OUT PT    Nora Perla MD   12/7/2021   2:30 PM

## 2021-12-07 NOTE — OP NOTE
[address] [place] [address], [address] [place] [address]                                OPERATIVE REPORT    PATIENT NAME: KAROLINE ACEVES                      :        1945  MED REC NO:   [id number]                          ROOM:       1965  ACCOUNT NO:   [de-identified]                           ADMIT DATE: 2021  PROVIDER:     Franchesca Santiago MD    DATE OF PROCEDURE:  2021    COLONOSCOPY REPORT    PREOPERATIVE DIAGNOSES:  History of anemia, gastrointestinal bleeding;  rule out lower gastrointestinal bleeding lesion. PREMEDICATION:  Please refer to the anesthesiologist's notes. PROCEDURE:  The patient was placed in the left lateral decubitus  position and rectal examination was performed. RECTAL EXAMINATION:  Rectal examination revealed a rectal mucosa, which  was felt to be normal.  There was evidence of external hemorrhoids, but  no fissures or fistulas were seen. The video plus colonoscope was subsequently reduced into the rectum and  was advanced all the way into the cecum. The ileocecal valve and  appendiceal orifice were identified. The colon prep was excellent. The  colonoscope was extremely long and redundant, but the colon prep was  excellent. A number of diverticula were noted scattered in the colon, but no polyps  or mass lesions were seen. No blood recent or old was seen in the lumen  of the colon. Upon withdrawing the colonoscope, prominent telangiectatic blood vessels  were noted in the distal rectum from prior radiation therapy with no  active bleeding. The colonoscope was retroflexed in the rectum and the  anorectal junction was carefully examined and grade 2 internal  hemorrhoids were seen. POSTOPERATIVE DIAGNOSES:  1. Changes consistent with radiation proctitis noted. 2.  Diverticulosis coli. 3.  Mixed hemorrhoids. 4.  No evidence of active lower GI bleeding lesion. RECOMMENDATIONS:  1.   We will continue present management. 2.  Monitor the patient's H and H and transfuse on a p.r.n. basis to  keep hemoglobin above 7 gm percent. 3.  We will schedule the patient for a small-bowel follow-through as an  outpatient for further workup of the patient's anemia. 4.  Follow up in the office after discharge, and the patient will also  be followed up by his primary physician. The patient tolerated the procedure well. There were no postprocedure  complications. The blood loss during the procedure was nil.         Alcon Pineda MD    D: 12/07/2021 14:37:48       T: 12/07/2021 14:40:55     [email or web address]  Job#: [id number]     Doc#: [id number]    CC:

## 2021-12-08 VITALS
HEIGHT: 67 IN | SYSTOLIC BLOOD PRESSURE: 141 MMHG | BODY MASS INDEX: 29.45 KG/M2 | OXYGEN SATURATION: 97 % | HEART RATE: 96 BPM | TEMPERATURE: 98.2 F | RESPIRATION RATE: 16 BRPM | WEIGHT: 187.61 LBS | DIASTOLIC BLOOD PRESSURE: 73 MMHG

## 2021-12-08 LAB
ANION GAP SERPL CALCULATED.3IONS-SCNC: 12 MMOL/L (ref 4–16)
BASOPHILS ABSOLUTE: 0 K/CU MM
BASOPHILS RELATIVE PERCENT: 0.4 % (ref 0–1)
BUN BLDV-MCNC: 9 MG/DL (ref 6–23)
CALCIUM SERPL-MCNC: 8.5 MG/DL (ref 8.3–10.6)
CHLORIDE BLD-SCNC: 102 MMOL/L (ref 99–110)
CO2: 25 MMOL/L (ref 21–32)
CREAT SERPL-MCNC: 0.8 MG/DL (ref 0.9–1.3)
DIFFERENTIAL TYPE: ABNORMAL
EOSINOPHILS ABSOLUTE: 0.2 K/CU MM
EOSINOPHILS RELATIVE PERCENT: 3.8 % (ref 0–3)
GFR AFRICAN AMERICAN: >60 ML/MIN/1.73M2
GFR NON-AFRICAN AMERICAN: >60 ML/MIN/1.73M2
GLUCOSE BLD-MCNC: 143 MG/DL (ref 70–99)
GLUCOSE BLD-MCNC: 173 MG/DL (ref 70–99)
GLUCOSE BLD-MCNC: 235 MG/DL (ref 70–99)
HCT VFR BLD CALC: 29.6 % (ref 42–52)
HCT VFR BLD CALC: 31.9 % (ref 42–52)
HEMOGLOBIN: 8.6 GM/DL (ref 13.5–18)
HEMOGLOBIN: 9.4 GM/DL (ref 13.5–18)
IMMATURE NEUTROPHIL %: 0.7 % (ref 0–0.43)
LYMPHOCYTES ABSOLUTE: 0.9 K/CU MM
LYMPHOCYTES RELATIVE PERCENT: 16.9 % (ref 24–44)
MCH RBC QN AUTO: 22.3 PG (ref 27–31)
MCHC RBC AUTO-ENTMCNC: 29.1 % (ref 32–36)
MCV RBC AUTO: 76.9 FL (ref 78–100)
MONOCYTES ABSOLUTE: 0.5 K/CU MM
MONOCYTES RELATIVE PERCENT: 9.6 % (ref 0–4)
NUCLEATED RBC %: 0 %
PDW BLD-RTO: 25.1 % (ref 11.7–14.9)
PLATELET # BLD: 454 K/CU MM (ref 140–440)
PMV BLD AUTO: 8.9 FL (ref 7.5–11.1)
POTASSIUM SERPL-SCNC: 4.2 MMOL/L (ref 3.5–5.1)
RBC # BLD: 3.85 M/CU MM (ref 4.6–6.2)
SEGMENTED NEUTROPHILS ABSOLUTE COUNT: 3.8 K/CU MM
SEGMENTED NEUTROPHILS RELATIVE PERCENT: 68.6 % (ref 36–66)
SODIUM BLD-SCNC: 139 MMOL/L (ref 135–145)
TOTAL IMMATURE NEUTOROPHIL: 0.04 K/CU MM
TOTAL NUCLEATED RBC: 0 K/CU MM
WBC # BLD: 5.5 K/CU MM (ref 4–10.5)

## 2021-12-08 PROCEDURE — 2580000003 HC RX 258: Performed by: NURSE PRACTITIONER

## 2021-12-08 PROCEDURE — 6370000000 HC RX 637 (ALT 250 FOR IP): Performed by: NURSE PRACTITIONER

## 2021-12-08 PROCEDURE — 80048 BASIC METABOLIC PNL TOTAL CA: CPT

## 2021-12-08 PROCEDURE — 85014 HEMATOCRIT: CPT

## 2021-12-08 PROCEDURE — G0378 HOSPITAL OBSERVATION PER HR: HCPCS

## 2021-12-08 PROCEDURE — 85025 COMPLETE CBC W/AUTO DIFF WBC: CPT

## 2021-12-08 PROCEDURE — 36415 COLL VENOUS BLD VENIPUNCTURE: CPT

## 2021-12-08 PROCEDURE — 85018 HEMOGLOBIN: CPT

## 2021-12-08 PROCEDURE — C9113 INJ PANTOPRAZOLE SODIUM, VIA: HCPCS | Performed by: INTERNAL MEDICINE

## 2021-12-08 PROCEDURE — 6360000002 HC RX W HCPCS: Performed by: INTERNAL MEDICINE

## 2021-12-08 PROCEDURE — 82962 GLUCOSE BLOOD TEST: CPT

## 2021-12-08 RX ORDER — ASPIRIN 325 MG
325 TABLET ORAL DAILY
Qty: 30 TABLET | Refills: 0
Start: 2021-12-08 | End: 2021-12-22

## 2021-12-08 RX ADMIN — GEMFIBROZIL 600 MG: 600 TABLET, FILM COATED ORAL at 06:43

## 2021-12-08 RX ADMIN — SODIUM CHLORIDE, PRESERVATIVE FREE 10 ML: 5 INJECTION INTRAVENOUS at 09:32

## 2021-12-08 RX ADMIN — INSULIN LISPRO 1 UNITS: 100 INJECTION, SOLUTION INTRAVENOUS; SUBCUTANEOUS at 08:14

## 2021-12-08 RX ADMIN — TRAMADOL HYDROCHLORIDE 50 MG: 50 TABLET, COATED ORAL at 04:28

## 2021-12-08 RX ADMIN — FLUTICASONE PROPIONATE 1 SPRAY: 50 SPRAY, METERED NASAL at 09:28

## 2021-12-08 RX ADMIN — LEVOTHYROXINE SODIUM 150 MCG: 25 TABLET ORAL at 06:43

## 2021-12-08 RX ADMIN — PANTOPRAZOLE SODIUM 40 MG: 40 INJECTION, POWDER, FOR SOLUTION INTRAVENOUS at 04:29

## 2021-12-08 RX ADMIN — GLIPIZIDE 10 MG: 5 TABLET ORAL at 06:42

## 2021-12-08 RX ADMIN — CITALOPRAM HYDROBROMIDE 10 MG: 20 TABLET ORAL at 09:27

## 2021-12-08 RX ADMIN — FINASTERIDE 5 MG: 5 TABLET, FILM COATED ORAL at 09:27

## 2021-12-08 RX ADMIN — INSULIN LISPRO 2 UNITS: 100 INJECTION, SOLUTION INTRAVENOUS; SUBCUTANEOUS at 11:41

## 2021-12-08 RX ADMIN — ROPINIROLE HYDROCHLORIDE 1 MG: 1 TABLET, FILM COATED ORAL at 09:27

## 2021-12-08 ASSESSMENT — PAIN DESCRIPTION - PAIN TYPE: TYPE: ACUTE PAIN

## 2021-12-08 ASSESSMENT — PAIN SCALES - GENERAL
PAINLEVEL_OUTOF10: 7
PAINLEVEL_OUTOF10: 0
PAINLEVEL_OUTOF10: 7
PAINLEVEL_OUTOF10: 0

## 2021-12-08 NOTE — DISCHARGE SUMMARY
Discharge Summary    Name:  Bry Griffith /Age/Sex: 1945  (68 y.o. male)   MRN & CSN:  [id number] & [id number] Admission Date/Time: 2021  2:02 PM   Attending:  Liborio May MD Discharging Physician: Liborio May MD     Hospital Course:   Bry Griffith is a 68 y.o.  male  who presents with GI bleed    Hospital Course. Patient is a 78-year-old male past medical history of diabetes, hypothyroidism, sleep apnea, undergoing radiation for prostate who presented to ED with bright red blood per rectum. Patient was started on IV PPIs and he was seen by gastroenterology. Patient had a EGD/colonoscopy. Patient was found to have proctitis no active bleeding. Patient hemoglobin had remained stable. GI had cleared patient for discharge with recommendation to follow outpatient for a small bowel follow-through. Since patient hemoglobin has been stable instantly been cleared by gastroenterology, he will be discharged back home in stable condition. He was advised to follow-up with his family physician and gastroenterologist for transition of care. The patient expressed appropriate understanding of and agreement with the discharge recommendations, medications, and plan.      Consults this admission:  IP CONSULT TO GI    Discharge Instruction:   Follow up appointments:   Primary care physician:  within 2 weeks    Diet:  cardiac diet   Activity: activity as tolerated  Disposition: Discharged to:   [x]Home, []HHC, []SNF, []Acute Rehab, []Hospice   Condition on discharge: Stable    Discharge Medications:   @DISCHARGEMEDSLIST(<NOROUTINE> error)@    Objective Findings at Discharge:   BP (!) 141/73   Pulse 96   Temp 98.2 °F (36.8 °C) (Oral)   Resp 16   Ht 5' 7\" (1.702 m)   Wt 187 lb 9.8 oz (85.1 kg)   SpO2 97%   BMI 29.38 kg/m²            PHYSICAL EXAM   GEN Awake, Alert, in no apparent distress  HEENT Atraumatic, nomocephalic, PERRLA, EOMI  NECK Supple, no lymphadenopaty  RESP Clear lungs to auscultation bilaterally  CARDIO/VASC Normal S1/S2. RRRR. No mumurs. GI Abdomen is soft without significant tenderness, masses, or guarding. Bowel sounds +  MSK No gross joint deformities. SKIN Normal coloration, warm, dry. NEURO Cranial nerves appear grossly intact, normal speech, no lateralizing weakness. PSYCH Awake, alert, oriented x 3. Affect appropriate. BMP/CBC  Recent Labs     12/06/21  0406 12/06/21  1225 12/07/21  1141 12/07/21  1141 12/07/21  1741 12/08/21  0019 12/08/21  0639     --  141  --   --  139  --    K 4.6  --  4.0  --   --  4.2  --      --  101  --   --  102  --    CO2 26  --  24  --   --  25  --    BUN 11  --  7  --   --  9  --    CREATININE 0.8*  --  0.8*  --   --  0.8*  --    WBC 5.6  --  5.9  --   --  5.5  --    HCT 29.5*   < > 30.3*  33.0*   < > 30.6* 29.6* 31.9*     --  484*  --   --  454*  --     < > = values in this interval not displayed.        IMAGING:      Discharge Time of 35 minutes    Electronically signed by Liborio May MD on 12/8/2021 at 10:15 AM

## 2021-12-08 NOTE — PLAN OF CARE
Problem: Pain:  Goal: Pain level will decrease  Description: Pain level will decrease  12/8/2021 1248 by Jose R Cervantes RN  Outcome: Ongoing  12/8/2021 0033 by Rhona Luong RN  Outcome: Ongoing  Goal: Control of acute pain  Description: Control of acute pain  12/8/2021 1248 by Jose R Cervantes RN  Outcome: Ongoing  12/8/2021 0033 by Rhona Luong RN  Outcome: Ongoing  Goal: Control of chronic pain  Description: Control of chronic pain  12/8/2021 1248 by Jose R Cervantes RN  Outcome: Ongoing  12/8/2021 0033 by Rhona Luong RN  Outcome: Ongoing     Problem: Falls - Risk of:  Goal: Will remain free from falls  Description: Will remain free from falls  12/8/2021 1248 by Jose R Cervantes RN  Outcome: Ongoing  12/8/2021 0033 by Rhona Luong RN  Outcome: Ongoing  Goal: Absence of physical injury  Description: Absence of physical injury  12/8/2021 1248 by Jose R Cervantes RN  Outcome: Ongoing  12/8/2021 0033 by Rhona Luong RN  Outcome: Ongoing     Problem: Discharge Planning:  Goal: Participates in care planning  Description: Participates in care planning  12/8/2021 1248 by Jose R Cervantes RN  Outcome: Ongoing  12/8/2021 0033 by Rhona Luong RN  Outcome: Ongoing  Goal: Discharged to appropriate level of care  Description: Discharged to appropriate level of care  12/8/2021 1248 by Jose R Cervantes RN  Outcome: Ongoing  12/8/2021 0033 by Rhona Luong RN  Outcome: Ongoing     Problem: Skin Integrity:  Goal: Will show no infection signs and symptoms  Description: Will show no infection signs and symptoms  Outcome: Ongoing  Goal: Absence of new skin breakdown  Description: Absence of new skin breakdown  Outcome: Ongoing

## 2021-12-09 ENCOUNTER — CARE COORDINATION (OUTPATIENT)
Dept: CASE MANAGEMENT | Age: 76
End: 2021-12-09

## 2021-12-09 DIAGNOSIS — K92.2 GASTROINTESTINAL HEMORRHAGE, UNSPECIFIED GASTROINTESTINAL HEMORRHAGE TYPE: Primary | ICD-10-CM

## 2021-12-09 PROCEDURE — 1111F DSCHRG MED/CURRENT MED MERGE: CPT | Performed by: FAMILY MEDICINE

## 2021-12-09 NOTE — CARE COORDINATION
patient/family/caregiver/guardian to support self-management--  Assessment and support for treatment adherence and medication management--    Care Transitions 24 Hour Call    Schedule Follow Up Appointment with PCP: Completed  Do you have any ongoing symptoms?: No  Do you have a copy of your discharge instructions?: Yes  Do you have all of your prescriptions and are they filled?: Yes  Have you been contacted by a [address] [employer] Pharmacist?: No  Have you scheduled your follow up appointment?: Yes  How are you going to get to your appointment?: Car - family or friend to transport  Were you discharged with any Home Care or Post Acute Services: No  Care Transitions Interventions   Home Care Waiver: Declined        Transportation Support: Declined      DME Assistance: Declined   Diabetes Education: Completed      Disease Association: Completed             Follow Up : 12/15 GI Dr Juliana Granda,  PCP Dr Emeli Newsome. Transitions of Care Initial Call    Was this an external facility discharge? No Discharge Facility: [facility]    Challenges to be reviewed by the provider   Additional needs identified to be addressed with provider: No  none             Method of communication with provider : none      Advance Care Planning:   Does patient have an Advance Directive: not on file; education provided. Was this a readmission? No  Patient stated reason for admission: GI Bleeding  Patients top risk factors for readmission: medical condition-HLD, MYKEL on Cpap. Thyroid CA, DM2, Artritis, Prostate CA (recent Rad tx)     Care Transition Nurse (CTN) contacted the patient by telephone to perform post hospital discharge assessment. Verified name and  with patient as identifiers. Provided introduction to self, and explanation of the CTN role. CTN reviewed discharge instructions, medical action plan and red flags with patient who verbalized understanding.  Patient given an opportunity to ask questions and does not have any further questions or concerns at this time. Were discharge instructions available to patient? Yes. Reviewed appropriate site of care based on symptoms and resources available to patient including: PCP, Specialist, Urgent care clinics and When to call 911. The patient agrees to contact the PCP office for questions related to their healthcare. Medication reconciliation was performed with wife Sury Mcclendon who verbalizes understanding of administration of home medications. Advised obtaining a 90-day supply of all daily and as-needed medications. Covid Risk Education     Educated patient about risk for severe COVID-19 due to risk factors according to CDC guidelines. CTN reviewed discharge instructions, medical action plan and red flag symptoms with the patient & wife who verbalized understanding. Discussed COVID vaccination status:Yes. Education provided on COVID-19 vaccination as appropriate. Discussed exposure protocols and quarantine with CDC Guidelines. Patient was given an opportunity to verbalize any questions and concerns and agrees to contact CTN or health care provider for questions related to their healthcare. Reviewed and educated patient & wife on any new and changed medications related to discharge diagnosis. CTN provided contact information. Plan for follow-up call in 5-7 days based on severity of symptoms and risk factors.   Plan for next call: symptom management--  follow up appointment--  medication management--        AICHA Woods -[phone]

## 2021-12-16 ENCOUNTER — CARE COORDINATION (OUTPATIENT)
Dept: CASE MANAGEMENT | Age: 76
End: 2021-12-16

## 2021-12-16 NOTE — CARE COORDINATION
Care Transitions Outreach Attempt    Call within 2 business days of discharge: Yes   Attempted to reach patient for subsequent transitional call. VM left to return call to [phone]. Patient: Laura Davis Patient : 1945 MRN: [id number]    Last Discharge [facility]       Complaint Diagnosis Description Type Department Provider    21 Rectal Bleeding  ED to Hosp-Admission (Discharged) (ADMITTED) [address] Cris Sanz MD; [facility]. .. Noted following upcoming appointments from discharge chart review:   [address] follow up appointment(s): No future appointments.   Non-[facility] follow up appointment(s): na

## 2021-12-22 ENCOUNTER — CARE COORDINATION (OUTPATIENT)
Dept: CASE MANAGEMENT | Age: 76
End: 2021-12-22

## 2021-12-22 NOTE — CARE COORDINATION
[facility] Transitions Follow Up Call    2021    Patient: Aleja Campos  Patient : 1945   MRN: [id number]  Reason for Admission: GI Bleed  Discharge Date: 21 RARS: Readmission Risk Score: 19.7 ( )    Care Transitions Subsequent and Final Call    Schedule Follow Up Appointment with PCP: Declined  Subsequent and Final Calls  Do you have any ongoing symptoms?: No  Onset of Patient-reported symptoms: Today  Have your medications changed?: No  Do you have any questions related to your medications?: No  Do you currently have any active services?: No  Do you have any needs or concerns that I can assist you with?: No  Identified Barriers: None  Care Transitions Interventions   Home Care Waiver: Declined        Transportation Support: Declined      DME Assistance: Declined   Diabetes Education: Completed      Disease Association: Completed      Other Interventions: Follow Up  No future appointments. Spoke w/ Patient briefly for follow up call as currently out shopping. Denies further episodes of blood in stool; denies fatigue, dizziness, aob, malaise or bruising issues. Reports b&b wnl. Has stopped all nsaids including asa, does not drink alcohol. Since discharge has been seen by PCP and had VV w/ Dr Inda Soulier. Plan for follow up labs in 2 weeks. Denies resource needs including rx, hhc, dme. Advised to contact PCP  re: any health concerns for early outpt intervention. Has received Pfizer vaccine x 3-- reviewed and advised continued Covid19 preventative measures including mask covering nose/mouth when unable to social distance, hand washing, social distancing. Agreeable to ongoing CT follow up w/ plan for next call in 5-7 days.    Reji Gilliam RN

## 2022-01-04 ENCOUNTER — CARE COORDINATION (OUTPATIENT)
Dept: CASE MANAGEMENT | Age: 77
End: 2022-01-04

## 2022-01-04 NOTE — CARE COORDINATION
[facility] Transitions Follow Up Call    2022    Patient: Johanna Howard  Patient : 1945   MRN: [id number]  Reason for Admission: GIB  Discharge Date: 21 RARS: Readmission Risk Score: 19.7 ( )    Care Transitions Subsequent and Final Call    Schedule Follow Up Appointment with PCP: Declined  Subsequent and Final Calls  Do you have any ongoing symptoms?: Yes  Onset of Patient-reported symptoms: In the past 7 days  Patient-reported symptoms: Fatigue  Interventions for patient-reported symptoms: Notified PCP/Physician  Have your medications changed?: No  Do you have any questions related to your medications?: No  Do you currently have any active services?: No  Do you have any needs or concerns that I can assist you with?: No  Identified Barriers: Other  Care Transitions Interventions   Home Care Waiver: Declined        Transportation Support: Declined      DME Assistance: Declined   Diabetes Education: Completed      Disease Association: Completed      Other Interventions: Follow Up  No future appointments. Challenges to be reviewed by the provider   Additional needs identified to be addressed with provider:  Hx: GI Bleed    Patient with black tarry stools, fatigue, feeling \"off balance\". No stacy blood noted in stool. Having labs/hgb checked today. Taking iron as directed. Off nsaids, asa and no alcohol. Please further advise Patient as needed. Method of communication with provider : chart routing as high priority         Spoke w/ Patient for follow up call. Reports \"doing pretty decent\". Patient is concerned bleeding issue may have returned returned as bm \"not normal\". Reports  black tarry stools, fatigue, feeling \"off balance\". No stacy blood noted in stool. Denies abd pain, sob, dizziness. Voiding qs. Having labs/hgb checked today-- has monthly labs per PCP. Last documented hgb 9.4 on 21Taking iron as directed. Off nsaids, asa and no alcohol.  Has gagandeep seen by PCP and GI since hosp d/c.  f/u appt scheduled w/ Dr Yan Givens in 3 months. Reviewed fall safety. No rx or resource needs voiced. Advised to contact PCP 24/7 re: any health concerns for early outpt intervention. Has received Pfizer vaccine x 3-- reviewed and advised continued Covid19 preventative measures including mask covering nose/mouth when unable to social distance, hand washing, social distancing. Agreeable to ongoing CT follow up w/ plan for next call in 3-5 days. Upon next outreach: hgb??  Note routed to PCP, Dr Juan Ramon Morris, RN

## 2022-01-07 ENCOUNTER — CARE COORDINATION (OUTPATIENT)
Dept: CASE MANAGEMENT | Age: 77
End: 2022-01-07

## 2022-01-07 NOTE — CARE COORDINATION
[facility] Transitions Follow Up Call    2022    Patient: Ctahie Barnes  Patient : 1945   MRN: <[id number]>  Reason for Admission: GI Bleed  Discharge Date: 21 RARS: Readmission Risk Score: 19.7 ( )         Spoke with: Miguel Angel Lorenzo, patient    Contacted patient for care transition follow up. Mr. Samaria Oviedo states that he is stable and doing pretty good now. Reports he had his blood work done and his hgb is now 10.9. States he continues to take the iron pill daily. Reports he is still fatigue but getting around fine. Denies having any c/o abdominal pain, nausea/vomiting, shortness of breath, dizziness/lightheadedness. Reports stools are \"fairly normal\". No abnormal bleeding noted. He will continue to monitor. Discussed when to contact his provider with any new or worsening symptoms. He verbalized understanding. States that he has another follow up with his PCP on 22. He does not have any questions or concerns at this time. No further outreach. Care Transitions Follow Up Call    Needs to be reviewed by the provider   Additional needs identified to be addressed with provider: No  none             Method of communication with provider : none      Care Transition Nurse (CTN) contacted the patient by telephone to follow up after admission on 21-21. Verified name and  with patient as identifiers. Addressed changes since last contact: none  Discussed follow-up appointments. If no appointment was previously scheduled, appointment scheduling offered: Yes. Is follow up appointment scheduled within 7 days of discharge? Yes. CTN reviewed discharge instructions, medical action plan and red flags with patient and discussed any barriers to care and/or understanding of plan of care after discharge.  Discussed appropriate site of care based on symptoms and resources available to patient including: PCP and Specialist. The patient agrees to contact the PCP office for questions related to their healthcare. Patients top risk factors for readmission: medical condition-Acute blood loss anemia, GI bleed, Malignant neoplasm of prostate, DM Thyroid CA  Interventions to address risk factors: Scheduled appointment with PCP-Has appt on 1/21/22 and Education of patient/family/caregiver/guardian to support self-management-when to contact provider with any new or worsening symptoms      Non-[facility] follow up appointment(s): 1/21/22    CTN provided contact information for future needs. No further follow-up call indicated based on severity of symptoms and risk factors. Care Transitions Subsequent and Final Call    Subsequent and Final Calls  Do you have any ongoing symptoms?: Yes  Patient-reported symptoms: Fatigue  Do you currently have any active services?: No  Do you have any needs or concerns that I can assist you with?: No  Care Transitions Interventions   Home Care Waiver: Declined        Transportation Support: Declined      DME Assistance: Declined   Diabetes Education: Completed      Disease Association: Completed      Other Interventions: Follow Up  No future appointments.     Raúl Marrero RN

## 2023-03-07 NOTE — ED NOTES
Pt discharged with instructions and pt stated understanding.   Pt walked out of the [facility] [id number], RN  09/11/21 [id number] Pt is alert and oriented x4, able to communicate needs, assist of 2 with bed mobility. Pt attempted to sit up and take steps in room to chair but pain was too much at the time. Pt reported she has been taking oxy at home frequently which may be contributing to elevated pain even when getting PRN oxy here. O2 has remained in low 90s this shift as well on 0.5 LPM NC.

## 2023-07-16 ENCOUNTER — APPOINTMENT (OUTPATIENT)
Dept: CT IMAGING | Age: 78
End: 2023-07-16
Payer: MEDICARE

## 2023-07-16 ENCOUNTER — HOSPITAL ENCOUNTER (EMERGENCY)
Age: 78
Discharge: HOME | End: 2023-07-16
Attending: EMERGENCY MEDICINE
Payer: MEDICARE

## 2023-07-16 VITALS
HEIGHT: 68 IN | HEART RATE: 98 BPM | RESPIRATION RATE: 16 BRPM | OXYGEN SATURATION: 97 % | BODY MASS INDEX: 27.28 KG/M2 | DIASTOLIC BLOOD PRESSURE: 92 MMHG | SYSTOLIC BLOOD PRESSURE: 179 MMHG | TEMPERATURE: 98.3 F | WEIGHT: 180 LBS

## 2023-07-16 DIAGNOSIS — M16.12 OSTEOARTHRITIS OF LEFT HIP, UNSPECIFIED OSTEOARTHRITIS TYPE: Primary | ICD-10-CM

## 2023-07-16 PROCEDURE — 72192 CT PELVIS W/O DYE: CPT

## 2023-07-16 PROCEDURE — 6360000002 HC RX W HCPCS: Performed by: EMERGENCY MEDICINE

## 2023-07-16 PROCEDURE — 99284 EMERGENCY DEPT VISIT MOD MDM: CPT

## 2023-07-16 RX ORDER — KETOROLAC TROMETHAMINE 15 MG/ML
15 INJECTION, SOLUTION INTRAMUSCULAR; INTRAVENOUS ONCE
Status: COMPLETED | OUTPATIENT
Start: 2023-07-16 | End: 2023-07-16

## 2023-07-16 RX ORDER — NABUMETONE 500 MG/1
500 TABLET, FILM COATED ORAL 2 TIMES DAILY
Qty: 20 TABLET | Refills: 0 | Status: SHIPPED | OUTPATIENT
Start: 2023-07-16

## 2023-07-16 RX ADMIN — KETOROLAC TROMETHAMINE 15 MG: 15 INJECTION, SOLUTION INTRAMUSCULAR; INTRAVENOUS at 13:25

## 2023-07-16 ASSESSMENT — LIFESTYLE VARIABLES
HOW MANY STANDARD DRINKS CONTAINING ALCOHOL DO YOU HAVE ON A TYPICAL DAY: PATIENT DOES NOT DRINK
HOW OFTEN DO YOU HAVE A DRINK CONTAINING ALCOHOL: NEVER

## 2023-07-16 ASSESSMENT — PAIN DESCRIPTION - ORIENTATION
ORIENTATION: LEFT
ORIENTATION: LEFT

## 2023-07-16 ASSESSMENT — PAIN - FUNCTIONAL ASSESSMENT
PAIN_FUNCTIONAL_ASSESSMENT: 0-10
PAIN_FUNCTIONAL_ASSESSMENT: 0-10

## 2023-07-16 ASSESSMENT — PAIN DESCRIPTION - LOCATION
LOCATION: HIP
LOCATION: HIP

## 2023-07-16 ASSESSMENT — PAIN DESCRIPTION - DESCRIPTORS
DESCRIPTORS: ACHING;DISCOMFORT
DESCRIPTORS: SHARP

## 2023-07-16 ASSESSMENT — PAIN SCALES - GENERAL: PAINLEVEL_OUTOF10: 3

## 2023-07-16 ASSESSMENT — PAIN DESCRIPTION - FREQUENCY: FREQUENCY: INTERMITTENT

## 2023-07-16 ASSESSMENT — PAIN DESCRIPTION - PAIN TYPE
TYPE: ACUTE PAIN
TYPE: ACUTE PAIN

## 2023-07-16 ASSESSMENT — PAIN DESCRIPTION - ONSET: ONSET: ON-GOING

## 2023-09-20 NOTE — CONSULTS
Can order any volume he wants , depending on his cost.        Department of Internal Medicine  Gastroenterology Consult Note  José Manuel Armijo MD      Reason for Consult:  GI bleed    Primary Care Physician:  Tameka You MD    History Obtained From:  patient    HISTORY OF PRESENT ILLNESS:              The patient is a 68 y.o.  male who presents with melena since Wednesday. He has a history of radiation proctitis and a colonoscopy was attempted by Dr. Sandor Bruno in October according to the patient. The exam was not able to be completed according to the patient because he had a poor prep. He was told that he did have radiation proctitis and was prescribed steroid suppositories. He had bright red blood at this time. The melena is new. It became worse and he had a cbc done which showed an H/H of 5.4/19.5. He was subsequently admitted and transfused and his post transfusion H/H is 8/27. 6. He states that his stool is slowing down. He only had a small amount this morning. He denies any bright red blood this time. He has never had an ulcer before but states that he has been taking aspirin for some time.      Past Medical History:        Diagnosis Date    Arthritis     BPH (benign prostatic hyperplasia)     CPAP (continuous positive airway pressure) dependence     Diabetes mellitus ([address]     dx 2012    Erectile dysfunction     Hyperlipidemia     Sleep apnea     had sleep study done 2015=- uses cpap nightly    Thyroid cancer [address] [address] 06/2018    Total thyroidectomy     Wears dentures     upper    Wears glasses     \"just to read\"       Past Surgical History:        Procedure Laterality Date    APPENDECTOMY  2006    CARPAL TUNNEL RELEASE Right 2014    CATARACT REMOVAL Bilateral 12/01/2015    CHOLECYSTECTOMY  10/11/2018    COLONOSCOPY      COLONOSCOPY  04/26/2013    EYE SURGERY Right     cataract extraction with implant/ fritz \"eyelid lift- 2015    HERNIA REPAIR Right 1990's    inguinal    LEG MUSCLE SURGERY Left 2/27/2020    LEFT QUADRICEPS REPAIR performed by Enrico Kussmaul Alma Rosa Spears DO at [address] [address] [facility] N/A 10/11/2018    CHOLECYSTECTOMY LAPAROSCOPIC performed by Agapito Stack MD at [facility] Left 02/27/2020     Repair of left quadriceps tendon using [employer]    ROTATOR CUFF REPAIR Left 2005       Medications Prior to Admission:    Prior to Admission medications    Medication Sig Start Date End Date Taking? Authorizing Provider   rOPINIRole (REQUIP) 1 MG tablet Take 1 mg by mouth 3 times daily 12/3/21 1/2/22 Yes Historical Provider, MD   finasteride (PROSCAR) 5 MG tablet Take 5 mg by mouth daily 4/21/21  Yes Historical Provider, MD   polyethylene glycol (GLYCOLAX) 17 GM/SCOOP powder Take 17 g by mouth daily   Yes Historical Provider, MD   ferrous sulfate (IRON 325) 325 (65 Fe) MG tablet Take 325 mg by mouth daily 12/3/21 3/3/22 Yes Historical Provider, MD   tamsulosin (FLOMAX) 0.4 MG capsule Take 0.4 mg by mouth daily 4/21/21  Yes Historical Provider, MD   cetirizine (ZYRTEC) 10 MG tablet Take by mouth   Yes Historical Provider, MD   citalopram (CELEXA) 20 MG tablet TAKE 1 TABLET BY MOUTH  DAILY WITH DINNER 1/30/20  Yes Historical Provider, MD   levothyroxine (SYNTHROID) 150 MCG tablet 1 tab a day for 6 days a week and 1.5 tab on Sundays. 11/14/18  Yes Historical Provider, MD   metFORMIN (GLUCOPHAGE) 500 MG tablet TAKE 1 TABLET BY MOUTH IN THE MORNING AND 1 WITH EVENING MEAL 3/14/20  Yes Historical Provider, MD   traMADol (ULTRAM) 50 MG tablet Take 50 mg by mouth every 4 hours as needed.    Yes Historical Provider, MD   cetirizine (ZYRTEC) 10 MG tablet Take by mouth   Yes Historical Provider, MD   traZODone (DESYREL) 50 MG tablet Take 50 mg by mouth nightly   Yes Historical Provider, MD   Magnesium Oxide 400 MG CAPS Take by mouth 10/12/16  Yes Historical Provider, MD   Multiple Vitamins-Minerals (FITNESS TABS FOR MEN AM/PM PO) Take by mouth   Yes Historical Provider, MD   Omega-3 Fatty Acids (OMEGA-3 FISH OIL) 1000 MG CAPS Take by mouth   Yes Historical Provider, MD   fluticasone (FLONASE) 50 MCG/ACT nasal spray 1 spray by Each Nare route daily   Yes Historical Provider, MD   levothyroxine (SYNTHROID) 150 MCG tablet Take 150 mcg by mouth daily   Yes Historical Provider, MD   calcium 600 MG TABS tablet Take 500 mg by mouth 3 times daily 1800 total   Yes Historical Provider, MD   melatonin 3 MG TABS tablet Take 3 mg by mouth daily   Yes Historical Provider, MD   glipiZIDE (GLUCOTROL) 10 MG tablet Take 10 mg by mouth every morning (before breakfast)    Yes Historical Provider, MD   gemfibrozil (LOPID) 600 MG tablet Take 600 mg by mouth 2 times daily (before meals). Yes Historical Provider, MD   simvastatin (ZOCOR) 40 MG tablet Take 40 mg by mouth nightly. Yes Historical Provider, MD   leuprolide (LUPRON) 45 MG injection Inject into the muscle    Historical Provider, MD   gemfibrozil (LOPID) 600 MG tablet Take 600 mg by mouth 2 times daily 1/5/21   Historical Provider, MD   metFORMIN (GLUCOPHAGE) 500 MG tablet TAKE 1 TABLET BY MOUTH  TWICE DAILY 3/14/20   Historical Provider, MD   cyclobenzaprine (FLEXERIL) 10 MG tablet TAKE 1 TABLET BY MOUTH AT BEDTIME AS NEEDED 3/9/20   Historical Provider, MD   pseudoephedrine (SUDAFED) 30 MG tablet Take by mouth PRN    Historical Provider, MD   diphenhydrAMINE (BENADRYL) 25 MG tablet Take 25 mg by mouth every 6 hours as needed for Itching    Historical Provider, MD   aspirin 325 MG tablet Take 325 mg by mouth daily Stopped on 2/20/2020 9/11/21  Historical Provider, MD       Allergies: Allergies   Allergen Reactions    Seasonal      Dust, dander,pollen    Ciprofloxacin Hives    Pcn [Penicillins] Hives   .     Social History:    TOBACCO:  No  ETOH:  No    Family History:   Family History   Problem Relation Age of Onset    Heart Disease Mother     Stroke Mother     Stroke Father     High Blood Pressure Father        REVIEW OF SYSTEMS: (POSITIVES WILL BE UNDERLINED)  CONSTITUTIONAL:    Weight change,fatigue, fever, chills  EYES:  Diplopia, change in vision  EARS:  hearing loss, tinnitus, vertigo  NOSE:   epistaxis  MOUTH/THROAT:     hoarseness, sore throat. RESPIRATORY:  SOB,  cough, sputum, hemoptysis  CARDIOVASCULAR : chest pain,palpitations, dyspnea exertion, orthopnea, paroxysmal nocturnal dyspnea, pedal edema. GASTROINTESTINAL:  See HPI  GENITOURINARY:   dysuria, hematuria, . HEMATOLOGIC/LYMPHATIC:   Anemia, bleeding tendencies. MUSCULOSKELETAL:    myalgias,  joint pain  NEUROLOGICAL:   Loss of Consciousness, paresthesias, anesthesias, focal weakness  SKIN :   History of dermatitis, rashes  PSYCHIATRIC:  depression, , anxiety past psychosis  ENDOCRINE:  History of diabetes, thyroid disease  ALL/IMM : allergies, rashes    PHYSICAL EXAM:    Vitals:  /66   Pulse 85   Temp 97.4 °F (36.3 °C) (Oral)   Resp 16   Ht 5' 7\" (1.702 m)   Wt 190 lb (86.2 kg)   SpO2 100%   BMI 29.76 kg/m²   CONSTITUTIONAL: alert, cooperative, no apparent distress,   EYES:  pupils equal, round and reactive to light and sclera clear  ENT:  normocepalic, without obvious abnormality  NECK:  supple, symmetrical, trachea midline  HEMATOLOGIC/LYMPHATICS:  no cervical lymphadenopathy and no supraclavicular lymphadenopathy  LUNGS:  clear to auscultation  CARDIOVASCULAR:  regular rate and rhythm and no murmur noted  ABDOMEN:  Soft, non-tender with normal bowel sounds. No organomegaly or masses  NEUROLOGIC: no focal deficit detected  SKIN:  no lesions  EXTREMITIES: no clubbing, cyanosis, or edema    IMPRESSION:  1) Gi bleed, likely to be upper. It is possible that some contribution to the bleeding may be caused by the radiation proctitis. RECOMMENDATIONS:  1) IV protonix, monitor hct, transfuse as needed. 2) Will keep npo after night in the event that an EGD is done tomorrow, Dr. Melina Gillette needs to be notified.          Electronically signed by Alma Borden MD on 12/5/2021 at 11:01 AM

## 2024-09-21 NOTE — PROGRESS NOTES
ATTENDING PHYSICIAN'S PROGRESS NOTES    Patient:  Brina Ovalles      Unit/Bed:1109/1109-A    YOB: 1945    MRN: [id number]     Acct: [de-identified]     Admit date: 12/4/2021    Patient Seen, Chart, Consults notes, Labs, Radiology studies reviewed. SUBJECTIVE:   Day 1 of stay with bright red blood per rectum with associated anemia, transfused with improvement in his hemoglobin from 5 to 8; and most recent (in last 24 hours) has had no more episodes. He denied any abdominal pain, tenesmus, fever or chills. Patient seen by GI consult with recommendations noted. All other ROS negative except noted in HPI    Past, Family, Social History unchanged from admission. Diet:  ADULT DIET;  Regular; 3 carb choices (45 gm/meal)  Diet NPO    Medications:  Scheduled Meds:   iron sucrose  500 mg IntraVENous Once    pantoprazole  40 mg IntraVENous BID    citalopram  10 mg Oral Daily    [Held by provider] ferrous sulfate  325 mg Oral Daily    finasteride  5 mg Oral Daily    fluticasone  1 spray Each Nostril Daily    gemfibrozil  600 mg Oral BID AC    glipiZIDE  10 mg Oral QAM AC    levothyroxine  150 mcg Oral Daily    melatonin  3 mg Oral Daily    rOPINIRole  1 mg Oral TID    simvastatin  40 mg Oral Nightly    tamsulosin  0.4 mg Oral Daily    traZODone  50 mg Oral Nightly    sodium chloride flush  5-40 mL IntraVENous 2 times per day    insulin lispro  0-6 Units SubCUTAneous TID WC    insulin lispro  0-3 Units SubCUTAneous Nightly    polyethylene glycol  17 g Oral Daily     Continuous Infusions:   sodium chloride      dextrose       PRN Meds:cetirizine, cyclobenzaprine, traMADol, sodium chloride flush, sodium chloride, ondansetron **OR** ondansetron, polyethylene glycol, acetaminophen **OR** acetaminophen, glucose, dextrose, glucagon (rDNA), dextrose    OBJECTIVE:    CBC:   Recent Labs     12/03/21  1820 12/03/21  1820 12/04/21  0600 12/05/21  0513 12/05/21  1255   WBC 5.9  --  6.5 5.9 Carb control   --    HGB 5.4*   < > 8.3* 8.0* 7.9*     --  382 441*  --     < > = values in this interval not displayed. BMP:    Recent Labs     12/03/21  1820 12/03/21  1820 12/04/21  0600 12/04/21  1649 12/05/21  0513   *  --  135  --  139   K 4.5  --  4.3  --  4.5   CL 94*  --  99  --  103   CO2 26  --  25  --  27   BUN 13  --  8  --  9   CREATININE 0.8*  --  0.7*  --  0.8*   GLUCOSE 280*   < > 205* 272 220*    < > = values in this interval not displayed. Calcium:  Recent Labs     12/05/21 0513   CALCIUM 8.1*     Ionized Calcium:No results for input(s): IONCA in the last 72 hours. Magnesium:No results for input(s): MG in the last 72 hours. Phosphorus:No results for input(s): PHOS in the last 72 hours. BNP:No results for input(s): BNP in the last 72 hours. Glucose:  Recent Labs     12/04/21  1648 12/05/21  0620 12/05/21  1058   POCGLU 272* 216* 276*     HgbA1C: No results for input(s): LABA1C in the last 72 hours. INR:   Recent Labs     12/03/21  1820   INR 1.02     Hepatic:   Recent Labs     12/03/21 1820   ALKPHOS 65   ALT 36   AST 47*   PROT 6.5   BILITOT 0.2   LABALBU 3.9     Amylase and Lipase:No results for input(s): LACTA, AMYLASE in the last 72 hours. Lactic Acid: No results for input(s): LACTA in the last 72 hours. Troponin: No results for input(s): CKTOTAL, CKMB, TROPONINT in the last 72 hours. BNP: No results for input(s): BNP in the last 72 hours. Lipids: No results for input(s): CHOL, TRIG, HDL, LDLCALC in the last 72 hours. Invalid input(s): LDL  ABGs: No results found for: PH, PCO2, PO2, HCO3, O2SAT    Radiology reports as per the Radiologist  Radiology: No results found. Physical Exam:  Vitals: /66   Pulse 85   Temp 97.4 °F (36.3 °C) (Oral)   Resp 16   Ht 5' 7\" (1.702 m)   Wt 190 lb (86.2 kg)   SpO2 100%   BMI 29.76 kg/m²   24 hour intake/output:No intake or output data in the 24 hours ending 12/05/21 1425  Last 3 weights:   Wt Readings from Last 3 Encounters: 12/04/21 190 lb (86.2 kg)   12/04/21 190 lb (86.2 kg)   09/11/21 190 lb (86.2 kg)       General appearance - alert, well appearing, and in no distress  HEENT: Normocephalic, Atraumatic, Conjuctiva pink, PERRL, Oral mucosa normal, Lips, teeth and gums normal, Trachea midline, Thyroid normal and No noted lymphadenopathy  Chest - clear to auscultation, no wheezes, rales or rhonchi, symmetric air entry  Cardiovascular - normal rate, regular rhythm, normal S1, S2, no murmurs, rubs, clicks or gallops  Abdomen - soft, nontender, nondistended, no masses or organomegaly   Neurological - Alert and oriented, Normal speech, No focal findings or movement disorder noted and Motor and sensory grossly normal bilaterally  Integumentary - Skin color, texture, turgor normal. No Rashes or lesions  Musculoskeletal -Full ROM times 4 extremities, No clubbing or cyanosis and No peripheral edema      DVT prophylaxis: [] Lovenox                                 [x] SCDs                                 [] SQ Heparin                                 [] Encourage ambulation           [] Already on Anticoagulation                 ASSESSMENT / PLAN :    Principal Problem:    GI bleeding with  Acute blood loss anemia  Patient had presented with bright red blood per rectum, transfuse prior to transferring to this facility with appropriate response to the hemoglobin from 5 up to 8. Continue monitoring H&H and transfuse to maintain hemoglobin above 7. Started on p.o. Protonix, which has been switched to IV by GI consult pending EGD with patient currently n.p.o. after midnight. Iron deficiency anemia  Secondary to chronic GI blood loss, will be given Venofer 500 mg IV x1 today and the dose will be repeated tomorrow to make a total of 1 g. Will subsequently discharge patient on p.o. ferrous sulfate with ascorbic acid to aid absorption.     Active Problems:    Type 2 diabetes mellitus with complication, without long-term current use of insulin ([facility] [address]  Utilize low-dose SSI as needed to optimize blood sugar control, maintain on ADA diet. Hold Metformin. Hyperlipidemia  Continue with simvastatin as reordered      Acquired hypothyroidism  Maintain on home dose of Synthroid 150 mcg daily p.o. MYKEL on CPAP  Maintain on CPAP during daytime naps and nighttime sleep    Resolved Problems:    * No resolved hospital problems. *    Management as outlined, appreciate GI consult input. Monitor H&H and transfuse to maintain hemoglobin above 7, maintain in-house overnight with a.m. labs.     Electronically signed by Simone Lee MD on 12/5/2021 at 2:25 PM    [address] [place] [address]

## 2025-03-10 ENCOUNTER — HOSPITAL ENCOUNTER (OUTPATIENT)
Age: 80
Discharge: HOME | End: 2025-03-10
Payer: MEDICARE

## 2025-03-10 PROCEDURE — 80307 DRUG TEST PRSMV CHEM ANLYZR: CPT

## 2025-03-12 LAB
ALCOHOL URINE: NEGATIVE MG/DL
AMPHETAMINES UR QL SCN: NEGATIVE NG/ML
BARBITURATES UR QL SCN: NEGATIVE NG/ML
BENZODIAZ UR QL SCN: NEGATIVE NG/ML
CANNABINOIDS CTO UR CFM-MCNC: NEGATIVE NG/ML
COCAINE UR QL SCN: NEGATIVE NG/ML
CREAT UR-MCNC: 88.1 MG/DL (ref 20–400)
Lab: NORMAL
METHADONE UR QL SCN: NEGATIVE NG/ML
OPIATES CTO UR CFM-MCNC: NEGATIVE NG/ML
PCP UR QL SCN: NEGATIVE NG/ML
PROPOXYPH UR QL SCN: NEGATIVE NG/ML

## 2025-03-19 NOTE — ED NOTES
Refill request    Clindamycin 1% gel 60g  Apply and gently massage into affected areas twice daily.    LR: 11/25/24    Pharmacy: [facility] [id number]  Ph: [phone]    LV: 2/20/24  NV: 4/15/25    I did call pharmacy and they said there were no refills left.   [id number] [facility]  12/04/21 9898

## (undated) DEVICE — CHLORAPREP 26ML ORANGE

## (undated) DEVICE — Z DISCONTINUED (USE MFG CAT MVABO)  TUBING GAS SAMPLING STD 6.5 FT FEMALE CONN SMRT CAPNOLINE

## (undated) DEVICE — SET TBNG DISP TIP FOR AHTO

## (undated) DEVICE — TUBING, SUCTION, 9/32" X 10', STRAIGHT: Brand: MEDLINE

## (undated) DEVICE — INTENDED FOR TISSUE SEPARATION, AND OTHER PROCEDURES THAT REQUIRE A SHARP SURGICAL BLADE TO PUNCTURE OR CUT.: Brand: BARD-PARKER ® STAINLESS STEEL BLADES

## (undated) DEVICE — STERILE LATEX POWDER-FREE SURGICAL GLOVESWITH NITRILE COATING: Brand: PROTEXIS

## (undated) DEVICE — 3M™ WARMING BLANKET, UPPER BODY, 10 PER CASE, 42268: Brand: BAIR HUGGER™

## (undated) DEVICE — BRACE KNEE AD ALUM FOAM FULL UNIV HNG STRP CLSR ADJ X-ACT

## (undated) DEVICE — LINER SUCT CANSTR 1500CC SEMI RIG W/ POR HYDROPHOBIC SHUT

## (undated) DEVICE — GLOVE ORANGE PI 7 1/2   MSG9075

## (undated) DEVICE — PENCIL ES CRD L10FT HND SWCHING ROCK SWCH W/ EDGE COAT BLDE

## (undated) DEVICE — DRAPE,EXTREMITY,89X128,STERILE: Brand: MEDLINE

## (undated) DEVICE — GOWN,SIRUS,FABRNF,RAGLAN,L,ST,30/CS: Brand: MEDLINE

## (undated) DEVICE — BANDAGE COMPR M W6INXL10YD WHT BGE VELC E MTRX HK AND LOOP

## (undated) DEVICE — SHEATH ENDO L35CM SHFT DIA5MM DISP

## (undated) DEVICE — GLOVE SURG SZ 8 L12IN THK75MIL DK GRN LTX FREE

## (undated) DEVICE — CURITY NON-ADHERENT STRIPS: Brand: CURITY

## (undated) DEVICE — BANDAGE,SELF ADHRNT,COFLEX,4"X5YD,STRL: Brand: COLABEL

## (undated) DEVICE — GLOVE ORANGE PI 8   MSG9080

## (undated) DEVICE — Device

## (undated) DEVICE — PACK SURG LAP CHOLE

## (undated) DEVICE — TUBING INSUF 0.3UM FLTR W/ LUERLOCK CONN

## (undated) DEVICE — KENDALL 500 SERIES DIAPHORETIC FOAM MONITORING ELECTRODE - TEAR DROP SHAPE ( 30/PK): Brand: KENDALL

## (undated) DEVICE — SPONGE,LAP,18"X18",DLX,XR,ST,5/PK,40/PK: Brand: MEDLINE

## (undated) DEVICE — ANESTHESIA CIRCUIT ADULT-LF: Brand: MEDLINE INDUSTRIES, INC.

## (undated) DEVICE — SOLUTION IV 1000ML 0.9% SOD CHL FOR IRRIG PLAS CONT

## (undated) DEVICE — SUTURE PROL SZ 1 L30IN NONABSORBABLE BLU L36MM CT-1 1/2 CIR 8425H

## (undated) DEVICE — TUBING, SUCTION, 1/4" X 10', STRAIGHT: Brand: MEDLINE

## (undated) DEVICE — YANKAUER,FLEXIBLE HANDLE,REGLR CAPACITY: Brand: MEDLINE INDUSTRIES, INC.

## (undated) DEVICE — BANDAGE,ELASTIC,ESMARK,STERILE,6"X9',LF: Brand: MEDLINE

## (undated) DEVICE — GARMENT REPROCESS CALF FLOWTRON

## (undated) DEVICE — DRAPE,U/ SHT,SPLIT,PLAS,STERIL: Brand: MEDLINE

## (undated) DEVICE — CIRCUIT ANES AD CUST

## (undated) DEVICE — SUTURE FIBERWIRE SZ 5 L38IN NONABSORBABLE BLU L48MM 1/2 AR7211

## (undated) DEVICE — ELECTRODE ES AD CRDLSS PT RET REM POLYHESIVE

## (undated) DEVICE — NEEDLE HYPO 23GA L1.5IN TURQ POLYPR HUB S STL THN WALL IM

## (undated) DEVICE — SUTURE VCRL SZ 2-0 L18IN ABSRB UD CT-1 L36MM 1/2 CIR J839D

## (undated) DEVICE — TROCAR ENDOSCP SHFT L100MM DIA5MM DIL TIP ENDOPATH XCEL

## (undated) DEVICE — CONVERTORS STOCKINETTE: Brand: CONVERTORS

## (undated) DEVICE — SOLUTION IV IRRIG POUR BRL 0.9% SODIUM CHL 2F7124

## (undated) DEVICE — SUTURE VCRL SZ 1 L27IN ABSRB UD L36MM CT-1 1/2 CIR JJ40G

## (undated) DEVICE — APPLIER CLP M L L11.4IN DIA10MM ENDOSCP ROT MULT FOR LIG

## (undated) DEVICE — CRUTCH WLK AD 300LB STD AX ALUM PUSH BTTN GRDIAN

## (undated) DEVICE — ENDOSCOPY KIT: Brand: MEDLINE INDUSTRIES, INC.

## (undated) DEVICE — SOLUTION PREP POVIDONE IOD FOR SKIN MUCOUS MEM PRIOR TO

## (undated) DEVICE — GLOVE SURG SZ 65 THK91MIL LTX FREE SYN POLYISOPRENE

## (undated) DEVICE — TISSUE RETRIEVAL SYSTEM: Brand: INZII RETRIEVAL SYSTEM

## (undated) DEVICE — TOWEL,OR,DSP,ST,BLUE,STD,6/PK,12PK/CS: Brand: MEDLINE

## (undated) DEVICE — Z DISCONTINUED NO SUB IDED TUBING ETCO2 AD L6.5FT NSL ORAL CVD PRNG NONFLARED TIP OVR

## (undated) DEVICE — VITAL SIGNS™ ORAL AIRWAY BERMAN: Brand: VITAL SIGNS™

## (undated) DEVICE — PENCIL,CAUTERY,ROCKER,PTFE,15'CORD: Brand: MEDLINE INDUSTRIES, INC.

## (undated) DEVICE — STAPLER SKIN CLSR S STL NONABSORBABLE WIDE FIX HD HND GRP

## (undated) DEVICE — MARKER SURG SKIN UTIL REGULAR/FINE 2 TIP W/ RUL AND 9 LBL

## (undated) DEVICE — COTTON UNDERCAST PADDING,REGULAR FINISH: Brand: WEBRIL

## (undated) DEVICE — HEWSON SUTURE RETRIEVER: Brand: HEWSON SUTURE RETRIEVER

## (undated) DEVICE — LINER,SEMI-RIGID,3000CC,50EA/CS: Brand: MEDLINE

## (undated) DEVICE — PACK,BASIC,IX: Brand: MEDLINE

## (undated) DEVICE — YANKAUER,BULB TIP,W/O VENT,RIGID,STERILE: Brand: MEDLINE

## (undated) DEVICE — TROCAR ENDOSCP L100MM DIA11MM DIL TIP STBL SL DISP ENDOPATH

## (undated) DEVICE — SOLUTION IV IRRIG WATER 1000ML POUR BRL 2F7114